# Patient Record
Sex: MALE | Race: WHITE | NOT HISPANIC OR LATINO | Employment: FULL TIME | URBAN - METROPOLITAN AREA
[De-identification: names, ages, dates, MRNs, and addresses within clinical notes are randomized per-mention and may not be internally consistent; named-entity substitution may affect disease eponyms.]

---

## 2017-01-27 ENCOUNTER — VIRTUAL VISIT (OUTPATIENT)
Dept: FAMILY MEDICINE | Facility: CLINIC | Age: 47
End: 2017-01-27
Payer: COMMERCIAL

## 2017-01-27 DIAGNOSIS — F41.1 GAD (GENERALIZED ANXIETY DISORDER): Primary | ICD-10-CM

## 2017-01-27 PROCEDURE — 99441 ZZC PHYSICIAN TELEPHONE EVALUATION 5-10 MIN: CPT | Performed by: FAMILY MEDICINE

## 2017-01-27 RX ORDER — ESCITALOPRAM OXALATE 10 MG/1
10 TABLET ORAL DAILY
Qty: 90 TABLET | Refills: 1 | Status: SHIPPED | OUTPATIENT
Start: 2017-01-27 | End: 2024-08-02

## 2017-01-27 NOTE — PROGRESS NOTES
Over the last two weeks, how often have you been bothered by any the following symptoms?  Please use the following scale;  0 = Not at all  1 = Several days but less than half  2 = More than half of the days  3 = Nearly every day    1) Little interest or pleasure in doing things [ 0]  2) Feeling down, depressed, or hopeless.[   1 ]  3) Trouble falling or staying asleep, or sleeping too much [   0 ]  4) Feeling tired or having little energy.[   0]  5) Poor appetite or overeating [   2 ]  6) Feeling bad about yourself - or that you are a failure or have let yourself or your family down [   1 ]  7) Trouble concentrating on things, such as reading the newspaper or watching television [   0]  8) Moving or speaking so slowly that other people could have noticed. Or the opposite-being fidgety or restless that you have been moving around a lot more than usual [  0  ]  9) Thoughts that you would be better off dead, or of hurting yourself in some way [  0 ]    Finally, how difficult have these problems made it for you to do your work, take care of things at home, or get along with other people?  Somewhat difficult

## 2017-01-27 NOTE — Clinical Note
[address] [address]  Phone: [phone]    January 27, 2017        Anatoliy Boykin  [address] [place] [id number]          To whom it may concern:    RE: Anatoliy Boykin    Patient is seen and treated at our clinic. As a result of his medical diagnosis he would benefit from a level of privacy in his work environment. Doing so will make him function more efficiently in a work environment. Factors such as having personal workspace without visual or auditory interruption by others when he needs to do independent work will be helpful.        Sincerely,        Kareem Coleman MD

## 2017-01-27 NOTE — PROGRESS NOTES
"Anatoliy Boykin is a 46 year old male who is being evaluated via a telephone visit.      The patient has been notified of following:     \"This telephone visit will be conducted via a call between you and your physician/provider. We have found that certain health care needs can be provided without the need for a physical exam.  This service lets us provide the care you need with a short phone conversation.  If a prescription is necessary we can send it directly to your pharmacy.  If lab work is needed we can place an order for that and you can then stop by our lab to have the test done at a later time.    We will bill your insurance company for this service.  Please check with your medical insurance if this type of visit is covered. You may be responsible for the cost of this type of visit if insurance coverage is denied.  The typical cost is $30 (10min), $59 (11-20min) and $85 (21-30min).  Most often these visits are shorter than 10 minutes.    If during the course of the call the physician/provider feels a telephone visit is not appropriate, you will not be charged for this service.\"       Consent has been obtained for this service by 2 care team members: yes. See the scanned image in the medical record.    Anatoliy Boykin complains of  No chief complaint on file.      I have reviewed and updated the patient's Past Medical History, Social History, Family History and Medication List.    ALLERGIES  Penicillins    VIVIANE Rendon   (MA signature)    Additional provider notes: He does find this new symptom of decreased appetite. He is not skipping meals, but difficult for him to eat breakfast. No headaches or shakiness between meals. Still not interested in therapy at this time.    He does find that he is able to function better in social situations. More manageable and symptoms are less severe.    ASSESSMENT    ICD-10-CM    1. LOWELL (generalized anxiety disorder) F41.1       Plan:  Letter provided and given to MA for " work environment.    I have reviewed the note as documented above.  This accurately captures the substance of my conversation with the patient,  Anatoliy Boykin     Plan to follow-up in 6 months.    Hep A/B #3 on or after April 17, 2017.    Total time of call between patient and provider was 10 minutes

## 2017-01-28 ASSESSMENT — PATIENT HEALTH QUESTIONNAIRE - PHQ9: SUM OF ALL RESPONSES TO PHQ QUESTIONS 1-9: 4

## 2017-04-18 ENCOUNTER — ALLIED HEALTH/NURSE VISIT (OUTPATIENT)
Dept: NURSING | Facility: CLINIC | Age: 47
End: 2017-04-18
Payer: COMMERCIAL

## 2017-04-18 DIAGNOSIS — Z23 NEED FOR HEPATITIS A AND B VACCINATION: Primary | ICD-10-CM

## 2017-04-18 PROCEDURE — 90636 HEP A/HEP B VACC ADULT IM: CPT

## 2017-04-18 PROCEDURE — 99207 ZZC NO CHARGE NURSE ONLY: CPT

## 2017-04-18 PROCEDURE — 90471 IMMUNIZATION ADMIN: CPT

## 2017-04-18 NOTE — MR AVS SNAPSHOT
After Visit Summary   4/18/2017    Anatoliy Boykin    MRN: [id number]           Patient Information     Date Of Birth          1970        Visit Information        Provider Department      4/18/2017 3:00 PM [facility]IST[facility]        Today's Diagnoses     Need for hepatitis A and B vaccination    -  1       Follow-ups after your visit        Who to contact     If you have questions or need follow up information about today's clinic visit or your schedule please contact [facility] directly at [phone].  Normal or non-critical lab and imaging results will be communicated to you by BRAINREPUBLIChart, letter or phone within 4 business days after the clinic has received the results. If you do not hear from us within 7 days, please contact the clinic through Bplatst or phone. If you have a critical or abnormal lab result, we will notify you by phone as soon as possible.  Submit refill requests through UpTo or call your pharmacy and they will forward the refill request to us. Please allow 3 business days for your refill to be completed.          Additional Information About Your Visit        MyChart Information     UpTo gives you secure access to your electronic health record. If you see a primary care provider, you can also send messages to your care team and make appointments. If you have questions, please call your primary care clinic.  If you do not have a primary care provider, please call [phone] and they will assist you.        Care EveryWhere ID     This is your Care EveryWhere ID. This could be used by other organizations to access your [place] medical records  [id number]         Blood Pressure from Last 3 Encounters:   11/17/16 118/78   10/06/16 120/76   12/06/12 110/70    Weight from Last 3 Encounters:   11/17/16 200 lb 4 oz (90.8 kg)   10/06/16 196 lb (88.9 kg)   12/06/12 212 lb (96.2 kg)              We Performed the Following      ADMIN 1st VACCINE     HEPA/HEPB VACCINE ADULT IM        Primary Care Provider Office Phone # Fax #    Kareem De Jesus Tim Coleman [phone]       [place] [address]  [facility] [address]        Thank you!     Thank you for choosing [facility]  for your care. Our goal is always to provide you with excellent care. Hearing back from our patients is one way we can continue to improve our services. Please take a few minutes to complete the written survey that you may receive in the mail after your visit with us. Thank you!             Your Updated Medication List - Protect others around you: Learn how to safely use, store and throw away your medicines at www.disposemymeds.org.          This list is accurate as of: 4/18/17  3:45 PM.  Always use your most recent med list.                   Brand Name Dispense Instructions for use    escitalopram 10 MG tablet    LEXAPRO    90 tablet    Take 1 tablet (10 mg) by mouth daily

## 2017-04-18 NOTE — PROGRESS NOTES
Screening Questionnaire for Adult Immunization    Are you sick today?   No   Do you have allergies to medications, food, a vaccine component or latex?   Yes   Have you ever had a serious reaction after receiving a vaccination?   No   Do you have a long-term health problem with heart disease, lung disease, asthma, kidney disease, metabolic disease (e.g. diabetes), anemia, or other blood disorder?   No   Do you have cancer, leukemia, HIV/AIDS, or any other immune system problem?   No   In the past 3 months, have you taken medications that affect  your immune system, such as prednisone, other steroids, or anticancer drugs; drugs for the treatment of rheumatoid arthritis, Crohn s disease, or psoriasis; or have you had radiation treatments?   No   Have you had a seizure, or a brain or other nervous system problem?   No   During the past year, have you received a transfusion of blood or blood     products, or been given immune (gamma) globulin or antiviral drug?   No   For women: Are you pregnant or is there a chance you could become        pregnant during the next month?   No   Have you received any vaccinations in the past 4 weeks?   No     Immunization questionnaire was positive for at least one answer.  Notified Alexandrea.      MNVFC doesn't apply on this patient    Per orders of Dr. Lechuga, injection of Twinrix given by Nelsy Barron. Patient instructed to remain in clinic for 20 minutes afterwards, and to report any adverse reaction to me immediately.  VIS given     Screening performed by Nelsy Barron on 4/18/2017 at 3:34 PM.

## 2017-08-28 ENCOUNTER — OFFICE VISIT (OUTPATIENT)
Dept: FAMILY MEDICINE | Facility: CLINIC | Age: 47
End: 2017-08-28
Payer: COMMERCIAL

## 2017-08-28 VITALS
OXYGEN SATURATION: 97 % | RESPIRATION RATE: 16 BRPM | SYSTOLIC BLOOD PRESSURE: 117 MMHG | DIASTOLIC BLOOD PRESSURE: 77 MMHG | TEMPERATURE: 97.6 F | WEIGHT: 197 LBS | HEART RATE: 68 BPM | BODY MASS INDEX: 26.72 KG/M2

## 2017-08-28 DIAGNOSIS — D22.39 MELANOCYTIC NEVUS OF FACE, OTHER LOCATION: ICD-10-CM

## 2017-08-28 DIAGNOSIS — F41.1 GENERALIZED ANXIETY DISORDER: Primary | ICD-10-CM

## 2017-08-28 PROCEDURE — 99214 OFFICE O/P EST MOD 30 MIN: CPT | Performed by: FAMILY MEDICINE

## 2017-08-28 RX ORDER — ESCITALOPRAM OXALATE 20 MG/1
20 TABLET ORAL DAILY
Qty: 30 TABLET | Refills: 1 | Status: SHIPPED | OUTPATIENT
Start: 2017-08-28 | End: 2017-10-30

## 2017-08-28 ASSESSMENT — PATIENT HEALTH QUESTIONNAIRE - PHQ9: 5. POOR APPETITE OR OVEREATING: SEVERAL DAYS

## 2017-08-28 ASSESSMENT — ANXIETY QUESTIONNAIRES
7. FEELING AFRAID AS IF SOMETHING AWFUL MIGHT HAPPEN: NOT AT ALL
1. FEELING NERVOUS, ANXIOUS, OR ON EDGE: SEVERAL DAYS
2. NOT BEING ABLE TO STOP OR CONTROL WORRYING: NOT AT ALL
5. BEING SO RESTLESS THAT IT IS HARD TO SIT STILL: NOT AT ALL
6. BECOMING EASILY ANNOYED OR IRRITABLE: NOT AT ALL
GAD7 TOTAL SCORE: 3
IF YOU CHECKED OFF ANY PROBLEMS ON THIS QUESTIONNAIRE, HOW DIFFICULT HAVE THESE PROBLEMS MADE IT FOR YOU TO DO YOUR WORK, TAKE CARE OF THINGS AT HOME, OR GET ALONG WITH OTHER PEOPLE: NOT DIFFICULT AT ALL
3. WORRYING TOO MUCH ABOUT DIFFERENT THINGS: SEVERAL DAYS

## 2017-08-28 NOTE — MR AVS SNAPSHOT
After Visit Summary   8/28/2017    Anatoliy Boykin    MRN: [id number]           Patient Information     Date Of Birth          1970        Visit Information        Provider Department      8/28/2017 2:00 PM Kareem Lakhani MD [facility]        Today's Diagnoses     Generalized anxiety disorder    -  1    Melanocytic nevus of face, other location          Care Instructions    Plan is to increase lexapro to 20mg daily  Follow-up with me in about 1 month  Consider scheduling with the therapist    Consider consult with dermatology regarding question of enlarging mole on your nose.          Follow-ups after your visit        Additional Services     DERMATOLOGY REFERRAL       Your provider has referred you to: [facility]: Dermatology Clinic [facility] [phone]   [email or web address]    Please be aware that coverage of these services is subject to the terms and limitations of your health insurance plan.  Call member services at your health plan with any benefit or coverage questions.      Please bring the following with you to your appointment:    (1) Any X-Rays, CTs or MRIs which have been performed.  Contact the facility where they were done to arrange for  prior to your scheduled appointment.    (2) List of current medications  (3) This referral request   (4) Any documents/labs given to you for this referral            MENTAL HEALTH REFERRAL       Your provider has referred you to: [facility] Intake Scheduling [phone]  [email or web address]    All scheduling is subject to the client's specific insurance plan & benefits, provider/location availability, and provider clinical specialities.  Please arrive 15 minutes early for your first appointment and bring your completed paperwork.    Please be aware that coverage of these services is subject to the terms and limitations of your health  insurance plan.  Call member services at your health plan with any benefit or coverage questions.                  Who to contact     If you have questions or need follow up information about today's clinic visit or your schedule please contact [facility] directly at [phone].  Normal or non-critical lab and imaging results will be communicated to you by MyChart, letter or phone within 4 business days after the clinic has received the results. If you do not hear from us within 7 days, please contact the clinic through Area 1 Securityhart or phone. If you have a critical or abnormal lab result, we will notify you by phone as soon as possible.  Submit refill requests through LiveNinja or call your pharmacy and they will forward the refill request to us. Please allow 3 business days for your refill to be completed.          Additional Information About Your Visit        Area 1 Securityhart Information     LiveNinja gives you secure access to your electronic health record. If you see a primary care provider, you can also send messages to your care team and make appointments. If you have questions, please call your primary care clinic.  If you do not have a primary care provider, please call [phone] and they will assist you.        Care EveryWhere ID     This is your Care EveryWhere ID. This could be used by other organizations to access your [place] medical records  [id number]        Your Vitals Were     Pulse Temperature Respirations Pulse Oximetry BMI (Body Mass Index)       68 97.6  F (36.4  C) (Oral) 16 97% 26.72 kg/m2        Blood Pressure from Last 3 Encounters:   08/28/17 117/77   11/17/16 118/78   10/06/16 120/76    Weight from Last 3 Encounters:   08/28/17 197 lb (89.4 kg)   11/17/16 200 lb 4 oz (90.8 kg)   10/06/16 196 lb (88.9 kg)              We Performed the Following     DERMATOLOGY REFERRAL     MENTAL HEALTH REFERRAL          Today's Medication Changes          These changes are accurate as of:  8/28/17  3:14 PM.  If you have any questions, ask your nurse or doctor.               These medicines have changed or have updated prescriptions.        Dose/Directions    * escitalopram 10 MG tablet   Commonly known as:  LEXAPRO   This may have changed:  Another medication with the same name was added. Make sure you understand how and when to take each.   Used for:  LOWELL (generalized anxiety disorder)   Changed by:  Kareem Lakhani MD        Dose:  10 mg   Take 1 tablet (10 mg) by mouth daily   Quantity:  90 tablet   Refills:  1       * escitalopram 20 MG tablet   Commonly known as:  LEXAPRO   This may have changed:  You were already taking a medication with the same name, and this prescription was added. Make sure you understand how and when to take each.   Used for:  Generalized anxiety disorder   Changed by:  Kareem Lakhani MD        Dose:  20 mg   Take 1 tablet (20 mg) by mouth daily   Quantity:  30 tablet   Refills:  1       * Notice:  This list has 2 medication(s) that are the same as other medications prescribed for you. Read the directions carefully, and ask your doctor or other care provider to review them with you.         Where to get your medicines      These medications were sent to [facility] - [place], [place] - [address]  [address], [place] [address]     Phone:  [phone]     escitalopram 20 MG tablet                Primary Care Provider Office Phone # Fax #    Kareem Coleman [phone]       [address]  [facility] [address]        Equal Access to Services     [facility]: Hadii alayna ku hadasho Soomaali, waaxda luqadaha, qaybta kaalmada adeegyada, meng louise . So [facility] [phone].    ATENCIÓN: Si habla español, tiene a ibanez disposición servicios gratuitos de asistencia lingüística. Llame al [phone].    We comply with applicable federal civil rights laws and [place]  laws. We do not discriminate on the basis of race, color, national origin, age, disability sex, sexual orientation or gender identity.            Thank you!     Thank you for choosing [facility]  for your care. Our goal is always to provide you with excellent care. Hearing back from our patients is one way we can continue to improve our services. Please take a few minutes to complete the written survey that you may receive in the mail after your visit with us. Thank you!             Your Updated Medication List - Protect others around you: Learn how to safely use, store and throw away your medicines at www.disposemymeds.org.          This list is accurate as of: 8/28/17  3:14 PM.  Always use your most recent med list.                   Brand Name Dispense Instructions for use Diagnosis    * escitalopram 10 MG tablet    LEXAPRO    90 tablet    Take 1 tablet (10 mg) by mouth daily    LOWELL (generalized anxiety disorder)       * escitalopram 20 MG tablet    LEXAPRO    30 tablet    Take 1 tablet (20 mg) by mouth daily    Generalized anxiety disorder       * Notice:  This list has 2 medication(s) that are the same as other medications prescribed for you. Read the directions carefully, and ask your doctor or other care provider to review them with you.

## 2017-08-28 NOTE — PATIENT INSTRUCTIONS
Plan is to increase lexapro to 20mg daily  Follow-up with me in about 1 month  Consider scheduling with the therapist    Consider consult with dermatology regarding question of enlarging mole on your nose.

## 2017-08-28 NOTE — PROGRESS NOTES
SUBJECTIVE:   Anatoliy Boykin is a 47 year old male who presents to clinic today for the following health issues:      Anxiety Follow-Up    Status since last visit: Improved slightly     Other associated symptoms: Decrease appetite     Complicating factors:   Significant life event: No   Current substance abuse: None  Depression symptoms: Yes-  But seems that it has been improved since last appointment.  LOWELL-7 SCORE 11/17/2016   Total Score 9       GAD7          Amount of exercise or physical activity: 4-5 days/week for an average of 30-45 minutes    Problems taking medications regularly: No    Medication side effects: loss of appetite     Diet: regular (no restrictions)    Last 8 months has been pretty good.    Situations with anxiety used to be intolerable and have become close to tolerable. They haven't been pleasant, but he hasn't had to leave meetings or restaurants. He feels this is a big improvement. Feels he is on an upward trajectory. It is not a dramatic change but a significant one.    His office relocated a couple of months ago. The physical environment is much more to his taste which helps his office related anxiety.    Has had therapy in past, has done this in the past but it has been years.    Wonders about getting mole on face removed or biopsied as it is getting bigger.    EXAM:  /77 (BP Location: Right arm, Patient Position: Chair, Cuff Size: Adult Regular)  Pulse 68  Temp 97.6  F (36.4  C) (Oral)  Resp 16  Wt 197 lb (89.4 kg)  SpO2 97%  BMI 26.72 kg/m2  Constitutional: Healthy, alert, no distress   Skin: 3mm mole at left ankle of nose, no pigmentation no ulceration.  Cardiovascular: RRR. No murmurs   Respiratory: Clear to auscultation   Psych: mood good, affect appropriate, insight and judgment good, function good.    ASSESSMENT    ICD-10-CM    1. Generalized anxiety disorder F41.1 escitalopram (LEXAPRO) 20 MG tablet     MENTAL HEALTH REFERRAL   2. Melanocytic nevus of face,  other location D22.39 DERMATOLOGY REFERRAL      Plan:  Patient Instructions   Plan is to increase lexapro to 20mg daily  Follow-up with me in about 1 month  Consider scheduling with the therapist    Consider consult with dermatology regarding question of enlarging mole on your nose.     Kareem Coleman MD  Family Medicine Physician

## 2017-08-28 NOTE — NURSING NOTE
Chief Complaint   Patient presents with     Recheck Medication     escatalopram        Initial /77 (BP Location: Right arm, Patient Position: Chair, Cuff Size: Adult Regular)  Pulse 68  Temp 97.6  F (36.4  C) (Oral)  Resp 16  Wt 197 lb (89.4 kg)  SpO2 97%  BMI 26.72 kg/m2 Estimated body mass index is 26.72 kg/(m^2) as calculated from the following:    Height as of 11/17/16: 6' (1.829 m).    Weight as of this encounter: 197 lb (89.4 kg).  Medication Reconciliation: complete     Migue Kessler MA

## 2017-08-29 ASSESSMENT — ANXIETY QUESTIONNAIRES: GAD7 TOTAL SCORE: 3

## 2017-10-30 ENCOUNTER — OFFICE VISIT (OUTPATIENT)
Dept: FAMILY MEDICINE | Facility: CLINIC | Age: 47
End: 2017-10-30
Payer: COMMERCIAL

## 2017-10-30 VITALS
OXYGEN SATURATION: 97 % | WEIGHT: 191 LBS | BODY MASS INDEX: 25.87 KG/M2 | SYSTOLIC BLOOD PRESSURE: 119 MMHG | DIASTOLIC BLOOD PRESSURE: 76 MMHG | HEART RATE: 63 BPM | RESPIRATION RATE: 18 BRPM | TEMPERATURE: 97.4 F | HEIGHT: 72 IN

## 2017-10-30 DIAGNOSIS — Z23 NEED FOR PROPHYLACTIC VACCINATION AND INOCULATION AGAINST INFLUENZA: ICD-10-CM

## 2017-10-30 DIAGNOSIS — Z12.11 SCREENING FOR COLON CANCER: ICD-10-CM

## 2017-10-30 DIAGNOSIS — F41.1 GENERALIZED ANXIETY DISORDER: Primary | ICD-10-CM

## 2017-10-30 PROCEDURE — 90471 IMMUNIZATION ADMIN: CPT | Performed by: FAMILY MEDICINE

## 2017-10-30 PROCEDURE — 99213 OFFICE O/P EST LOW 20 MIN: CPT | Mod: 25 | Performed by: FAMILY MEDICINE

## 2017-10-30 PROCEDURE — 90686 IIV4 VACC NO PRSV 0.5 ML IM: CPT | Performed by: FAMILY MEDICINE

## 2017-10-30 RX ORDER — ESCITALOPRAM OXALATE 20 MG/1
20 TABLET ORAL DAILY
Qty: 90 TABLET | Refills: 3 | Status: SHIPPED | OUTPATIENT
Start: 2017-10-30 | End: 2024-08-02

## 2017-10-30 ASSESSMENT — PATIENT HEALTH QUESTIONNAIRE - PHQ9
5. POOR APPETITE OR OVEREATING: SEVERAL DAYS
SUM OF ALL RESPONSES TO PHQ QUESTIONS 1-9: 7

## 2017-10-30 ASSESSMENT — ANXIETY QUESTIONNAIRES
6. BECOMING EASILY ANNOYED OR IRRITABLE: NOT AT ALL
5. BEING SO RESTLESS THAT IT IS HARD TO SIT STILL: NOT AT ALL
1. FEELING NERVOUS, ANXIOUS, OR ON EDGE: SEVERAL DAYS
GAD7 TOTAL SCORE: 3
7. FEELING AFRAID AS IF SOMETHING AWFUL MIGHT HAPPEN: NOT AT ALL
3. WORRYING TOO MUCH ABOUT DIFFERENT THINGS: SEVERAL DAYS
2. NOT BEING ABLE TO STOP OR CONTROL WORRYING: NOT AT ALL

## 2017-10-30 NOTE — MR AVS SNAPSHOT
After Visit Summary   10/30/2017    Anatoliy Boykin    MRN: [id number]           Patient Information     Date Of Birth          1970        Visit Information        Provider Department      10/30/2017 3:00 PM Kareem Lakhani MD [facility]        Today's Diagnoses     Screening for colon cancer    -  1    Generalized anxiety disorder          Care Instructions    I recommend changing timing of lexapro to breakfast/AM which should help your sleep pattern.    Because the mole on your nose appears to be changing and has grown compared to the last note - I recommend an evaluation with dermatology.    Follow-up in 3-6 months to review response to therapy along with medication and consider if we should adjust medication at that point aiming for even better response. Sooner if sleep is still an issue after changing the timing of medication.          Follow-ups after your visit        Additional Services     GASTROENTEROLOGY ADULT REF PROCEDURE ONLY       Last Lab Result: No results found for: CR  Body mass index is 25.9 kg/(m^2).     Needed:  No  Language:  English    Patient will be contacted to schedule procedure.     Please be aware that coverage of these services is subject to the terms and limitations of your health insurance plan.  Call member services at your health plan with any benefit or coverage questions.  Any procedures must be performed at a [facility] facility OR coordinated by your clinic's referral office.    Please bring the following with you to your appointment:    (1) Any X-Rays, CTs or MRIs which have been performed.  Contact the facility where they were done to arrange for  prior to your scheduled appointment.    (2) List of current medications   (3) This referral request   (4) Any documents/labs given to you for this referral                  Your next 10 appointments already scheduled     Nov 08, 2017 12:00 PM CST   New Visit  with Vanda Westfall [facility]    [facility]  [address]  [facility] [address] [phone]              Who to contact     If you have questions or need follow up information about today's clinic visit or your schedule please contact [facility] directly at [phone].  Normal or non-critical lab and imaging results will be communicated to you by [employer], letter or phone within 4 business days after the clinic has received the results. If you do not hear from us within 7 days, please contact the clinic through [employer] or phone. If you have a critical or abnormal lab result, we will notify you by phone as soon as possible.  Submit refill requests through [employer] or call your pharmacy and they will forward the refill request to us. Please allow 3 business days for your refill to be completed.          Additional Information About Your Visit        Backblaze[employer] Information     [employer] gives you secure access to your electronic health record. If you see a primary care provider, you can also send messages to your care team and make appointments. If you have questions, please call your primary care clinic.  If you do not have a primary care provider, please call [phone] and they will assist you.        Care EveryWhere ID     This is your Care EveryWhere ID. This could be used by other organizations to access your [place] medical records  [id number]        Your Vitals Were     Pulse Temperature Respirations Height Pulse Oximetry BMI (Body Mass Index)    63 97.4  F (36.3  C) (Oral) 18 6' (1.829 m) 97% 25.9 kg/m2       Blood Pressure from Last 3 Encounters:   10/30/17 119/76   08/28/17 117/77   11/17/16 118/78    Weight from Last 3 Encounters:   10/30/17 191 lb (86.6 kg)   08/28/17 197 lb (89.4 kg)   11/17/16 200 lb 4 oz (90.8 kg)              We Performed the Following     GASTROENTEROLOGY ADULT REF PROCEDURE ONLY           Where to get your medicines      These medications were sent to [facility] - [place], [place] - [address]  [address], [place] [address]     Phone:  [phone]     escitalopram 20 MG tablet          Primary Care Provider Office Phone # Fax #    Kareem De Jesus Tim Coleman [phone]       [address]  [facility] [address]        Equal Access to Services     JARRET ROMAN : Hadii aad ku hadasho Soomaali, waaxda luqadaha, qaybta kaalmada adeegyada, waxay idiin hayaan adeeg kharash la'rosas . So [facility] [phone].    ATENCIÓN: Si habla español, tiene a ibanez disposición servicios gratuitos de asistencia lingüística. [facility] [phone].    We comply with applicable federal civil rights laws and [place] laws. We do not discriminate on the basis of race, color, national origin, age, disability, sex, sexual orientation, or gender identity.            Thank you!     Thank you for choosing [facility]  for your care. Our goal is always to provide you with excellent care. Hearing back from our patients is one way we can continue to improve our services. Please take a few minutes to complete the written survey that you may receive in the mail after your visit with us. Thank you!             Your Updated Medication List - Protect others around you: Learn how to safely use, store and throw away your medicines at www.disposemymeds.org.          This list is accurate as of: 10/30/17  3:31 PM.  Always use your most recent med list.                   Brand Name Dispense Instructions for use Diagnosis    * escitalopram 10 MG tablet    LEXAPRO    90 tablet    Take 1 tablet (10 mg) by mouth daily    LOWELL (generalized anxiety disorder)       * escitalopram 20 MG tablet    LEXAPRO    90 tablet    Take 1 tablet (20 mg) by mouth daily    Generalized anxiety disorder       * Notice:  This list has 2 medication(s) that are the same as other medications prescribed for you.  Read the directions carefully, and ask your doctor or other care provider to review them with you.

## 2017-10-30 NOTE — PATIENT INSTRUCTIONS
I recommend changing timing of lexapro to breakfast/AM which should help your sleep pattern.    Because the mole on your nose appears to be changing and has grown compared to the last note - I recommend an evaluation with dermatology.    Follow-up in 3-6 months to review response to therapy along with medication and consider if we should adjust medication at that point aiming for even better response. Sooner if sleep is still an issue after changing the timing of medication.

## 2017-10-30 NOTE — PROGRESS NOTES

## 2017-10-30 NOTE — PROGRESS NOTES
SUBJECTIVE:   Anatoliy Boykin is a 47 year old male who presents to clinic today for the following health issues:    Anxiety Follow-Up    Status since last visit: manageable     Other associated symptoms:throuble sleeping     Complicating factors:   Significant life event: No   Current substance abuse: None  Depression symptoms: Yes- low energy, moodiness, lack of desire to engagement    LOWELL-7 SCORE 11/17/2016 8/28/2017 10/30/2017   Total Score 9 3 3       GAD7    Amount of exercise or physical activity: 4 days/week for an average of 30-45 minutes    Problems taking medications regularly: No    Medication side effects: trouble sleeping     Diet: regular (no restrictions)      Has been taking lexapro at 20mg nightly. Has noted poor sleep since doing so.    He scheduled a therapist visit for next week.     Still notes trigger is closed in public gathering. He still notices a reaction, but his physiological response feels tamped down. It is still there, but more manageable.    EXAM:  /76 (BP Location: Right arm, Patient Position: Sitting, Cuff Size: Adult Regular)  Pulse 63  Temp 97.4  F (36.3  C) (Oral)  Resp 18  Ht 6' (1.829 m)  Wt 191 lb (86.6 kg)  SpO2 97%  BMI 25.9 kg/m2  Constitutional: Healthy, alert, no distress, a bit anxious  Cardiovascular: RRR. No murmurs   Respiratory: Clear to auscultation   Psych: mood fair, affect appropriate, a bit anxious, function fair.  Skin: 5mm nodular lesion on left ankle of nose.    ASSESSMENT    ICD-10-CM    1. Generalized anxiety disorder F41.1 escitalopram (LEXAPRO) 20 MG tablet   2. Screening for colon cancer Z12.11 GASTROENTEROLOGY ADULT REF PROCEDURE ONLY   3. Need for prophylactic vaccination and inoculation against influenza Z23 FLU VAC, SPLIT VIRUS IM > 3 YO (QUADRIVALENT) [50640]     Vaccine Administration, Initial [77424]      Plan:  Patient Instructions   I recommend changing timing of lexapro to breakfast/AM which should help your sleep  pattern.    Because the mole on your nose appears to be changing and has grown compared to the last note - I recommend an evaluation with dermatology.    Follow-up in 3-6 months to review response to therapy along with medication and consider if we should adjust medication at that point aiming for even better response. Sooner if sleep is still an issue after changing the timing of medication.     Kareem Coleman MD  Family Medicine Physician

## 2017-10-31 ENCOUNTER — MYC MEDICAL ADVICE (OUTPATIENT)
Dept: FAMILY MEDICINE | Facility: CLINIC | Age: 47
End: 2017-10-31

## 2017-10-31 ASSESSMENT — ANXIETY QUESTIONNAIRES: GAD7 TOTAL SCORE: 3

## 2018-02-21 ENCOUNTER — TELEPHONE (OUTPATIENT)
Dept: DERMATOLOGY | Facility: CLINIC | Age: 48
End: 2018-02-21

## 2018-02-21 NOTE — TELEPHONE ENCOUNTER
Called [facility] to remind him of his appointment on 3/1/18. Informed them of parking and to arrive 15 minutes early.   Sadaf Ford MA

## 2018-03-01 ENCOUNTER — OFFICE VISIT (OUTPATIENT)
Dept: DERMATOLOGY | Facility: CLINIC | Age: 48
End: 2018-03-01
Payer: COMMERCIAL

## 2018-03-01 DIAGNOSIS — D23.9 DERMAL NEVUS: Primary | ICD-10-CM

## 2018-03-01 DIAGNOSIS — D18.01 CHERRY HEMANGIOMA: ICD-10-CM

## 2018-03-01 ASSESSMENT — PAIN SCALES - GENERAL: PAINLEVEL: NO PAIN (0)

## 2018-03-01 NOTE — NURSING NOTE
Dermatology Rooming Note    Anatoliy Boykin's goals for this visit include:   Chief Complaint   Patient presents with     Derm Problem     skin check and mole removal on left side of the nose     Ludivina Christine LPN

## 2018-03-01 NOTE — LETTER
3/1/2018     RE: Anatoliy Boykin  [address] [facility] [address]     Dear Colleague,    Thank you for referring your patient, Anatoliy Boykin, to the [facility] DERMATOLOGY at [facility]. Please see a copy of my visit note below.    [facility] Dermatology Note    Encounter Date: Mar 1, 2018    CC:  Chief Complaint   Patient presents with     Derm Problem     skin check and mole removal on left side of the nose         History of Present Illness:  Mr. Anatoliy Boykin is a 47 year old male as a follow-up for mole removal and skin check as a new patient. The patient  Is a referral from Dr. Tim Coleman on 8/28/2017. The patient reports that there is a mole on the let side of the nose that has a history of bleeding, pruritus and catching when shaving. He is interested in having this nevus removed.     Past Medical History:   Patient Active Problem List   Diagnosis     Generalized anxiety disorder     CARDIOVASCULAR SCREENING; LDL GOAL LESS THAN 160     Performance anxiety     Past Medical History:   Diagnosis Date     social anxiety disorder 1998     History reviewed. No pertinent surgical history.    Family History:  No family history of skin cancers     Medications:  Current Outpatient Prescriptions   Medication Sig Dispense Refill     escitalopram (LEXAPRO) 20 MG tablet Take 1 tablet (20 mg) by mouth daily 90 tablet 3     escitalopram (LEXAPRO) 10 MG tablet Take 1 tablet (10 mg) by mouth daily (Patient not taking: Reported on 10/30/2017) 90 tablet 1       Allergies   Allergen Reactions     Penicillins      As child, pt does not know reaction       Review of Systems:  -Constitutional: The patient is feeling well.   -Skin: As above in HPI. No additional skin concerns.    Physical exam:  GEN: This is a well developed, well-nourished male in no acute distress, in a pleasant mood.    SKIN: Total skin excluding the undergarment areas was  performed. The exam included the head/face, neck, both arms, chest, back, abdomen, both legs, digits and/or nails. Declines genital exam.   -Scattered regular brown pigmented macules and papules are identified on the trunk and extremities  -4 mm homogenous symmetric rubbery papule on the left alar groove.   -There are dome shaped bright red papules on the trunk and extremtities.   -No other lesions of concern on areas examined.       Impression/Plan:  1. Cherry angiomas and multiple benign nevi     No further intervention required. Patient to report changes.     2. Nevi on the left alar groove with history of pruritus, hx of catching on razor and history of bleeding.     Referral to dermsurgery for nevus removal on the face     CC Dr. Tim Coleman on close of this encounter.  Follow-up as scheduled with derm surgery for nevi removal.       Staff Involved:  Scribe/Staff    Scribe Disclosure:   I, Siri Edmondson, am serving as a scribe to document services personally performed by Dr. Karen Alfonso, based on data collection and the provider's statements to me.     Provider Disclosure:   The documentation recorded by the scribe accurately reflects the services I personally performed and the decisions made by me.    Garret Conklin MD, FAAD    Departments of Internal Medicine and Dermatology  [facility]  [phone]

## 2018-03-01 NOTE — PROGRESS NOTES
[facility] Dermatology Note    Encounter Date: Mar 1, 2018    CC:  Chief Complaint   Patient presents with     Derm Problem     skin check and mole removal on left side of the nose         History of Present Illness:  Mr. Anatoliy Boykin is a 47 year old male as a follow-up for mole removal and skin check as a new patient. The patient  Is a referral from Dr. Tim Coleman on 8/28/2017. The patient reports that there is a mole on the let side of the nose that has a history of bleeding, pruritus and catching when shaving. He is interested in having this nevus removed.     Past Medical History:   Patient Active Problem List   Diagnosis     Generalized anxiety disorder     CARDIOVASCULAR SCREENING; LDL GOAL LESS THAN 160     Performance anxiety     Past Medical History:   Diagnosis Date     social anxiety disorder 1998     History reviewed. No pertinent surgical history.    Family History:  No family history of skin cancers     Medications:  Current Outpatient Prescriptions   Medication Sig Dispense Refill     escitalopram (LEXAPRO) 20 MG tablet Take 1 tablet (20 mg) by mouth daily 90 tablet 3     escitalopram (LEXAPRO) 10 MG tablet Take 1 tablet (10 mg) by mouth daily (Patient not taking: Reported on 10/30/2017) 90 tablet 1       Allergies   Allergen Reactions     Penicillins      As child, pt does not know reaction       Review of Systems:  -Constitutional: The patient is feeling well.   -Skin: As above in HPI. No additional skin concerns.    Physical exam:  GEN: This is a well developed, well-nourished male in no acute distress, in a pleasant mood.    SKIN: Total skin excluding the undergarment areas was performed. The exam included the head/face, neck, both arms, chest, back, abdomen, both legs, digits and/or nails. Declines genital exam.   -Scattered regular brown pigmented macules and papules are identified on the trunk and extremities  -4 mm homogenous symmetric rubbery papule on the left  alar groove.   -There are dome shaped bright red papules on the trunk and extremtities.   -No other lesions of concern on areas examined.       Impression/Plan:  1. Cherry angiomas and multiple benign nevi     No further intervention required. Patient to report changes.     2. Nevi on the left alar groove with history of pruritus, hx of catching on razor and history of bleeding.     Referral to dermsurgery for nevus removal on the face     CC Dr. Tim Coleman on close of this encounter.  Follow-up as scheduled with derm surgery for nevi removal.       Staff Involved:  Scribe/Staff    Scribe Disclosure:   I, Siri Edmondson, am serving as a scribe to document services personally performed by Dr. Karen Alfonso, based on data collection and the provider's statements to me.     Provider Disclosure:   The documentation recorded by the scribe accurately reflects the services I personally performed and the decisions made by me.    Garret Conklin MD, FAAD    Departments of Internal Medicine and Dermatology  [facility]  [phone]

## 2018-03-01 NOTE — MR AVS SNAPSHOT
After Visit Summary   3/1/2018    Anatoliy Boykin    MRN: [id number]           Patient Information     Date Of Birth          1970        Visit Information        Provider Department      3/1/2018 4:45 PM Garret Conklin MD [facility] Dermatology        Today's Diagnoses     Dermal nevus    -  1       Follow-ups after your visit        Additional Services     DERMATOLOGY SURGERY REFERRAL       Result Note/Instructions:                  Your next 10 appointments already scheduled     Mar 14, 2018  3:15 PM CDT   (Arrive by 3:00 PM)   Return Visit with Garrick Ya MD   [facility] Dermatologic Surgery ([facility])    [address] [place] [address] [phone]              Who to contact     Please call your clinic at [phone] to:    Ask questions about your health    Make or cancel appointments    Discuss your medicines    Learn about your test results    Speak to your doctor            Additional Information About Your Visit        MyChart Information     [employer] gives you secure access to your electronic health record. If you see a primary care provider, you can also send messages to your care team and make appointments. If you have questions, please call your primary care clinic.  If you do not have a primary care provider, please call [phone] and they will assist you.      [employer] is an electronic gateway that provides easy, online access to your medical records. With [employer], you can request a clinic appointment, read your test results, renew a prescription or communicate with your care team.     To access your existing account, please contact your [facility] or call [phone] for assistance.        Care EveryWhere ID     This is your Care EveryWhere ID. This could be used by other organizations to access your [place] medical records  [id number]         Blood Pressure from Last 3  Encounters:   10/30/17 119/76   08/28/17 117/77   11/17/16 118/78    Weight from Last 3 Encounters:   10/30/17 86.6 kg (191 lb)   08/28/17 89.4 kg (197 lb)   11/17/16 90.8 kg (200 lb 4 oz)              We Performed the Following     DERMATOLOGY SURGERY REFERRAL        Primary Care Provider Office Phone # Fax #    Kareem De Jesus Tim Coleman [phone]       [address]  [facility] [address]        Equal Access to Services     [facility]: Hadii aad ku hadasho Soomaali, waaxda luqadaha, qaybta kaalmada adeegyada, meng louise . So [facility] [phone].    ATENCIÓN: Si habla español, tiene a ibanez disposición servicios gratuitos de asistencia lingüística. [facility] [phone].    We comply with applicable federal civil rights laws and [place] laws. We do not discriminate on the basis of race, color, national origin, age, disability, sex, sexual orientation, or gender identity.            Thank you!     Thank you for choosing [facility] DERMATOLOGY  for your care. Our goal is always to provide you with excellent care. Hearing back from our patients is one way we can continue to improve our services. Please take a few minutes to complete the written survey that you may receive in the mail after your visit with us. Thank you!             Your Updated Medication List - Protect others around you: Learn how to safely use, store and throw away your medicines at www.disposemymeds.org.          This list is accurate as of 3/1/18  6:10 PM.  Always use your most recent med list.                   Brand Name Dispense Instructions for use Diagnosis    * escitalopram 10 MG tablet    LEXAPRO    90 tablet    Take 1 tablet (10 mg) by mouth daily    LOWELL (generalized anxiety disorder)       * escitalopram 20 MG tablet    LEXAPRO    90 tablet    Take 1 tablet (20 mg) by mouth daily    Generalized anxiety disorder       * Notice:  This list has 2 medication(s) that are the same as other  medications prescribed for you. Read the directions carefully, and ask your doctor or other care provider to review them with you.

## 2018-03-06 ENCOUNTER — TELEPHONE (OUTPATIENT)
Dept: DERMATOLOGY | Facility: CLINIC | Age: 48
End: 2018-03-06

## 2018-03-06 NOTE — TELEPHONE ENCOUNTER
I called the patient and I left a message asking that the patient call back. He needs to schedule a consult with Dr. Ya for a possible excision on the left alar groove.   Tish Rudd, EZEQUIEL

## 2018-03-07 NOTE — TELEPHONE ENCOUNTER
Anatoliy is returning Tish's call. Pt advised that he already has a consult scheduled for 3/14/2018. He will be able to talk to Dr. Ya about excision.     Zunilda Bravo

## 2018-03-14 ENCOUNTER — OFFICE VISIT (OUTPATIENT)
Dept: DERMATOLOGY | Facility: CLINIC | Age: 48
End: 2018-03-14
Payer: COMMERCIAL

## 2018-03-14 DIAGNOSIS — D23.9 INTRADERMAL NEVUS: Primary | ICD-10-CM

## 2018-03-14 ASSESSMENT — PAIN SCALES - GENERAL: PAINLEVEL: NO PAIN (0)

## 2018-03-14 NOTE — PATIENT INSTRUCTIONS
Laser Procedure:     Referral to Dr. Cerrato, [facility], [address] [place] [address], Phone: [phone]        Dermatological Surgery Unit  Garrick FeltonDO julien    **Schedule excision**     Pre-Surgical Instruction Sheet    1. Take all normal medications -- unless otherwise indicated by your physician.    2. If you have an artifical heart valve, or joint replacement within two years, take your antibiotic as prescribed. Otherwise, we will administer antibiotic in our office.    3. You will need a  to be with you if your surgical site is close to your eyes. You can get swelling/bruising immediately after surgery and will go home with a big bulky bandage that could obstruct your view of driving safely.    4. Wear comfortable clothing -- preferably a button down shirt or a loose fitted shirt. (to avoid pulling over pressure bandage off when you get home) If your surgery site is on your lower extremity, try wearing loose fitted pants. If your surgery site is on your upper extremity, try wearing a short-sleeve shirt.    5. Women - do NOT wear make-up. We will be washing it off anyone needing surgery on the face.    6. Do NOT stop blood thinning medication unless instructed to do so by your surgeon. This will include: Warfarin, Coumadin, Jantoven, Aspirin, Plavix and Pradaxa.    7. Tylenol is a good alternative to take for headaches and pain, and can be taken throughout your surgery and postoperative recovery.    8. If your surgery is on your trunk, arms, hands, legs, feet, fingernail or a toenail, please wash the area with Hibiclens, an over-the-counter antiseptic soap, the night before and morning of your surgery.     If you have any questions, please feel free to contact us.  Phone numbers:  During business hours (M-F 8:00-4:30 p.m.)  [facility]-  [phone] Option 1 appt. desk  [phone]  Option 3 nurse triage  line  ---------------------------------------------------------  Evenings/Weekends/Holidays  Hospital - [phone]   TTY for hearing whnpxnxj-[phone]  *Ask  to page dermatologist on-call  Emergency Velv-[phone]  TTY for hearing impaired- [phone]

## 2018-03-14 NOTE — LETTER
3/14/2018       RE: Anatoliy Boykin  [address] [facility] [address]     Dear Colleague,    Thank you for referring your patient, Anatoliy Boykin, to the [facility] DERMATOLOGIC SURGERY at [facility]. Please see a copy of my visit note below.    [facility] Dermatology Note      Dermatology Problem List:  1. New patient     Encounter Date: Mar 14, 2018    CC:  Chief Complaint   Patient presents with     Consult     Mr. Boykin is here today to talk about getting a mole removed from the left side of the nasal ala.          History of Present Illness:  Mr. Anatoliy Boykin is a 47 year old male who presents for a consultation regarding a bothersome nevus on the left side of his nose that he would like excised. The patient was referred from Dr. Conklin who last saw the patient on 3/1/18.     The patient says dermatologists in the past have declared the spot to be a normal nevus. However, he says there is a history of minor bleeding and pruritis over the past few years. He also has caught it while shaving on various occasions. He is interested in exploring the possibilities of having the nevus removed all-together.      The patient is otherwise feeling well. There are no other skin concerns at this time.       Past Medical History:   Patient Active Problem List   Diagnosis     Generalized anxiety disorder     CARDIOVASCULAR SCREENING; LDL GOAL LESS THAN 160     Performance anxiety     Past Medical History:   Diagnosis Date     social anxiety disorder 1998     History reviewed. No pertinent surgical history.    Social History:  The patient works in a library.     Family History:  There is no family history of skin cancer.    Medications:  Current Outpatient Prescriptions   Medication Sig Dispense Refill     escitalopram (LEXAPRO) 20 MG tablet Take 1 tablet (20 mg) by mouth daily 90 tablet 3     escitalopram (LEXAPRO) 10 MG tablet Take 1 tablet  (10 mg) by mouth daily (Patient not taking: Reported on 10/30/2017) 90 tablet 1       Allergies   Allergen Reactions     Penicillins      As child, pt does not know reaction       Review of Systems:  -Skin Establ Pt: The patient denies any new rash, pruritus, or lesions that are symptomatic, changing or bleeding, except as per HPI.  -Constitutional: The patient is feeling generally well.    Physical exam:  Vitals: There were no vitals taken for this visit.  GEN: This is a well developed, well-nourished male in no acute distress, in a pleasant mood.    SKIN: Focused examination of the lesion of concern was performed.  - Naso-facial sulcus, 0.4 x 0.4 cm pink dome shaped papule   - No other lesions of concern on areas examined.       Impression/Plan:  1. Nevus, L nasal ala     Patient has a bothersome nevus on the left nasaofacial sulcus that has a history of bleeding and catching while shaving.     Risks, benefits, and process of surgical excision were discussed including possibility of damage to surrounding anatomical structures and infection. Patient is comfortable proceeding with the surgery; consent form was obtained. He will be scheduled at his convenience.    Excision with linear repair within nasofacial sulcus to minimize appearance of a scar.     Additionally discussed possibility of laser treatment (Smooth Beam). Patient was given referral for Dr. Cerrato if he decides against excision.     Patient decided to think about his options and schedule accordingly.     Photo obtained at today's visit for inclusion in medical record.     Follow-up as scheduled for excision.     Staff Involved:    Scribe Disclosure  I, Walter Hidalgo, am serving as a scribe to document services personally performed by Dr. Garrick Ya DO, based on data collection and the provider's statements to me.     Provider Disclosure:   The documentation recorded by the scribe accurately reflects the services I personally performed and  the decisions made by me.    Garrick Ya DO    Department of Dermatology  [facility]: Phone: [phone], Fax:[phone]  [facility]: Phone: [phone], Fax: [phone]

## 2018-03-14 NOTE — PROGRESS NOTES
[facility] Dermatology Note      Dermatology Problem List:  1. New patient     Encounter Date: Mar 14, 2018    CC:  Chief Complaint   Patient presents with     Consult     Mr. Boykin is here today to talk about getting a mole removed from the left side of the nasal ala.          History of Present Illness:  Mr. Anatoliy Boykin is a 47 year old male who presents for a consultation regarding a bothersome nevus on the left side of his nose that he would like excised. The patient was referred from Dr. Conklin who last saw the patient on 3/1/18.     The patient says dermatologists in the past have declared the spot to be a normal nevus. However, he says there is a history of minor bleeding and pruritis over the past few years. He also has caught it while shaving on various occasions. He is interested in exploring the possibilities of having the nevus removed all-together.      The patient is otherwise feeling well. There are no other skin concerns at this time.       Past Medical History:   Patient Active Problem List   Diagnosis     Generalized anxiety disorder     CARDIOVASCULAR SCREENING; LDL GOAL LESS THAN 160     Performance anxiety     Past Medical History:   Diagnosis Date     social anxiety disorder 1998     History reviewed. No pertinent surgical history.    Social History:  The patient works in a library.     Family History:  There is no family history of skin cancer.    Medications:  Current Outpatient Prescriptions   Medication Sig Dispense Refill     escitalopram (LEXAPRO) 20 MG tablet Take 1 tablet (20 mg) by mouth daily 90 tablet 3     escitalopram (LEXAPRO) 10 MG tablet Take 1 tablet (10 mg) by mouth daily (Patient not taking: Reported on 10/30/2017) 90 tablet 1       Allergies   Allergen Reactions     Penicillins      As child, pt does not know reaction       Review of Systems:  -Skin Establ Pt: The patient denies any new rash, pruritus, or lesions that are symptomatic,  changing or bleeding, except as per HPI.  -Constitutional: The patient is feeling generally well.    Physical exam:  Vitals: There were no vitals taken for this visit.  GEN: This is a well developed, well-nourished male in no acute distress, in a pleasant mood.    SKIN: Focused examination of the lesion of concern was performed.  - Naso-facial sulcus, 0.4 x 0.4 cm pink dome shaped papule   - No other lesions of concern on areas examined.       Impression/Plan:  1. Nevus, L nasal ala     Patient has a bothersome nevus on the left nasaofacial sulcus that has a history of bleeding and catching while shaving.     Risks, benefits, and process of surgical excision were discussed including possibility of damage to surrounding anatomical structures and infection. Patient is comfortable proceeding with the surgery; consent form was obtained. He will be scheduled at his convenience.    Excision with linear repair within nasofacial sulcus to minimize appearance of a scar.     Additionally discussed possibility of laser treatment (Smooth Beam). Patient was given referral for Dr. Cerrato if he decides against excision.     Patient decided to think about his options and schedule accordingly.     Photo obtained at today's visit for inclusion in medical record.     Follow-up as scheduled for excision.     Staff Involved:    Scribe Disclosure  I, Walter Hidalgo, am serving as a scribe to document services personally performed by Dr. Garrick Ya DO, based on data collection and the provider's statements to me.     Provider Disclosure:   The documentation recorded by the scribe accurately reflects the services I personally performed and the decisions made by me.    Garrick Ya DO    Department of Dermatology  [facility]: Phone: [phone], Fax:[phone]  [facility]: Phone: [phone], Fax:  [phone]

## 2018-03-14 NOTE — MR AVS SNAPSHOT
After Visit Summary   3/14/2018    Anatoliy Boykin    MRN: [id number]           Patient Information     Date Of Birth          1970        Visit Information        Provider Department      3/14/2018 3:15 PM Garrick Ya MD [facility] Dermatologic Surgery        Care Instructions    Laser Procedure:     Referral to [facility], [address] [place] [address], Phone: [phone]        Dermatological Surgery Unit  Garrick Ya DO    **Schedule excision**     Pre-Surgical Instruction Sheet    1. Take all normal medications -- unless otherwise indicated by your physician.    2. If you have an artifical heart valve, or joint replacement within two years, take your antibiotic as prescribed. Otherwise, we will administer antibiotic in our office.    3. You will need a  to be with you if your surgical site is close to your eyes. You can get swelling/bruising immediately after surgery and will go home with a big bulky bandage that could obstruct your view of driving safely.    4. Wear comfortable clothing -- preferably a button down shirt or a loose fitted shirt. (to avoid pulling over pressure bandage off when you get home) If your surgery site is on your lower extremity, try wearing loose fitted pants. If your surgery site is on your upper extremity, try wearing a short-sleeve shirt.    5. Women - do NOT wear make-up. We will be washing it off anyone needing surgery on the face.    6. Do NOT stop blood thinning medication unless instructed to do so by your surgeon. This will include: Warfarin, Coumadin, Jantoven, Aspirin, Plavix and Pradaxa.    7. Tylenol is a good alternative to take for headaches and pain, and can be taken throughout your surgery and postoperative recovery.    8. If your surgery is on your trunk, arms, hands, legs, feet, fingernail or a toenail, please wash the area with Hibiclens, an over-the-counter antiseptic soap, the night  before and morning of your surgery.     If you have any questions, please feel free to contact us.  Phone numbers:  During business hours (M-F 8:00-4:30 p.m.)  [facility]-  [phone] Option 1 appt. desk  [phone]  Option 3 nurse triage line  ---------------------------------------------------------  Evenings/Weekends/Holidays  Hospital - [phone]   TTY for hearing qdnrghuf-[phone]  *Ask  to page dermatologist on-call  Emergency Cvsp-[phone]  TTY for hearing impaired- [phone]              Follow-ups after your visit        Who to contact     Please call your clinic at [phone] to:    Ask questions about your health    Make or cancel appointments    Discuss your medicines    Learn about your test results    Speak to your doctor            Additional Information About Your Visit        IIIMO[employer] Information     [employer] gives you secure access to your electronic health record. If you see a primary care provider, you can also send messages to your care team and make appointments. If you have questions, please call your primary care clinic.  If you do not have a primary care provider, please call [phone] and they will assist you.      [employer] is an electronic gateway that provides easy, online access to your medical records. With [employer], you can request a clinic appointment, read your test results, renew a prescription or communicate with your care team.     To access your existing account, please contact your [facility] or call [phone] for assistance.        Care EveryWhere ID     This is your Care EveryWhere ID. This could be used by other organizations to access your [place] medical records  [id number]         Blood Pressure from Last 3 Encounters:   10/30/17 119/76   08/28/17 117/77   11/17/16 118/78    Weight from Last 3 Encounters:   10/30/17 86.6 kg (191 lb)   08/28/17 89.4 kg (197 lb)   11/17/16 90.8  kg (200 lb 4 oz)              Today, you had the following     No orders found for display       Primary Care Provider Office Phone # Fax #    Kareemsheyla Mccarthyan Tim Coleman [phone] [address]  [facility] [address]        Equal Access to Services     JARRET ROMAN : Hadii aad ku hadasho Soomaali, waaxda luqadaha, qaybta kaalmada adeegyada, waxay idiin hayaan adeeg terencenestor lameln . So [facility] [phone].    ATENCIÓN: Si habla español, tiene a ibanez disposición servicios gratuitos de asistencia lingüística. Mosesgardenia al [phone].    We comply with applicable federal civil rights laws and [place] laws. We do not discriminate on the basis of race, color, national origin, age, disability, sex, sexual orientation, or gender identity.            Thank you!     Thank you for choosing [facility] DERMATOLOGIC SURGERY  for your care. Our goal is always to provide you with excellent care. Hearing back from our patients is one way we can continue to improve our services. Please take a few minutes to complete the written survey that you may receive in the mail after your visit with us. Thank you!             Your Updated Medication List - Protect others around you: Learn how to safely use, store and throw away your medicines at www.disposemymeds.org.          This list is accurate as of 3/14/18  3:54 PM.  Always use your most recent med list.                   Brand Name Dispense Instructions for use Diagnosis    * escitalopram 10 MG tablet    LEXAPRO    90 tablet    Take 1 tablet (10 mg) by mouth daily    LOWELL (generalized anxiety disorder)       * escitalopram 20 MG tablet    LEXAPRO    90 tablet    Take 1 tablet (20 mg) by mouth daily    Generalized anxiety disorder       * Notice:  This list has 2 medication(s) that are the same as other medications prescribed for you. Read the directions carefully, and ask your doctor or other care provider to review them with you.

## 2018-03-14 NOTE — NURSING NOTE
Chief Complaint   Patient presents with     Consult     Mr. Boykin is here today to talk about getting a mole removed from the left side of the nasal ala.      Tish Rudd, CMA

## 2019-11-06 ENCOUNTER — HEALTH MAINTENANCE LETTER (OUTPATIENT)
Age: 49
End: 2019-11-06

## 2020-11-12 ENCOUNTER — IMMUNIZATION (OUTPATIENT)
Dept: NURSING | Facility: CLINIC | Age: 50
End: 2020-11-12
Payer: COMMERCIAL

## 2020-11-12 DIAGNOSIS — Z23 NEED FOR PROPHYLACTIC VACCINATION AND INOCULATION AGAINST INFLUENZA: Primary | ICD-10-CM

## 2020-11-12 PROCEDURE — 90686 IIV4 VACC NO PRSV 0.5 ML IM: CPT

## 2020-11-12 PROCEDURE — 90471 IMMUNIZATION ADMIN: CPT

## 2020-11-29 ENCOUNTER — HEALTH MAINTENANCE LETTER (OUTPATIENT)
Age: 50
End: 2020-11-29

## 2021-09-19 ENCOUNTER — HEALTH MAINTENANCE LETTER (OUTPATIENT)
Age: 51
End: 2021-09-19

## 2022-01-09 ENCOUNTER — HEALTH MAINTENANCE LETTER (OUTPATIENT)
Age: 52
End: 2022-01-09

## 2022-11-21 ENCOUNTER — HEALTH MAINTENANCE LETTER (OUTPATIENT)
Age: 52
End: 2022-11-21

## 2023-04-16 ENCOUNTER — HEALTH MAINTENANCE LETTER (OUTPATIENT)
Age: 53
End: 2023-04-16

## 2024-06-23 ENCOUNTER — HEALTH MAINTENANCE LETTER (OUTPATIENT)
Age: 54
End: 2024-06-23

## 2024-08-02 ENCOUNTER — OFFICE VISIT (OUTPATIENT)
Dept: FAMILY MEDICINE | Facility: CLINIC | Age: 54
End: 2024-08-02
Payer: COMMERCIAL

## 2024-08-02 VITALS
SYSTOLIC BLOOD PRESSURE: 138 MMHG | RESPIRATION RATE: 20 BRPM | DIASTOLIC BLOOD PRESSURE: 88 MMHG | OXYGEN SATURATION: 97 % | HEART RATE: 88 BPM | TEMPERATURE: 96.4 F | HEIGHT: 72 IN | WEIGHT: 236.2 LBS | BODY MASS INDEX: 31.99 KG/M2

## 2024-08-02 DIAGNOSIS — T78.40XD ALLERGY, SUBSEQUENT ENCOUNTER: ICD-10-CM

## 2024-08-02 DIAGNOSIS — R06.83 SNORING: Primary | ICD-10-CM

## 2024-08-02 PROCEDURE — 99203 OFFICE O/P NEW LOW 30 MIN: CPT | Performed by: FAMILY MEDICINE

## 2024-08-02 ASSESSMENT — PAIN SCALES - GENERAL: PAINLEVEL: NO PAIN (0)

## 2024-08-02 NOTE — PATIENT INSTRUCTIONS
Allergies -  you can start with over the counter Allegra, Claritin or Zyrtec. Take medication for daily for one month. If symptoms have not improved then you can switch to a different antihistamine.   If your symptoms are worse you can take an additional benadryl at night.

## 2024-08-02 NOTE — PROGRESS NOTES
"  Assessment & Plan     Snoring  - Adult Sleep Eval & Management  Referral; Future    Allergy, subsequent encounter  - advised below   - Adult Allergy/Asthma  Referral; Future    Advised to schedule physical       BMI  Estimated body mass index is 31.65 kg/m  as calculated from the following:    Height as of this encounter: 1.84 m (6' 0.44\").    Weight as of this encounter: 107.1 kg (236 lb 3.2 oz).         Patient Instructions   Allergies -  you can start with over the counter Allegra, Claritin or Zyrtec. Take medication for daily for one month. If symptoms have not improved then you can switch to a different antihistamine.   If your symptoms are worse you can take an additional benadryl at night.       Davis Cope is a 54 year old, presenting for the following health issues:  Sinus Problem, Snoring, and Allergy Consult      8/2/2024     9:55 AM   Additional Questions   Roomed by Jade   Accompanied by alone         8/2/2024     9:55 AM   Patient Reported Additional Medications   Patient reports taking the following new medications none     Sinus Problem     History of Present Illness       Reason for visit:  Excessive snoring.  I'd also like to get a new allergy test.  Symptom onset:  More than a month  Symptoms include:  Wife reports very loud snoring.  Symptom intensity:  Moderate  Symptom progression:  Staying the same  Had these symptoms before:  Yes  Has tried/received treatment for these symptoms:  No    He eats 2-3 servings of fruits and vegetables daily.He consumes 1 sweetened beverage(s) daily.He exercises with enough effort to increase his heart rate 20 to 29 minutes per day.  He exercises with enough effort to increase his heart rate 3 or less days per week.      Snoring slightly getting worse recently. He has had snoring for more than 6 years. He has gained a lot for worse in the last year which is a factor. He has no witnessed apnea. He feels rested when he wakes up.  They " "recently bought a house and  lots of yard work.   He has possible allergies to grass. If he works in the yard he gets itchy eyes, cough, sinus pain and nasal congestion. Symptoms have been present for the last five years, not present in the winter.    Allergy testing ~ 10 years ago.           Objective    BP (!) 140/90 (BP Location: Right arm, Patient Position: Sitting, Cuff Size: Adult Large)   Pulse 88   Temp (!) 96.4  F (35.8  C) (Temporal)   Resp 20   Ht 1.84 m (6' 0.44\")   Wt 107.1 kg (236 lb 3.2 oz)   SpO2 97%   BMI 31.65 kg/m    Body mass index is 31.65 kg/m .  Physical Exam               Signed Electronically by: Nataliia Reed MD    "

## 2024-10-24 ENCOUNTER — OFFICE VISIT (OUTPATIENT)
Dept: ALLERGY | Facility: CLINIC | Age: 54
End: 2024-10-24
Attending: FAMILY MEDICINE
Payer: COMMERCIAL

## 2024-10-24 VITALS — WEIGHT: 238.2 LBS | OXYGEN SATURATION: 98 % | BODY MASS INDEX: 31.91 KG/M2 | HEART RATE: 105 BPM

## 2024-10-24 DIAGNOSIS — T78.40XD ALLERGY, SUBSEQUENT ENCOUNTER: ICD-10-CM

## 2024-10-24 DIAGNOSIS — Z88.0 PENICILLIN ALLERGY: Primary | ICD-10-CM

## 2024-10-24 PROCEDURE — 99204 OFFICE O/P NEW MOD 45 MIN: CPT | Mod: 25

## 2024-10-24 PROCEDURE — 95004 PERQ TESTS W/ALRGNC XTRCS: CPT

## 2024-10-24 NOTE — PATIENT INSTRUCTIONS
Azelastine (Astepro ) nasal spray, 1-2 sprays in each nostril twice daily as needed.  This medication may be bitter taste, please consider brushing your teeth after using this nasal spray.     If you feel like azelastine nasal spray is not adequately controlling your nasal symptoms, start intranasal fluticasone (Flonase) 1-2 sprays in each nostril once daily.     Cetirizine (Zyrtec) 10 mg, levocetirizine (Xyzal) 5 mg, or fexofenadine (Allegra) 180 mg    Pataday (olapatadine) 1 drop/eye daily as needed.  Keep this medication in the refrigerator for comfort of your eyes.  If you wear contacts, please wait at least 10 minutes before placing the contacts into your eye.     NASAL SALINE IRRIGATION INSTRUCTIONS     You will be starting nasal saline irrigations and will need to obtain the following:       - NeilMed Sinus Rinse 8 oz Kit (or prefer device)    - Distilled or filtered water   - Normal saline salt packets     Place filtered or distilled water into the NeilMed bottle up to the fill line (DO NOT USE TAP OR WELL WATER).  Place the pre-made salt packet in the 8 oz of saline.  Shake the bottle to suspend into solution.  Lean head forward over a sink or a basin.  Rinse each side of the nose with one-half of the bottle (each squeeze is about one-half of the bottle). Rinse the nose daily.      If you use topical nasal sprays, apply following irrigation.     Video example: https://www.youtube.com/watch?v=[email or web address]      SINUS SALINE RINSE RECIPE    This can be completed 1-2 times daily, or as needed    Ingredients  1.  Pickling or saman salt-containing no iodide, anti-caking agents or preservatives. Do not use other salt such as table salt as these can be irritating to the nasal lining  2.  Baking soda  3.  8 ounces (1 cup) of lukewarm distilled or boiled water    In a clean container, mix 3 teaspoons of iodide-free salt with 1 teaspoon of baking soda and store in a small airtight container. Add 1 teaspoon of the  mixture to 8 ounces (1 cup) of lukewarm distilled or boiled water. Use less dry ingredients to make a weaker solution if burning or stinging is experienced.    Notes - if you are boiling tap water, cool water prior to use to prevent injury.   - do not use tap water unless it has been sterilized by boiling water prior to use.       If nasal saline irrigation is not tolerated, nasal saline sprays could be beneficial to remove allergens from the mucous membrane.    AEROALLERGEN AVOIDANCE INSTRUCTIONS    DUST MITES  Dust mites can never be entirely eliminated in the house no matter how clean your house is. Dust mites are attracted to warm, moist areas and feed on dead skin flakes. Here are tips to minimize dust mites in your home.   Encase pillows and mattress/box springs in zippered allergy covers.   Wash bedding in hot water (at least 130 F) every 7-14 days.   Avoid curtains, carpet, and upholstered furniture if possible.   Use HEPA air filters and a HEPA filter vacuum . Change filters monthly. Vacuum weekly.   Keep bedroom simple, avoiding clutter, so it can quickly be dusted.   Cover heating vents with vent filters.   Keep stuffed toys in a closed container and wash or freeze regularly.   Keep clothing in the closet with the door closed.   Humidity between 40 and 50%    Common seasonal allergens:    Common perennial (year?round) allergens:  Dust mites ? a very common indoor allergen that causes significant allergy in most of the [place]; highest allergen levels are in the bed    Sublingual immunotherapy  [email or web address]    IMMUNOTHERAPY:    Background: Immunotherapy (allergy shots) is a long-term approach to decrease allergic sensitivity. It is the only therapy that has the possibility of making an individual less allergic to the items for which they will be treated. It does not provide immediate symptom relief. It takes time for the body to adjust to the new (immunologic) challenges to which we  "are exposing it. Patients frequently need to continue to use medications along with immunotherapy to control their allergy symptoms, at least through the building process.    Goals: Over the 3-5 year course of the shots, we aim to be able to minimize medications and to eventually develop a long-term tolerance to the injected proteins that will allow us to stop the injections and have the patient maintain control of their symptoms for years or decades without getting them any longer.    The Injections are Individualized: Immunotherapy involves administering a customized preparation based on your allergy test results. You are only treated with items to which you are sensitive.    Time Course: Improved symptom control is generally first noticed after 6-12 months on the allergy injections; this occurs in ~70-90% of individuals. The majority of patients are able to stop the injections after 3-5 years and are able to maintain long term benefit.    Frequency of Injections: During the first 6-8 months, we recommend that you come weekly while we increase the dose or concentration in a stepwise manner. You may get shots during this \"building phase\" every 3-14 days. Once you have reached the \"maintainence dose\" we will extend out the time in between the injections. Eventually, the time interval may gradually extended out to every 4 weeks for the inhaled allergens.    Number of Injections/Serums: You will receive anywhere from 1 to 4 injections per visit, depending on your sensitivities and the number of allergens we can include in each serum to maintain an appropriate therapeutic dose.    Preventing Reactions: Taking an antihistamine such as Levocetirizine (Xyzal), Cetirizine (Zyrtec) or Fexofenadine (Allergra) 30 minutes prior to each allergy shot injection can decrease the local shot reactions and likely also minimize the risk for systemic reactions as well. This \"Pre-Treatment\" for the allergy shots is recommended for all " "people starting Immunotherapy injections.    Timing of Shot Reactions & the Waiting Period: If a severe reaction were to occur it is most likely to occur fairly quickly after an allergy shot. Over 90% of these reactions will occur within the first 30 minutes after the injection. This is the rationale for having you wait in clinic for 30 minutes after an allergy shot. You may also be required to have a prescription for an epinephrine auto-injector which you will need to bring with you to the clinic for each injection visit.    Notify Us of Any Shot Reactions: If you have any new symptoms or \"feel different\" after your allergy shot, it is crucial that you notify the nurse of your symptoms immediately. This will allow us to determine how to proceed with your treatment in the safest manner possible.    Local Shot Reactions: These injections frequently cause redness, warmth, swelling or itching at the injection site as we are injecting into your skin the things to which you are allergic. If this reaction is transient (lasting less than 24 hours) no dosage adjustment is necessary. If it lasts more than 24 hours, or is very large, your building schedule may be modified to decrease the risk of a systemic reaction.    Treatment of Local Reactions: Local reactions are a common event with the allergy shots, and there are a few things that you can do to help prevent and treat these reactions. After you get the injection immediately. 1) Apply ice/cold packs. Let the shot nurse know that you would like a cold pack and they will try to arrange this for you. 2) Apply a steroid Cream or Ointment to the site of the shot to help prevent inflammation or treat it if it has already developed.     Systemic Shot Reactions: Reactions to immunotherapy may not be limited to the injection site and may include nose or eyes symptoms consistent with increased allergy symptoms, a tickle in the throat, throat discomfort, difficulty speaking or " swallowing, coughing, wheezing, asthma attack, nausea, vomiting, cramping, severe abdominal pain, or uterine cramps in women. Additional risks include laryngeal spasm and edema (airway narrowing), shock and decreased heart function. These severe and multi-system reactions are called anaphylaxis and are medical emergency.     Treatment of Systemic Reactions: Once our staff is aware of any symptoms that may have developed, you will be evaluated and treatment will be instituted as necessary. This may include antihistamines, subcutaneous or intramuscular injection of epinephrine, as well as inhaled or nebulized therapy. These treatments are not optional. Overwhelming evidence has shown that minimal treatment or delay in initiating treatment increases the risk of death from anaphylaxis. Due to the risk that our clinic incurs by administering allergy shots, if treatments for a reaction are absolutely refused or if you do not stay for the FULL 30 MINUTE waiting period, the option of receiving the allergy shots is forfeited, and you will not be able to receive any further injections (further notification, aside from this statement, may not be given).    Beta-Blockers & Immunotherapy: If another physician has prescribed or wants to start a medication for your heart/blood pressure called a beta-blocker (i.e. atenolol, metoprolol, carvedilol, etc.) this may need to be changed or stopped while you are on Immunotherapy as it can interfere with treating severe reactions. It is your responsibility to discuss this with the physician who ordered the medication prior to notifying us that you would like to start the injections. If we discover that you are receiving Immunotherapy injections and taking a Beta-Blocker, we will hold off on administering any further shots until you have discussed this with the prescribing physician and your allergist.    Yearly Billing & Cost: Your entire series of shots for the next 6-12 months are  generated when the serums are prepared. This is generally a significant cost. Once the serums are generated they cannot be returned. Therefore, we recommend that every patient check with their health insurance company to find out what your out-of-pocket expenses would be. If your insurance plan has a deductible, you may be responsible for the entire cost out of pocket until your deductible is met. Your insurance company will have a patient  that is available to discuss your specific situation and we encourage you to utilize them if you have any questions or concerns. When your serums are due to be refilled you will again be billed for 6-12 months of treatment at once.    Cluster Protocols: Some individuals may want to try to get through the build up period faster. This may be able to be achieved using a Cluster Protocol, where multiple injections are given on one day. The waiting period is still required after each injection, so a longer period is spent during these injection visits (plan on ~2-3 hours each day). There is also a greater risk for local reactions with this process. You can discuss with your allergist whether this protocol is appropriate for you, and if you would like to try it, include this in the message you leave for the shot nurse when notifying us that you want to start the injections.    Starting Allergy Shots: As there are many decisions and moving parts that go into starting Immunotherapy injections, as a rule we do not start the shots after the first visit. We want you to be comfortable committing to this, as there is a large time commitment involved, and erratic adherence to the shot schedule will only end with disappointing results. If you have decided to go ahead with the Immunotherapy injections, send a message through [employer] or call the clinic to schedule an immunotherapy consult appointment. You will need to sign a consent form in the clinic before we can proceed  with treatment.     We will need you to let us know 2 things:  1. That you called your insurance company to review your insurance coverage and agree to any out of pocket costs that may arise from the treatment  2. If you want to do the Cluster Protocol or the Standard protocol      These are the billing and diagnosis codes that your insurance company may need to help you determine if allergy shots are covered and what potential out of pocked costs you may have. You may also want to contact the [facility]/Cost of Care Estimate Line at [phone] for more information.      Traditional Immunotherapy = 65110   - Billed each visit to allergy shot clinic    Cluster/Rapid Immunotherapy = 06614  - Billed hourly at each visit, number of units billed depends on the length of your visit. (cluster build is typically a minimum of 10 total units over multiple visits)    Allergy Shot Serum: 62747 - the amount of serum in total varies depending on how many allergy shots you will need. At the start of treatment, each injection is billed for 30 units. Treatment consists of a minimum of 1 injection up to a maximum of 4 injections depending on how many allergens are included in the treatment.  (1 injection/serum = 30 units) (2 injections/serums = 60 units) (3 injections/serums = 90 units) (4 injections/serums = 120 units)    Potential Diagnosis Codes:    Allergic Rhinitis Due to Dust Mite or Mold - J30.89    Allergic Conjunctivitis - H10.13

## 2024-10-24 NOTE — LETTER
10/24/2024      Anatoliy Boykin  [address] [place] [id number]      Dear Colleague,    Thank you for referring your patient, Anatoliy Boykin, to the [facility]. Please see a copy of my visit note below.    Anatoliy Boykin was seen in the Allergy Clinic at [facility].    Anatoliy Boykin is a 54 year old male  being seen today for ongoing evaluation of allergies.  Referral from,     Nataliia Reed MD [facility]     History of Present Illness:     Rhinoconjunctivitis    The onset of symptoms: Greater than 10 years, patient reports he has seen an allergist in the past, is unsure of testing results.  Patient reports his allergy symptoms have been continuing to worsen, with significant allergy symptoms despite over-the-counter allergy medications.  Perennial/seasonally-exacerbated (Perennial) chronic nasal symptoms (itch, clear rhinorrhea, stuffiness, and sneezing), postnasal drip and ocular symptoms (itching, redness, and watering).  A variety of oral antihistamines have been used, patient has recently tried loratadine, without significant improvement.  There is no history of PE tubes, sinus surgeries, or tonsillectomy/adenoidectomy.   Childhood penicillin allergy, unknown reaction per patient.    Patient reports that he is seeing the sleep provider regarding his nasal snoring in December.    [facility]    Question 10/24/2024  8:44 AM CDT - Filed by Patient   Reason for visit: Nasal or sinus symptoms    Eye symptoms   Nasal/ Sinus/ Eye Symptoms    When did your symptoms begin?    What symptoms do you have? Itchy eyes    Watery eyes    Red eyes    Sneezing    Stuffy nose    Sinus pressure    Drainage down the throat    Headaches   Any prior sinus surgeries? No   History of nasal polyps? No   History of sinus infections? No   Have you tried pills/oral medications? Yes   Which ones? Over the counter  Loratadine tablets, Benadryl   Have you tried nasal sprays? No   Have you used eye drops? No   When do symptoms occur? Year-round    Indoors    Outdoors   What makes symptoms worse? Animals    Dust    Mowing the lawn    Raking leaves    Scents/perfumes   Environmental and Social History    Place of Residence: House   Do you have Central Air Conditioning? Yes   Type of Heating System: Forced air   Wood burning stove or fireplace: No   Occupation: Library worker   Pets: No   Do you smoke cigarettes: No   Do you use an e-cigarette or vape? No   Does anyone living in your home smoke or vape? No   Family History    Do your parents, siblings, or children have asthma? No   Do your parents, siblings, or children have environmental allergies? No   Do your parents, siblings, or children have food allergies? No   Do your parents, siblings, or children have eczema? No     Past Medical History:   Diagnosis Date     social anxiety disorder        No past surgical history on file.    No current outpatient medications on file.  Allergies   Allergen Reactions     Pcn [Penicillins]      As child, pt does not know reaction       Family History   Problem Relation Age of Onset     Hypertension Mother      EYE* Mother         Cataracts      Hypertension Father      EYE* Father         Cataracts      Arrhythmia Father      Cancer Maternal Grandmother      Colon Cancer Maternal Grandmother          at 66     Hypertension Maternal Uncle      Diabetes Maternal Uncle      Myocardial Infarction Maternal Grandfather          at 83     Arrhythmia Maternal Grandfather      Hypertension Maternal Grandfather      Myocardial Infarction Paternal Grandmother          72     Kidney Disease Paternal Grandfather      Cancer Paternal Uncle      Cancer Paternal Aunt          EXAM:   There were no vitals taken for this visit.    Physical Exam  Constitutional:       General: He is not in acute distress.     Appearance: Normal appearance. He  is not ill-appearing.   HENT:      Nose: Rhinorrhea present. No nasal deformity, septal deviation or congestion.      Right Turbinates: Not enlarged, swollen or pale.      Left Turbinates: Not enlarged, swollen or pale.      Comments: Moderate nasal turbinate enlargement     Mouth/Throat:      Mouth: Mucous membranes are moist.      Pharynx: Oropharynx is clear. Uvula midline. No posterior oropharyngeal erythema.      Tonsils: 0 on the right. 0 on the left.   Eyes:      General: No allergic shiner.        Right eye: No discharge.         Left eye: No discharge.      Conjunctiva/sclera: Conjunctivae normal.   Cardiovascular:      Rate and Rhythm: Normal rate and regular rhythm.      Heart sounds: Normal heart sounds, S1 normal and S2 normal.   Pulmonary:      Effort: Pulmonary effort is normal. No prolonged expiration.      Breath sounds: Normal breath sounds. No decreased air movement. No wheezing.   Neurological:      Mental Status: He is alert.   Psychiatric:         Mood and Affect: Mood normal.         Behavior: Behavior normal.         Judgment: Judgment normal.         WORKUP: Skin testing, appropriate positive and negative controls     Percutaneous    Environmental Testing     Ordering Provider :  Aubrie Schreiber PA-C    Testing Technician : MORIS    Date: 10/24/2024      Time: 3:35 PM       (W/F in millimeters)    Allergen      Concentration : Manufacture     Georges, white  1:20 H   0/0   Birch mix 1:20 H 0/0   [place], common 1:20 H 0/0   4. Elm, American 1:20 H 0/0   5. [place], Shagbark 1:20 H 0/0   6. Maple, Hard/Sugar 1:20 H 0/0   7. [place] Mix 1:20 H 0/0   8. Oak, Red 1:20 H 0/0   9. [place], American 1:20 H 0/0   10. [place] Tree 1:20 H 0/0   11. Dp Mite 30,000 A U /ML H 5/10   12. Df Mite 30,000 A U /ML H 7/15   13. Grass Mix # 4 10,000 B A U /ML H 0/0   14. Terrence grass 1:20 H 0/0   15.Cockroach mix 1:10 H 0/0   16. Alternaria Tenuis 1:10 H 0/0   17. Aspergillus Fumigatus 1:10 H 0/0   18.  Homodendrum cladosporioides 1:10 H 0/0   19. Penicillium notatum 1:10 H 0/0   20. Epicoccum 1:10 H 0/0   21. Ragweed, Short 1:20 H 0/0   22. Dock, Sorrel 1:20 H 0/0   23. Lamb's Quarters 1:20 H 0/0   24. Pigweed, Rough Redroot 1:20 H 0/0   25. Plantain, English 1:20 H 0/0   26. Sagebrush, Mugwort 1:20 H 0/0   27. Cat 10,000  B A U /ML H 0/0   28. AP Dog 1:100 H 0/0   29. Neg. Control: 50% glycerine/saline H 0/0   30. Pos. Control: histamine 6mg/ML 7/30     At today's visit, the patient and I engaged in an informed consent discussion about allergy testing.  We discussed skin testing, blood testing, and the alternative of not undergoing any testing. The patient has a preference for skin testing. We then discussed the risks and benefits of skin testing.  The patient understands skin testing risks can include, but are not limited to, urticaria, angioedema, shortness of breath, and severe anaphylaxis.  The benefits include, but are not limited, to evaluation for allergens causing symptoms.  After answering the patient's questions they have agreed to proceed with skin testing.       ASSESSMENT/PLAN:  Anatoliy Boykin is a 54 year old male with rhinoconjunctivitis, dust mite allergy, and childhood penicillin allergy listed in chart.    Rhinoconjunctivitis  Dust mite allergy  Azelastine (Astepro ) nasal spray, 1-2 sprays in each nostril twice daily as needed.  This medication may be better taste, please consider brushing your teeth after using this nasal spray.     If you feel like azelastine nasal spray is not adequately controlling your nasal symptoms, start intranasal fluticasone (Flonase) 1-2 sprays in each nostril once daily.     Cetirizine (Zyrtec) 10 mg, levocetirizine (Xyzal) 5 mg, or fexofenadine (Allegra) 180 mg    Pataday (olapatadine) 1 drop/eye daily as needed.      Patient was given instructions for nasal saline irrigation, 1-2 times daily as needed.    If nasal saline irrigation is not tolerated, nasal  saline sprays could be beneficial to remove allergens from the mucous membrane.    Patient was given instructions for environmental modifications techniques.    We did briefly review that patient could be a candidate for sublingual allergy immunotherapy, or allergy shots.  Patient was given information in clinic, and instructed to make a consult appointment with Dr. Cunningham if he would like to pursue this treatment option.    Penicillin allergy  His mother has always told him he has a penicillin allergy, he is unsure of what the reaction to penicillin was during his childhood.  -Referral to Dr. Cunningham for further evaluation of penicillin allergy    Follow-up as needed.      Thank you for allowing me to participate in the care of Anatoliy Boykin.      I spent 48 minutes on the date of the encounter doing chart review, history and exam, documentation and further coordination as noted above exclusive of separately reported interpretations.     This excludes time spent for skin testing interpretation.     Please note that this note consists of symbols derived from keyboarding, dictation and/or voice recognition software. As a result, there may be errors in the script that have gone undetected. Please consider this when interpreting information found in this chart.     Aubrie Schreiber PA-C  [facility]      Again, thank you for allowing me to participate in the care of your patient.        Sincerely,        Aubrie Schreiber PA-C

## 2024-10-24 NOTE — PROGRESS NOTES
Anatoliy Boykin was seen in the Allergy Clinic at [facility].    Anatoliy Boykin is a 54 year old male  being seen today for ongoing evaluation of allergies.  Referral from,     Nataliia Reed MD [facility]     History of Present Illness:     Rhinoconjunctivitis    The onset of symptoms: Greater than 10 years, patient reports he has seen an allergist in the past, is unsure of testing results.  Patient reports his allergy symptoms have been continuing to worsen, with significant allergy symptoms despite over-the-counter allergy medications.  Perennial/seasonally-exacerbated (Perennial) chronic nasal symptoms (itch, clear rhinorrhea, stuffiness, and sneezing), postnasal drip and ocular symptoms (itching, redness, and watering).  A variety of oral antihistamines have been used, patient has recently tried loratadine, without significant improvement.  There is no history of PE tubes, sinus surgeries, or tonsillectomy/adenoidectomy.   Childhood penicillin allergy, unknown reaction per patient.    Patient reports that he is seeing the sleep provider regarding his nasal snoring in December.    [facility]    Question 10/24/2024  8:44 AM CDT - Filed by Patient   Reason for visit: Nasal or sinus symptoms    Eye symptoms   Nasal/ Sinus/ Eye Symptoms    When did your symptoms begin?    What symptoms do you have? Itchy eyes    Watery eyes    Red eyes    Sneezing    Stuffy nose    Sinus pressure    Drainage down the throat    Headaches   Any prior sinus surgeries? No   History of nasal polyps? No   History of sinus infections? No   Have you tried pills/oral medications? Yes   Which ones? Over the counter Loratadine tablets, Benadryl   Have you tried nasal sprays? No   Have you used eye drops? No   When do symptoms occur? Year-round    Indoors    Outdoors   What makes symptoms worse? Animals    Dust    Mowing the lawn    Raking leaves     Scents/perfumes   Environmental and Social History    Place of Residence: House   Do you have Central Air Conditioning? Yes   Type of Heating System: Forced air   Wood burning stove or fireplace: No   Occupation: Library worker   Pets: No   Do you smoke cigarettes: No   Do you use an e-cigarette or vape? No   Does anyone living in your home smoke or vape? No   Family History    Do your parents, siblings, or children have asthma? No   Do your parents, siblings, or children have environmental allergies? No   Do your parents, siblings, or children have food allergies? No   Do your parents, siblings, or children have eczema? No     Past Medical History:   Diagnosis Date    social anxiety disorder        No past surgical history on file.    No current outpatient medications on file.  Allergies   Allergen Reactions    Pcn [Penicillins]      As child, pt does not know reaction       Family History   Problem Relation Age of Onset    Hypertension Mother     EYE* Mother         Cataracts     Hypertension Father     EYE* Father         Cataracts     Arrhythmia Father     Cancer Maternal Grandmother     Colon Cancer Maternal Grandmother          at 66    Hypertension Maternal Uncle     Diabetes Maternal Uncle     Myocardial Infarction Maternal Grandfather          at 83    Arrhythmia Maternal Grandfather     Hypertension Maternal Grandfather     Myocardial Infarction Paternal Grandmother          72    Kidney Disease Paternal Grandfather     Cancer Paternal Uncle     Cancer Paternal Aunt          EXAM:   There were no vitals taken for this visit.    Physical Exam  Constitutional:       General: He is not in acute distress.     Appearance: Normal appearance. He is not ill-appearing.   HENT:      Nose: Rhinorrhea present. No nasal deformity, septal deviation or congestion.      Right Turbinates: Not enlarged, swollen or pale.      Left Turbinates: Not enlarged, swollen or pale.      Comments: Moderate nasal  turbinate enlargement     Mouth/Throat:      Mouth: Mucous membranes are moist.      Pharynx: Oropharynx is clear. Uvula midline. No posterior oropharyngeal erythema.      Tonsils: 0 on the right. 0 on the left.   Eyes:      General: No allergic shiner.        Right eye: No discharge.         Left eye: No discharge.      Conjunctiva/sclera: Conjunctivae normal.   Cardiovascular:      Rate and Rhythm: Normal rate and regular rhythm.      Heart sounds: Normal heart sounds, S1 normal and S2 normal.   Pulmonary:      Effort: Pulmonary effort is normal. No prolonged expiration.      Breath sounds: Normal breath sounds. No decreased air movement. No wheezing.   Neurological:      Mental Status: He is alert.   Psychiatric:         Mood and Affect: Mood normal.         Behavior: Behavior normal.         Judgment: Judgment normal.         WORKUP: Skin testing, appropriate positive and negative controls     Percutaneous    Environmental Testing     Ordering Provider :  Aubrie Schreiber PA-C    Testing Technician : MORIS    Date: 10/24/2024      Time: 3:35 PM       (W/F in millimeters)    Allergen      Concentration : Manufacture     Georges, white  1:20 H   0/0   Birch mix 1:20 H 0/0   [place], common 1:20 H 0/0   4. Elm, American 1:20 H 0/0   5. [place], Shagbark 1:20 H 0/0   6. Maple, Hard/Sugar 1:20 H 0/0   7. [place] Mix 1:20 H 0/0   8. Oak, Red 1:20 H 0/0   9. [place], American 1:20 H 0/0   10. [place] Tree 1:20 H 0/0   11. Dp Mite 30,000 A U /ML H 5/10   12. Df Mite 30,000 A U /ML H 7/15   13. Grass Mix # 4 10,000 B A U /ML H 0/0   14. Terrence grass 1:20 H 0/0   15.Cockroach mix 1:10 H 0/0   16. Alternaria Tenuis 1:10 H 0/0   17. Aspergillus Fumigatus 1:10 H 0/0   18. Homodendrum cladosporioides 1:10 H 0/0   19. Penicillium notatum 1:10 H 0/0   20. Epicoccum 1:10 H 0/0   21. Ragweed, Short 1:20 H 0/0   22. Dock, Sorrel 1:20 H 0/0   23. Lamb's Quarters 1:20 H 0/0   24. Pigweed, Rough Redroot 1:20 H 0/0   25. Plantain,  English 1:20 H 0/0   26. Sagebrush, Mugwort 1:20 H 0/0   27. Cat 10,000  B A U /ML H 0/0   28. AP Dog 1:100 H 0/0   29. Neg. Control: 50% glycerine/saline H 0/0   30. Pos. Control: histamine 6mg/ML 7/30     At today's visit, the patient and I engaged in an informed consent discussion about allergy testing.  We discussed skin testing, blood testing, and the alternative of not undergoing any testing. The patient has a preference for skin testing. We then discussed the risks and benefits of skin testing.  The patient understands skin testing risks can include, but are not limited to, urticaria, angioedema, shortness of breath, and severe anaphylaxis.  The benefits include, but are not limited, to evaluation for allergens causing symptoms.  After answering the patient's questions they have agreed to proceed with skin testing.       ASSESSMENT/PLAN:  Anatoliy Boykin is a 54 year old male with rhinoconjunctivitis, dust mite allergy, and childhood penicillin allergy listed in chart.    Rhinoconjunctivitis  Dust mite allergy  Azelastine (Astepro ) nasal spray, 1-2 sprays in each nostril twice daily as needed.  This medication may be better taste, please consider brushing your teeth after using this nasal spray.     If you feel like azelastine nasal spray is not adequately controlling your nasal symptoms, start intranasal fluticasone (Flonase) 1-2 sprays in each nostril once daily.     Cetirizine (Zyrtec) 10 mg, levocetirizine (Xyzal) 5 mg, or fexofenadine (Allegra) 180 mg    Pataday (olapatadine) 1 drop/eye daily as needed.      Patient was given instructions for nasal saline irrigation, 1-2 times daily as needed.    If nasal saline irrigation is not tolerated, nasal saline sprays could be beneficial to remove allergens from the mucous membrane.    Patient was given instructions for environmental modifications techniques.    We did briefly review that patient could be a candidate for sublingual allergy immunotherapy, or  allergy shots.  Patient was given information in clinic, and instructed to make a consult appointment with Dr. Cunningham if he would like to pursue this treatment option.    Penicillin allergy  His mother has always told him he has a penicillin allergy, he is unsure of what the reaction to penicillin was during his childhood.  -Referral to Dr. Cunningham for further evaluation of penicillin allergy    Follow-up as needed.      Thank you for allowing me to participate in the care of Anatoliy Boykin.      I spent 48 minutes on the date of the encounter doing chart review, history and exam, documentation and further coordination as noted above exclusive of separately reported interpretations.     This excludes time spent for skin testing interpretation.     Please note that this note consists of symbols derived from keyboarding, dictation and/or voice recognition software. As a result, there may be errors in the script that have gone undetected. Please consider this when interpreting information found in this chart.     Aubrie Schreiber PA-C  [facility]

## 2024-10-25 ENCOUNTER — TELEPHONE (OUTPATIENT)
Dept: ALLERGY | Facility: CLINIC | Age: 54
End: 2024-10-25

## 2024-11-14 ASSESSMENT — SLEEP AND FATIGUE QUESTIONNAIRES
HOW LIKELY ARE YOU TO NOD OFF OR FALL ASLEEP WHILE SITTING AND TALKING TO SOMEONE: WOULD NEVER DOZE
HOW LIKELY ARE YOU TO NOD OFF OR FALL ASLEEP WHILE WATCHING TV: MODERATE CHANCE OF DOZING
HOW LIKELY ARE YOU TO NOD OFF OR FALL ASLEEP WHILE LYING DOWN TO REST IN THE AFTERNOON WHEN CIRCUMSTANCES PERMIT: MODERATE CHANCE OF DOZING
HOW LIKELY ARE YOU TO NOD OFF OR FALL ASLEEP WHILE SITTING AND READING: MODERATE CHANCE OF DOZING
HOW LIKELY ARE YOU TO NOD OFF OR FALL ASLEEP WHILE SITTING INACTIVE IN A PUBLIC PLACE: WOULD NEVER DOZE
HOW LIKELY ARE YOU TO NOD OFF OR FALL ASLEEP WHEN YOU ARE A PASSENGER IN A CAR FOR AN HOUR WITHOUT A BREAK: SLIGHT CHANCE OF DOZING
HOW LIKELY ARE YOU TO NOD OFF OR FALL ASLEEP IN A CAR, WHILE STOPPED FOR A FEW MINUTES IN TRAFFIC: WOULD NEVER DOZE
HOW LIKELY ARE YOU TO NOD OFF OR FALL ASLEEP WHILE SITTING QUIETLY AFTER LUNCH WITHOUT ALCOHOL: SLIGHT CHANCE OF DOZING

## 2024-11-15 ENCOUNTER — OFFICE VISIT (OUTPATIENT)
Dept: SLEEP MEDICINE | Facility: CLINIC | Age: 54
End: 2024-11-15
Attending: FAMILY MEDICINE
Payer: COMMERCIAL

## 2024-11-15 VITALS
HEART RATE: 98 BPM | HEIGHT: 72 IN | BODY MASS INDEX: 32.13 KG/M2 | DIASTOLIC BLOOD PRESSURE: 97 MMHG | SYSTOLIC BLOOD PRESSURE: 149 MMHG | OXYGEN SATURATION: 95 % | WEIGHT: 237.2 LBS

## 2024-11-15 DIAGNOSIS — G47.00 INSOMNIA, UNSPECIFIED TYPE: ICD-10-CM

## 2024-11-15 DIAGNOSIS — R40.0 DAYTIME SLEEPINESS: ICD-10-CM

## 2024-11-15 DIAGNOSIS — R06.81 WITNESSED APNEIC SPELLS: ICD-10-CM

## 2024-11-15 DIAGNOSIS — E66.811 OBESITY (BMI 30.0-34.9): ICD-10-CM

## 2024-11-15 DIAGNOSIS — R06.83 SNORING: Primary | ICD-10-CM

## 2024-11-15 PROCEDURE — 99203 OFFICE O/P NEW LOW 30 MIN: CPT

## 2024-11-15 NOTE — PATIENT INSTRUCTIONS
"          MY TREATMENT INFORMATION FOR SLEEP APNEA-  Anatoliy Boykin    DOCTOR : JAIMIE Hearn CNP    Am I having a sleep study at a sleep center?  --->Due to normal delays, you will be contacted within 2-4 weeks to schedule    Am I having a home sleep study?  --->Watch the video for the device you are using:    -/drop off device- https://www.[employer].com/watch?v=yGGFBdELGhk    Frequently asked questions:  1. What is Obstructive Sleep Apnea (BUSHRA)? BUSHRA is the most common type of sleep apnea. Apnea means, \"without breath.\"  Apnea is most often caused by narrowing or collapse of the upper airway as muscles relax during sleep.   Almost everyone has occasional apneas. Most people with sleep apnea have had brief interruptions at night frequently for many years.  The severity of sleep apnea is related to how frequent and severe the events are.   2. What are the consequences of BUSHRA? Symptoms include: feeling sleepy during the day, snoring loudly, gasping or stopping of breathing, trouble sleeping, and occasionally morning headaches or heartburn at night.  Sleepiness can be serious and even increase the risk of falling asleep while driving. Other health consequences may include development of high blood pressure and other cardiovascular disease in persons who are susceptible. Untreated BUSHRA can contribute to heart disease, stroke and diabetes.   3. What are the treatment options? In most situations, sleep apnea is a lifelong disease that must be managed with daily therapy. Medications are not effective for sleep apnea and surgery is generally not considered until other therapies have been tried. Your treatment is your choice . Continuous Positive Airway (CPAP) works right away and is the therapy that is effective in nearly everyone. An oral device to hold your jaw forward is usually the next most reliable option. Other options include postioning devices (to keep you off your back), weight loss, and surgery " including a tongue pacing device. There is more detail about some of these options below.  4. Are my sleep studies covered by insurance? Although we will request verification of coverage, we advise you also check in advance of the study to ensure there is coverage.    Important tips for those choosing CPAP and similar devices  REMEMBER-IF YOU RECEIVE A CALL FROM [phone]--> IT IS TO SETUP A DEVICE  For new devices, sign up for device NATHAN to monitor your device for your followup visits  We encourage you to utilize the [employer] nathan or website (https://SMCpros/) to monitor your therapy progress and share the data with your healthcare team when you discuss your sleep apnea.                                                    Know your equipment:  CPAP is continuous positive airway pressure that prevents obstructive sleep apnea by keeping the throat from collapsing while you are sleeping. In most cases, the device is  smart  and can slowly self-adjusts if your throat collapses and keeps a record every day of how well you are treated-this information is available to you and your care team.  BPAP is bilevel positive airway pressure that keeps your throat open and also assists each breath with a pressure boost to maintain adequate breathing.  Special kinds of BPAP are used in patients who have inadequate breathing from lung or heart disease. In most cases, the device is  smart  and can slowly self-adjusts to assist breathing. Like CPAP, the device keeps a record of how well you are treated.  Your mask is your connection to the device. You get to choose what feels most comfortable and the staff will help to make sure if fits. Here: are some examples of the different masks that are available: Magnetic mask aids may assist with use but there are safety issues that should be addressed when considering with magnets* ( see end of discussion).       Key points to remember on your journey with sleep apnea:  Sleep  study.  PAP devices often need to be adjusted during a sleep study to show that they are effective and adjusted right.  Good tips to remember: Try wearing just the mask during a quiet time during the day so your body adapts to wearing it. A humidifier is recommended for comfort in most cases to prevent drying of your nose and throat. Allergy medication from your provider may help you if you are having nasal congestion.  Getting settled-in. It takes more than one night for most of us to get used to wearing a mask. Try wearing just the mask during a quiet time during the day so your body adapts to wearing it. A humidifier is recommended for comfort in most cases. Our team will work with you carefully on the first day and will be in contact within 4 days and again at 2 and 4 weeks for advice and remote device adjustments. Your therapy is evaluated by the device each day.   Use it every night. The more you are able to sleep naturally for 7-8 hours, the more likely you will have good sleep and to prevent health risks or symptoms from sleep apnea. Even if you use it 4 hours it helps. Occasionally all of us are unable to use a medical therapy, in sleep apnea, it is not dangerous to miss one night.   Communicate. Call our skilled team on the number provided on the first day if your visit for problems that make it difficult to wear the device. Over 2 out of 3 patients can learn to wear the device long-term with help from our team. Remember to call our team or your sleep providers if you are unable to wear the device as we may have other solutions for those who cannot adapt to mask CPAP therapy. It is recommended that you sleep your sleep provider within the first 3 months and yearly after that if you are not having problems.   Use it for your health. We encourage use of CPAP masks during daytime quiet periods to allow your face and brain to adapt to the sensation of CPAP so that it will be a more natural sensation to awaken  to at night or during naps. This can be very useful during the first few weeks or months of adapting to CPAP though it does not help medically to wear CPAP during wakefulness and  should not be used as a strategy just to meet guidelines.  Take care of your equipment. Make sure you clean your mask and tubing using directions every day and that your filter and mask are replaced as recommended or if they are not working.     *Masks with magnets:  Updated Contraindications  Masks with magnetic components are contraindicated for use by patients where they, or anyone in close physical contact while using the mask, have the following:   Active medical implants that interact with magnets (i.e., pacemakers, implantable cardioverter defibrillators (ICD), neurostimulators, cerebrospinal fluid (CSF) shunts, insulin/infusion pumps)   Metallic implants/objects containing ferromagnetic material (i.e., aneurysm clips/flow disruption devices, embolic coils, stents, valves, electrodes, implants to restore hearing or balance with implanted magnets, ocular implants, metallic splinters in the eye)  Updated Warning  Keep the mask magnets at a safe distance of at least 6 inches (150 mm) away from implants or medical devices that may be adversely affected by magnetic interference. This warning applies to you or anyone in close physical contact with your mask. The magnets are in the frame and lower headgear clips, with a magnetic field strength of up to 400mT. When worn, they connect to secure the mask but may inadvertently detach while asleep.  Implants/medical devices, including those listed within contraindications, may be adversely affected if they change function under external magnetic fields or contain ferromagnetic materials that attract/repel to magnetic fields (some metallic implants, e.g., contact lenses with metal, dental implants, metallic cranial plates, screws, prashant hole covers, and bone substitute devices). Consult your  physician and  of your implant / other medical device for information on the potential adverse effects of magnetic fields.    BESIDES CPAP, WHAT OTHER THERAPIES ARE THERE?    Positioning Device  Positioning devices are generally used when sleep apnea is mild and only occurs on your back.This example shows a pillow that straps around the waist. It may be appropriate for those whose sleep study shows milder sleep apnea that occurs primarily when lying flat on one's back. Preliminary studies have shown benefit but effectiveness at home may need to be verified by a home sleep test. These devices are generally not covered by medical insurance.  Examples of devices that maintain sleeping on the back to prevent snoring and mild sleep apnea.    Belt type body positioner  http://[employer].Ak?Lex/    Electronic reminder  http://nightshifttherapy.com/            Oral Appliance  What is oral appliance therapy?  An oral appliance device fits on your teeth at night like a retainer used after having braces. The device is made by a specialized dentist and requires several visits over 1-2 months before a manufactured device is made to fit your teeth and is adjusted to prevent your sleep apnea. Once an oral device is working properly, snoring should be improved. A home sleep test may be recommended at that time if to determine whether the sleep apnea is adequately treated.       Some things to remember:  -Oral devices are often, but not always, covered by your medical insurance. Be sure to check with your insurance provider.   -If you are referred for oral therapy, you will be given a list of specialized dentists to consider or you may choose to visit the Web site of the American Academy of Dental Sleep Medicine  -Oral devices are less likely to work if you have severe sleep apnea or are extremely overweight.     More detailed information  An oral appliance is a small acrylic device that fits over the upper and lower teeth   (similar to a retainer or a mouth guard). This device slightly moves jaw forward, which moves the base of the tongue forward, opens the airway, improves breathing for effective treat snoring and obstructive sleep apnea in perhaps 7 out of 10 people .  The best working devices are custom-made by a dental device  after a mold is made of the teeth 1, 2, 3.  When is an oral appliance indicated?  Oral appliance therapy is recommended as a first-line treatment for patients with primary snoring, mild sleep apnea, and for patients with moderate sleep apnea who prefer appliance therapy to use of CPAP4, 5. Severity of sleep apnea is determined by sleep testing and is based on the number of respiratory events per hour of sleep.   How successful is oral appliance therapy?  The success rate of oral appliance therapy in patients with mild sleep apnea is 75-80% while in patients with moderate sleep apnea it is 50-70%. The chance of success in patients with severe sleep apnea is 40-50%. The research also shows that oral appliances have a beneficial effect on the cardiovascular health of BUSHRA patients at the same magnitude as CPAP therapy7.  Oral appliances should be a second-line treatment in cases of severe sleep apnea, but if not completely successful then a combination therapy utilizing CPAP plus oral appliance therapy may be effective. Oral appliances tend to be effective in a broad range of patients although studies show that the patients who have the highest success are females, younger patients, those with milder disease, and less severe obesity. 3, 6.   Finding a dentist that practices dental sleep medicine  Specific training is available through the American Academy of Dental Sleep Medicine for dentists interested in working in the field of sleep. To find a dentist who is educated in the field of sleep and the use of oral appliances, near you, visit the Web site of the American Academy of Dental Sleep  Medicine.    References  1. Olena et al. Objectively measured vs self-reported compliance during oral appliance therapy for sleep-disordered breathing. Chest 2013; 144(5): 5610-2556.  2. Latisha et al. Objective measurement of compliance during oral appliance therapy for sleep-disordered breathing. Thorax 2013; 68(1): 91-96.  3. Rivera et al. Mandibular advancement devices in 620 men and women with BUSHRA and snoring: tolerability and predictors of treatment success. Chest 2004; 125: 7265-3688.  4. Gerard et al. Oral appliances for snoring and BUSHRA: a review. Sleep 2006; 29: 244-262.  5. Regina et al. Oral appliance treatment for BUSHRA: an update. J Clin Sleep Med 2014; 10(2): 215-227.  6. Whitney et al. Predictors of OSAH treatment outcome. J Dent Res 2007; 86: 4192-9484.      Weight Loss: Your Body mass index is 31.78 kg/m .    Being overweight does not necessarily mean you will have health consequences.  Those who have BMI over 35 or over 27 with existing medical conditions carries greater risk.   Weight loss decreases severity of sleep apnea in most people with obesity. For those with mild obesity who have developed snoring with weight gain, even 15-30 pound weight loss can improve and occasionally milder eliminate sleep apnea.  Structured and life-long dietary and health habits are necessary to lose weight and keep healthier weight levels.     The Comprehensive Weight loss program offers all aspects of weight loss strategies including two Non-Surgical Weight Loss Programs: Medical Weight Management and our 24 Week Healthy Lifestyle Program:    Medical Weight Management: You will meet with a Medical Weight Management Provider, as well as a Registered Dietician. The program may include medication therapy, dietary education, recommended exercise and physical therapy programs, monthly support group meetings, and possible psychological counseling. Follow up visits with the provider or dietician are  scheduled based on your progress and needs.    24 Week Healthy Lifestyle Program: This unique program is designed to give you the support of weekly appointments and activities thru a 24-week period. It may include all of the components of the basic program (above), with the addition of 11 individual Health  Visits, 24-week access to the [employer] website for over 700 online classes, and monthly support group meetings. This program has an out-of-pocket expense of $499 to cover the items that can not be billed to insurance (health coaches and [employer] access), and is non-refundable/non-transferable (you may be able to use a Health Savings Account; ask your HSA provider). There may be an optional meal replacement plan prescribed as well.   Surgical management achieves meaningful long-term weight loss and improvement in health risks in most patients with more severe obesity.      Sleep Apnea Surgery:    Surgery for obstructive sleep apnea is considered generally only when other therapies fail to work. Surgery may be discussed with you if you are having a difficult time tolerating CPAP and or when there is an abnormal structure that requires surgical correction.  Nose and throat surgeries often enlarge the airway to prevent collapse.  Most of these surgeries create pain for 1-2 weeks and up to half of the most common surgeries are not effective throughout life.  You should carefully discuss the benefits and drawbacks to surgery with your sleep provider and surgeon to determine if it is the best solution for you.   More information  Surgery for BUSHRA is directed at areas that are responsible for narrowing or complete obstruction of the airway during sleep.  There are a wide range of procedures available to enlarge and/or stabilize the airway to prevent blockage of breathing in the three major areas where it can occur: the palate, tongue, and nasal regions.  Successful surgical treatment depends on the accurate  identification of the factors responsible for obstructive sleep apnea in each person.  A personalized approach is required because there is no single treatment that works well for everyone.  Because of anatomic variation, consultation with an examination by a sleep surgeon is a critical first step in determining what surgical options are best for each patient.  In some cases, examination during sedation may be recommended in order to guide the selection of procedures.  Patients will be counseled about risks and benefits as well as the typical recovery course after surgery. Surgery is typically not a cure for a person s BUSHRA.  However, surgery will often significantly improve one s BUSHRA severity (termed  success rate ).  Even in the absence of a cure, surgery will decrease the cardiovascular risk associated with OSA7; improve overall quality of life8 (sleepiness, functionality, sleep quality, etc).    Palate Procedures:  Patients with BUSHRA often have narrowing of their airway in the region of their tonsils and uvula.  The goals of palate procedures are to widen the airway in this region as well as to help the tissues resist collapse.  Modern palate procedure techniques focus on tissue conservation and soft tissue rearrangement, rather than tissue removal.  Often the uvula is preserved in this procedure. Residual sleep apnea is common in patient after pharyngoplasty with an average reduction in sleep apnea events of 33%2.      Tongue Procedures:  While patients are awake, the muscles that surround the throat are active and keep this region open for breathing. These muscles relax during sleep, allowing the tongue and other structures to collapse and block breathing.  There are several different tongue procedures available.  Selection of a tongue base procedure depends on characteristics seen on physical exam.  Generally, procedures are aimed at removing bulky tissues in this area or preventing the back of the tongue from  falling back during sleep.  Success rates for tongue surgery range from 50-62%3.    Hypoglossal Nerve Stimulation:  Hypoglossal nerve stimulation has recently received approval from the [place] Food and Drug Administration for the treatment of obstructive sleep apnea.  This is based on research showing that the system was safe and effective in treating sleep apnea6.  Results showed that the median AHI score decreased 68%, from 29.3 to 9.0. This therapy uses an implant system that senses breathing patterns and delivers mild stimulation to airway muscles, which keeps the airway open during sleep.  The system consists of three fully implanted components: a small generator (similar in size to a pacemaker), a breathing sensor, and a stimulation lead.  Using a small handheld remote, a patient turns the therapy on before bed and off upon awakening.    Candidates for this device must be greater than 18 years of age, have moderate to severe obstructive sleep apnea with less than 25% central events  (AHI between 15-65), BMI less than 35, have tried CPAP/oral appliance for at least 8 weeks without success, and have appropriate upper airway anatomy (determined by a sleep endoscopy performed by Dr. Fede Baca or Dr. Saturnino Wei).    Nasal Procedures:  Nasal obstruction can interfere with nasal breathing during the day and night.  Studies have shown that relief of nasal obstruction can improve the ability of some patients to tolerate positive airway pressure therapy for obstructive sleep apnea1.  Treatment options include medications such as nasal saline, topical corticosteroid and antihistamine sprays, and oral medications such as antihistamines or decongestants. Non-surgical treatments can include external nasal dilators for selected patients. If these are not successful by themselves, surgery can improve the nasal airway either alone or in combination with these other options.        Combination  Procedures:  Combination of surgical procedures and other treatments may be recommended, particularly if patients have more than one area of narrowing or persistent positional disease.  The success rate of combination surgery ranges from 66-80%2,3.    References  Yariel DOMINGUEZ. The Role of the Nose in Snoring and Obstructive Sleep Apnoea: An Update.  Eur Arch Otorhinolaryngol. 2011; 268: 1365-73.   Amalia SM; Gabbie JA; Maritza JR; Pallanch JF; Eliana MB; Quin SG; Juan POLLARD. Surgical modifications of the upper airway for obstructive sleep apnea in adults: a systematic review and meta-analysis. SLEEP 2010;33(10):5800-1435. Nancy LAW. Hypopharyngeal surgery in obstructive sleep apnea: an evidence-based medicine review.  Arch Otolaryngol Head Neck Surg. 2006 Feb;132(2):206-13.  Oni YH1, Lea Y, Ian KRISTOPHER. The efficacy of anatomically based multilevel surgery for obstructive sleep apnea. Otolaryngol Head Neck Surg. 2003 Oct;129(4):327-35.  Kezirian E, Goldberg A. Hypopharyngeal Surgery in Obstructive Sleep Apnea: An Evidence-Based Medicine Review. Arch Otolaryngol Head Neck Surg. 2006 Feb;132(2):206-13.  Kellie PJ et al. Upper-Airway Stimulation for Obstructive Sleep Apnea.  N Engl J Med. 2014 Jan 9;370(2):139-49.  Josh Y et al. Increased Incidence of Cardiovascular Disease in Middle-aged Men with Obstructive Sleep Apnea. Am J Respir Crit Care Med; 2002 166: 159-165  Pritchett EM et al. Studying Life Effects and Effectiveness of Palatopharyngoplasty (SLEEP) study: Subjective Outcomes of Isolated Uvulopalatopharyngoplasty. Otolaryngol Head Neck Surg. 2011; 144: 623-631.    WHAT IF I ONLY HAVE SNORING?  Mandibular advancement devices, lateral sleep positioning, long-term weight loss and treatment of nasal allergies have been shown to improve snoring.  Exercising tongue muscles with a game (https://apps.[employer]/us/nathan/soundly-reduce-snoring/vj9190950742) or stimulating the tongue during the day with a device  (https://doi.org/10.3390/bpe52255693) have improved snoring in some individuals.  https://www.[employer].com/  https://www.sleepfoundation.org/best-anti-snoring-mouthpieces-and-mouthguards    Remember to Drive Safe... Drive Alive     Sleep health profoundly affects your health, mood, and your safety.  Thirty three percent of the population (one in three of us) is not getting enough sleep and many have a sleep disorder. Not getting enough sleep or having an untreated / undertreated sleep condition may make us sleepy without even knowing it. In fact, our driving could be dramatically impaired due to our sleep health. As your provider, here are some things I would like you to know about driving:     Here are some warning signs for impairment and dangerous drowsy driving:              -Having been awake more than 16 hours               -Looking tired               -Eyelid drooping              -Head nodding (it could be too late at this point)              -Driving for more than 30 minutes     Some things you could do to make the driving safer if you are experiencing some drowsiness:              -Stop driving and rest              -Call for transportation              -Make sure your sleep disorder is adequately treated     Some things that have been shown NOT to work when experiencing drowsiness while driving:              -Turning on the radio              -Opening windows              -Eating any  distracting  /  entertaining  foods (e.g., sunflower seeds, candy, or any other)              -Talking on the phone      Your decision may not only impact your life, but also the life of others. Please, remember to drive safe for yourself and all of us.

## 2024-11-15 NOTE — PROGRESS NOTES
Outpatient Sleep Medicine Consultation:      Name: Anatoliy Boykin MRN# [id number]   Age: 54 year old YOB: 1970     Date of Consultation: November 15, 2024  Consultation is requested by: Nataliia Reed MD  [address]  [place] [address] Nataliia Reed  Primary care provider: Jesse Méndez       Reason for Sleep Consult:     Anatoliy Boykin is sent by Nataliia Reed for a sleep consultation regarding snoring.    Patient s Reason for visit  Anatoliy Boykin main reason for visit: (Patient-Rptd) My snoring has become problematic for my wife.  I also find I'm not generally able to sleep past about 3:00 AM, after a bedtime of about 10:00 PM.  Patient states problem(s) started: (Patient-Rptd) About two years ago - seems to be coincident with weight gain.  Anatoliy Boykin's goals for this visit: (Patient-Rptd) Determine what exactly is going on, and possibly get a CPAP machine if that's the best course of action.  My main hope is just to get some kind of relief for my wife.           Assessment and Plan:     Summary Sleep Diagnoses:  (R06.83) Snoring (primary encounter diagnosis) (R06.81) Witnessed apneic spells (R40.0) Daytime sleepiness (E66.811) Obesity (BMI 30.0-34.9) (G47.00) Insomnia, unspecified type  Comment: Anatoliy is a 54-year-old male who presents to the sleep clinic with symptoms suggestive of obstructive sleep apnea including snoring, witnessed apneic spells, sleep maintenance insomnia, and daytime sleepiness. He is not rested in the morning, and he is sleepy throughout the day. Occasional snort/gasp arousals. He has been snoring over the last 2-3 years. This coincides with weight gain of approximately 30 lbs. Anatoliy is able to fall asleep within 20 minutes, but he wakes 5+ times in the night primarily for uncertain reasons. He has difficulty falling back asleep. He takes a purposeful nap 5 times per week for about 1 hour. Rare inadvertent dozing.  Occasional morning headaches that seem to resolve pretty quickly. His STOP-BANG is 6/8- snoring, tired, observed apnea, age > 50 (54), neck circumference > 40 cm (45 cm), and male gender. Of note, Anatoliy received some devastating news right before the start of today's visit. Today's visit was kept concise.     Plan: HST-Home Sleep Apnea Test - Noxturnal Returnable  Anatoliy is at high risk for obstructive sleep apnea. We reviewed treatment options for obstructive sleep apnea including PAP therapy, mandibular advancement device, positional therapy, and weight management. Patient would be interested in trying CPAP depending on the results of his sleep study. Anatoliy will call central scheduling to schedule his sleep study. Encouraged him to send me a [employer] message if he has any questions.     Comorbid Diagnoses:  Obesity, LOWELL    Summary Recommendations:  Orders Placed This Encounter   Procedures    HST-Home Sleep Apnea Test - Noxturnal Returnable     Summary Counseling:    Sleep Testing Reviewed  Obstructive Sleep Apnea Reviewed  Complications of Untreated Sleep Apnea Reviewed    Patient will follow up approximately 3 months after sleep study. Results of the study will be reviewed via [employer] and treatment will be initiated prior to the follow up visit.   JAIMIE Hearn CNP    Total time spent reviewing medical records, history and physical examination, review of previous testing and interpretation as well as documentation on this date: 25 minutes     CC: Nataliia Reed MD          History of Present Illness:     Anatoliy presents to the sleep clinic with symptoms suggestive of obstructive sleep apnea including snoring, witnessed apneic spells, sleep maintenance insomnia, and daytime sleepiness. He is not rested in the morning, and he is sleepy throughout the day. Occasional snort/gasp arousals. He has been snoring over the last 2-3 years.      He has gained about 30 lbs over the last two years.     Past  Sleep Evaluations: None    SLEEP-WAKE SCHEDULE:     Work/School Days: Patient goes to school/work: (Patient-Rptd) Yes   Usually gets into bed at (Patient-Rptd) 10:00 PM  Takes patient about (Patient-Rptd) Less than 20 minutes to fall asleep  Has trouble falling asleep (Patient-Rptd) Maybe one nights per week  Wakes up in the middle of the night (Patient-Rptd) Probably five times or more  Wakes up due to (Patient-Rptd) Snorting self awake;Use the bathroom;Anxiety;Nightmares bathroom x1, waking for uncertain reasons   He has trouble falling back asleep (Patient-Rptd) Most nights - five or six per week times a week.   It usually takes (Patient-Rptd) One hour to get back to sleep  Patient is usually up at (Patient-Rptd) 6:45 AM  Uses alarm: (Patient-Rptd) Yes    Weekends/Non-work Days/All Other Days:  Usually gets into bed at (Patient-Rptd) 10:00 PM   Takes patient about (Patient-Rptd) About twenty minutes to fall asleep  Patient is usually up at (Patient-Rptd) 6:45 AM  Uses alarm: (Patient-Rptd) Yes    Sleep Need  Patient gets (Patient-Rptd) About five hours sleep on average   Patient thinks he needs about (Patient-Rptd) Six to seven hours sleep    Anatloiy JASON Boykin prefers to sleep in this position(s): (Patient-Rptd) Side   Patient states they do the following activities in bed: (Patient-Rptd) Use phone, computer, or tablet    Naps  Patient takes a purposeful nap (Patient-Rptd) Five days per week and naps are usually (Patient-Rptd) Typically one hour in duration. He naps any chance he can get.   He feels better after a nap: (Patient-Rptd) No  He dozes off unintentionally (Patient-Rptd) Two days days per week. He typically does not inadvertently doze.   Patient has had a driving accident or near-miss due to sleepiness/drowsiness: (Patient-Rptd) No    SLEEP DISRUPTIONS:    Breathing/Snoring  Patient snores: (Patient-Rptd) Yes  Other people complain about his snoring: (Patient-Rptd) Yes  Patient has been told he stops  breathing in his sleep: (Patient-Rptd) Yes  He has issues with the following: (Patient-Rptd) Morning mouth dryness Occasional morning headaches that seem to resolve pretty quickly.     Movement:  Patient gets pain, discomfort, with an urge to move: (Patient-Rptd) Yes Denies restless legs and pain at night, but he does toss and turn a lot.   It happens when he is resting: (Patient-Rptd) Yes  It happens more at night: (Patient-Rptd) Yes  Patient has been told he kicks his legs at night: (Patient-Rptd) No     Behaviors in Sleep:  Anatoliy Boykin has experienced the following behaviors while sleeping: (Patient-Rptd) Sleep-talking;Night terrors (screaming,yelling or acting afraid but not recalling event);See or hear things that are not really there upon awakening or just falling asleep He has vivid nightmares that are not related to past trauma.   He has experienced sudden muscle weakness during the day: (Patient-Rptd) No  Pt denies bruxism, sleep walking, and dream enactment behavior. Pt denies sleep paralysis and cataplexy.    Is there anything else you would like your sleep provider to know:      CAFFEINE AND OTHER SUBSTANCES:    Patient consumes caffeinated beverages per day: (Patient-Rptd) Typically a bottle of Mountain Dew three times per week  Last caffeine use is usually: (Patient-Rptd) 7:00 PM  List of any prescribed or over the counter stimulants that patient takes: (Patient-Rptd) None  List of any prescribed or over the counter sleep medication patient takes: (Patient-Rptd) None  List of previous sleep medications that patient has tried: (Patient-Rptd) Tylenol PM  Patient drinks alcohol to help them sleep: (Patient-Rptd) Yes  Patient drinks alcohol near bedtime: (Patient-Rptd) Yes    Family History:  Patient has a family member been diagnosed with a sleep disorder: (Patient-Rptd) No      Social History: He lives at home with his wife.     SCALES:    EPWORTH SLEEPINESS SCALE         11/14/2024     6:06 PM     [place] Sleepiness Scale ( SARAH Barrera  9530-9906<br>ESS - [place]/English - Final version - 21 Nov 07 - [facility] [place].)   Sitting and reading Moderate chance of dozing   Watching TV Moderate chance of dozing   Sitting, inactive in a public place (e.g. a theatre or a meeting) Would never doze   As a passenger in a car for an hour without a break Slight chance of dozing   Lying down to rest in the afternoon when circumstances permit Moderate chance of dozing   Sitting and talking to someone Would never doze   Sitting quietly after a lunch without alcohol Slight chance of dozing   In a car, while stopped for a few minutes in traffic Would never doze   [place] Score (MC) 8   [place] Score (Sleep) 8        Patient-reported         INSOMNIA SEVERITY INDEX (GRETEL)          11/14/2024     5:38 PM   Insomnia Severity Index (GRETEL)   Difficulty falling asleep 1    Difficulty staying asleep 2    Problems waking up too early 3    How SATISFIED/DISSATISFIED are you with your CURRENT sleep pattern? 3    How NOTICEABLE to others do you think your sleep problem is in terms of impairing the quality of your life? 4    How WORRIED/DISTRESSED are you about your current sleep problem? 4    To what extent do you consider your sleep problem to INTERFERE with your daily functioning (e.g. daytime fatigue, mood, ability to function at work/daily chores, concentration, memory, mood, etc.) CURRENTLY? 3    GRETEL Total Score 20        Patient-reported       Guidelines for Scoring/Interpretation:  Total score categories:  0-7 = No clinically significant insomnia   8-14 = Subthreshold insomnia   15-21 = Clinical insomnia (moderate severity)  22-28 = Clinical insomnia (severe)  Used via courtesy of www.myhealth.va.gov with permission from Dedrick Verdin PhD., [facility]      STOP BANG         11/15/2024     7:00 AM   STOP BANG Questionnaire (  2008, the American Society of Anesthesiologists, Inc. [employer]  "  Neck Cir (cm) Clinic: 45 cm   B/P Clinic: 149/97   BMI Clinic: 31.78         GAD7        10/30/2017     3:42 PM   LOWELL-7    1. Feeling nervous, anxious, or on edge 1   2. Not being able to stop or control worrying 0   3. Worrying too much about different things 1   4. Trouble relaxing 1   5. Being so restless that it is hard to sit still 0   6. Becoming easily annoyed or irritable 0   7. Feeling afraid, as if something awful might happen 0   LOWELL-7 Total Score 3         CAGE-AID         No data to display                CAGE-AID reprinted with permission from the [place] Medical Journal, TOMER See. and THEA Garvin, \"Conjoint screening questionnaires for alcohol and drug abuse\" [place] Medical Journal 94: 135-140, 1995.      PATIENT HEALTH QUESTIONNAIRE-9 (PHQ - 9)        10/30/2017     3:42 PM   PHQ-9 ([employer])   1.  Little interest or pleasure in doing things 1   2.  Feeling down, depressed, or hopeless 1   3.  Trouble falling or staying asleep, or sleeping too much 2   4.  Feeling tired or having little energy 1   5.  Poor appetite or overeating 0   6.  Feeling bad about yourself - or that you are a failure or have let yourself or your family down 1   7.  Trouble concentrating on things, such as reading the newspaper or watching television 1   8.  Moving or speaking so slowly that other people could have noticed. Or the opposite - being so fidgety or restless that you have been moving around a lot more than usual 0   9.  Thoughts that you would be better off dead, or of hurting yourself in some way 0   PHQ-9 Total Score 7   If you checked off any problems, how difficult have these problems made it for you to do your work, take care of things at home, or get along with other people? Somewhat difficult   6.  Feeling bad about yourself 1   7.  Trouble concentrating 1   8.  Moving slowly or restless 0   9.  Suicidal or self-harm thoughts 0   Difficulty at work, home, or with people Somewhat difficult "       Developed by Sangeeta Maxwell, Rekha Pires, Troy Carrero and colleagues, with an educational tomas from [employer] No permission required to reproduce, translate, display or distribute.        Allergies:    Allergies   Allergen Reactions    Pcn [Penicillins]      As child, pt does not know reaction       Medications:    No current outpatient medications on file.       Problem List:  Patient Active Problem List    Diagnosis Date Noted    Performance anxiety 11/06/2012     Priority: Medium    CARDIOVASCULAR SCREENING; LDL GOAL LESS THAN 160 12/03/2010     Priority: Medium    Generalized anxiety disorder 11/18/2010     Priority: Medium     Diagnosis updated by automated process. Provider to review and confirm.          Past Medical/Surgical History:  Past Medical History:   Diagnosis Date    social anxiety disorder 1998     History reviewed. No pertinent surgical history.    Social History:  Social History     Socioeconomic History    Marital status: Single     Spouse name: Not on file    Number of children: 0    Years of education: Not on file    Highest education level: Not on file   Occupational History    Occupation:      Employer: [employer]   Tobacco Use    Smoking status: Never    Smokeless tobacco: Never   Vaping Use    Vaping status: Never Used   Substance and Sexual Activity    Alcohol use: Yes     Comment: 2+ drinks weekly    Drug use: No    Sexual activity: Yes     Partners: Female   Other Topics Concern    Parent/sibling w/ CABG, MI or angioplasty before 65F 55M? No   Social History Narrative    Not on file     Social Drivers of Health     Financial Resource Strain: Low Risk  (8/2/2024)    Financial Resource Strain     Within the past 12 months, have you or your family members you live with been unable to get utilities (heat, electricity) when it was really needed?: No   Food Insecurity: Low Risk  (8/2/2024)    Food Insecurity     Within the past 12  months, did you worry that your food would run out before you got money to buy more?: No     Within the past 12 months, did the food you bought just not last and you didn t have money to get more?: No   Transportation Needs: Low Risk  (2024)    Transportation Needs     Within the past 12 months, has lack of transportation kept you from medical appointments, getting your medicines, non-medical meetings or appointments, work, or from getting things that you need?: No   Physical Activity: Not on file   Stress: Not on file   Social Connections: Not on file   Interpersonal Safety: Low Risk  (2024)    Interpersonal Safety     Do you feel physically and emotionally safe where you currently live?: Yes     Within the past 12 months, have you been hit, slapped, kicked or otherwise physically hurt by someone?: No     Within the past 12 months, have you been humiliated or emotionally abused in other ways by your partner or ex-partner?: No   Housing Stability: Low Risk  (2024)    Housing Stability     Do you have housing? : Yes     Are you worried about losing your housing?: No       Family History:  Family History   Problem Relation Age of Onset    Hypertension Mother     EYE* Mother         Cataracts     Hypertension Father     EYE* Father         Cataracts     Arrhythmia Father     Cancer Maternal Grandmother     Colon Cancer Maternal Grandmother          at 66    Hypertension Maternal Uncle     Diabetes Maternal Uncle     Myocardial Infarction Maternal Grandfather          at 83    Arrhythmia Maternal Grandfather     Hypertension Maternal Grandfather     Myocardial Infarction Paternal Grandmother          72    Kidney Disease Paternal Grandfather     Cancer Paternal Uncle     Cancer Paternal Aunt        Review of Systems:  A complete review of systems reviewed by me is negative with the exeption of what has been mentioned in the history of present illness.  In the last TWO WEEKS have you experienced  "any of the following symptoms?  Fevers: (Patient-Rptd) No  Night Sweats: (Patient-Rptd) No  Weight Gain: (Patient-Rptd) Yes  Pain at Night: (Patient-Rptd) Yes  Double Vision: (Patient-Rptd) No  Changes in Vision: (Patient-Rptd) No  Difficulty Breathing through Nose: (Patient-Rptd) No  Sore Throat in Morning: (Patient-Rptd) Yes  Dry Mouth in the Morning: (Patient-Rptd) Yes  Shortness of Breath Lying Flat: (Patient-Rptd) No  Shortness of Breath With Activity: (Patient-Rptd) No  Awakening with Shortness of Breath: (Patient-Rptd) No  Increased Cough: (Patient-Rptd) Yes  Heart Racing at Night: (Patient-Rptd) No  Swelling in Feet or Legs: (Patient-Rptd) No  Diarrhea at Night: (Patient-Rptd) No  Heartburn at Night: (Patient-Rptd) No  Urinating More than Once at Night: (Patient-Rptd) No  Losing Control of Urine at Night: (Patient-Rptd) No  Joint Pains at Night: (Patient-Rptd) No  Headaches in Morning: (Patient-Rptd) No  Weakness in Arms or Legs: (Patient-Rptd) No  Depressed Mood: (Patient-Rptd) Yes  Anxiety: (Patient-Rptd) Yes     Physical Examination:  Vitals: BP (!) 149/97   Pulse 98   Ht 1.84 m (6' 0.44\")   Wt 107.6 kg (237 lb 3.2 oz)   SpO2 95%   BMI 31.78 kg/m    BMI= Body mass index is 31.78 kg/m .    Neck Cir (cm): 45 cm    GENERAL APPEARANCE: healthy, alert, and tearful  EYES: Eyes grossly normal to inspection, PERRL, and conjunctivae and sclerae normal  NECK: no asymmetry, masses, or scars  RESP: no increased work of breathing noted, no audible cough or wheeze   NEURO: mentation intact and speech normal  PSYCH: mentation appears normal and tearful         Data: All pertinent previous laboratory data reviewed     No results for input(s): \"NA\", \"POTASSIUM\", \"CHLORIDE\", \"CO2\", \"ANIONGAP\", \"GLC\", \"BUN\", \"CR\", \"EVELIA\" in the last 14140 hours.    No results for input(s): \"WBC\", \"RBC\", \"HGB\", \"HCT\", \"MCV\", \"MCH\", \"MCHC\", \"RDW\", \"PLT\" in the last 75682 hours.    No results for input(s): \"PROTTOTAL\", \"ALBUMIN\", " "\"BILITOTAL\", \"ALKPHOS\", \"AST\", \"ALT\", \"BILIDIRECT\" in the last 92882 hours.    No results found for: \"TSH\"    No results found for: \"UAMP\", \"UBARB\", \"BENZODIAZEUR\", \"UCANN\", \"UCOC\", \"OPIT\", \"UPCP\"    No results found for: \"IRONSAT\", \"AV37781\", \"JENNIFER\"    No results found for: \"PH\", \"PHARTERIAL\", \"PO2\", \"KU5QFMORNQF\", \"SAT\", \"PCO2\", \"HCO3\", \"BASEEXCESS\", \"DARIUSZ\", \"BEB\"    @LABRCNTIPR(phv:4,pco2v:4,po2v:4,hco3v:4,casie:4,o2per:4)@    Echocardiology: No results found for this or any previous visit (from the past 4320 hours).    Chest x-ray:   No results found for this or any previous visit from the past 365 days.      Chest CT:   No results found for this or any previous visit from the past 365 days.      PFT: Most Recent [facility]ze Pulmonary Function Testing    Giuseppe Giles, JAIMIE CNP 11/15/2024   "

## 2024-11-15 NOTE — NURSING NOTE
"Chief Complaint   Patient presents with    Sleep Problem     Snoring, trouble sleeping past 3 am, stops breathing       Initial BP (!) 149/97   Pulse 98   Ht 1.84 m (6' 0.44\")   Wt 107.6 kg (237 lb 3.2 oz)   SpO2 95%   BMI 31.78 kg/m   Estimated body mass index is 31.78 kg/m  as calculated from the following:    Height as of this encounter: 1.84 m (6' 0.44\").    Weight as of this encounter: 107.6 kg (237 lb 3.2 oz).    Medication Reconciliation: complete  ESS 8  Neck circumference: 45 centimeters.        Cornell Pires MA   "

## 2025-03-16 ENCOUNTER — OFFICE VISIT (OUTPATIENT)
Dept: URGENT CARE | Facility: URGENT CARE | Age: 55
End: 2025-03-16
Payer: COMMERCIAL

## 2025-03-16 VITALS
OXYGEN SATURATION: 96 % | RESPIRATION RATE: 20 BRPM | HEART RATE: 107 BPM | DIASTOLIC BLOOD PRESSURE: 90 MMHG | SYSTOLIC BLOOD PRESSURE: 122 MMHG | TEMPERATURE: 97.2 F | BODY MASS INDEX: 30.23 KG/M2 | WEIGHT: 225.6 LBS

## 2025-03-16 DIAGNOSIS — F10.29 ALCOHOL DEPENDENCE WITH UNSPECIFIED ALCOHOL-INDUCED DISORDER (H): ICD-10-CM

## 2025-03-16 DIAGNOSIS — K21.00 GASTROESOPHAGEAL REFLUX DISEASE WITH ESOPHAGITIS WITHOUT HEMORRHAGE: Primary | ICD-10-CM

## 2025-03-16 DIAGNOSIS — F43.0 ACUTE REACTION TO STRESS: ICD-10-CM

## 2025-03-16 LAB
ALBUMIN SERPL-MCNC: 4.3 G/DL (ref 3.4–5)
ALP SERPL-CCNC: 120 U/L (ref 40–150)
ALT SERPL W P-5'-P-CCNC: 273 U/L (ref 0–70)
ANION GAP SERPL CALCULATED.3IONS-SCNC: 15 MMOL/L (ref 3–14)
AST SERPL W P-5'-P-CCNC: 474 U/L (ref 0–45)
BASOPHILS # BLD AUTO: 0.1 10E3/UL (ref 0–0.2)
BASOPHILS NFR BLD AUTO: 1 %
BILIRUB SERPL-MCNC: 3 MG/DL (ref 0.2–1.3)
BUN SERPL-MCNC: 8 MG/DL (ref 7–30)
CALCIUM SERPL-MCNC: 10.2 MG/DL (ref 8.5–10.1)
CHLORIDE BLD-SCNC: 95 MMOL/L (ref 94–109)
CO2 SERPL-SCNC: 27 MMOL/L (ref 20–32)
CREAT SERPL-MCNC: 0.8 MG/DL (ref 0.66–1.25)
EGFRCR SERPLBLD CKD-EPI 2021: >90 ML/MIN/1.73M2
EOSINOPHIL # BLD AUTO: 0 10E3/UL (ref 0–0.7)
EOSINOPHIL NFR BLD AUTO: 0 %
ERYTHROCYTE [DISTWIDTH] IN BLOOD BY AUTOMATED COUNT: 12.5 % (ref 10–15)
GLUCOSE BLD-MCNC: 93 MG/DL (ref 70–99)
HCT VFR BLD AUTO: 42.7 % (ref 40–53)
HGB BLD-MCNC: 15.7 G/DL (ref 13.3–17.7)
IMM GRANULOCYTES # BLD: 0.1 10E3/UL
IMM GRANULOCYTES NFR BLD: 1 %
LIPASE SERPL-CCNC: 94 U/L (ref 13–60)
LYMPHOCYTES # BLD AUTO: 0.8 10E3/UL (ref 0.8–5.3)
LYMPHOCYTES NFR BLD AUTO: 9 %
MCH RBC QN AUTO: 35.8 PG (ref 26.5–33)
MCHC RBC AUTO-ENTMCNC: 36.8 G/DL (ref 31.5–36.5)
MCV RBC AUTO: 98 FL (ref 78–100)
MONOCYTES # BLD AUTO: 0.6 10E3/UL (ref 0–1.3)
MONOCYTES NFR BLD AUTO: 8 %
NEUTROPHILS # BLD AUTO: 6.8 10E3/UL (ref 1.6–8.3)
NEUTROPHILS NFR BLD AUTO: 82 %
PLATELET # BLD AUTO: 116 10E3/UL (ref 150–450)
POTASSIUM BLD-SCNC: 4.9 MMOL/L (ref 3.4–5.3)
PROT SERPL-MCNC: 7.2 G/DL (ref 6.8–8.8)
RBC # BLD AUTO: 4.38 10E6/UL (ref 4.4–5.9)
SODIUM SERPL-SCNC: 137 MMOL/L (ref 135–145)
WBC # BLD AUTO: 8.3 10E3/UL (ref 4–11)

## 2025-03-16 PROCEDURE — 80053 COMPREHEN METABOLIC PANEL: CPT | Performed by: PHYSICIAN ASSISTANT

## 2025-03-16 PROCEDURE — 99214 OFFICE O/P EST MOD 30 MIN: CPT | Performed by: PHYSICIAN ASSISTANT

## 2025-03-16 PROCEDURE — 1126F AMNT PAIN NOTED NONE PRSNT: CPT | Performed by: PHYSICIAN ASSISTANT

## 2025-03-16 PROCEDURE — 83690 ASSAY OF LIPASE: CPT | Performed by: PHYSICIAN ASSISTANT

## 2025-03-16 PROCEDURE — 3080F DIAST BP >= 90 MM HG: CPT | Performed by: PHYSICIAN ASSISTANT

## 2025-03-16 PROCEDURE — 85025 COMPLETE CBC W/AUTO DIFF WBC: CPT | Performed by: PHYSICIAN ASSISTANT

## 2025-03-16 PROCEDURE — 36415 COLL VENOUS BLD VENIPUNCTURE: CPT | Performed by: PHYSICIAN ASSISTANT

## 2025-03-16 PROCEDURE — 3074F SYST BP LT 130 MM HG: CPT | Performed by: PHYSICIAN ASSISTANT

## 2025-03-16 RX ORDER — OMEPRAZOLE 20 MG/1
20 CAPSULE, DELAYED RELEASE ORAL DAILY
Qty: 30 CAPSULE | Refills: 0 | Status: SHIPPED | OUTPATIENT
Start: 2025-03-16 | End: 2025-04-15

## 2025-03-16 ASSESSMENT — PAIN SCALES - GENERAL: PAINLEVEL_OUTOF10: NO PAIN (0)

## 2025-03-16 NOTE — PATIENT INSTRUCTIONS
GERD:  Patient was educated on the natural course of condition likely triggered by alcohol consumption. Acid reflux occurs when the acid un your stomach backs up into the esophagus. Most common symptoms include heartburn, stomach or chest discomfort, and nausea. Conservative measures include eating food slowly, avoiding food 2 hours prior to bed, avoiding reflux triggering foods (spicy foods, coffee, or alcohol), and raising the head of the bed.  See your primary care provider if symptoms worsen or do not improve in 7 days. Seek emergency care if you develop severe abdominal pain, or fever. Patient verbalized understanding and is agreeable to plan. The patient was discharged ambulatory and in stable condition.     Social anxiety:  Patient was educated on anxiety disorder.  Referred to mental health services for social anxiety and alcohol dependence. See your primary care provider if symptoms worsen or do not improve in 7 days. Seek emergency care if you develop suicidal ideation.          [id number]    1. Text  Life  to 25163*.  2. Wait for a trained crisis counselor to respond to your text.  3. Respond and start the conversation about what you are concerned about.  [facility] is a suicide prevention resource for residents in [place].  We can help you with relationship issues, general mental health, and suicide.  We are free, confidential, and here for you 24/7/365.    If I experience the following:  Thoughts of harm towards self or others    Please try the following and call list to get support:    (1) Utilize the support of family members, friends, and community support persons such as your , county , therapist, or sponsor.  (2) Seek help before you act in an unhealthy or harmful manner.   (3) Call Crisis [facility] at [phone]; or 210 if you need to do so.

## 2025-03-16 NOTE — PROGRESS NOTES
URGENT CARE VISIT:    SUBJECTIVE:   Anatoliy Boykin is a 54 year old male presenting with a chief complaint of cough and irritated throat.  Onset was 1 week(s) ago.   He denies the following symptoms: stuffy nose and shortness of breath. He has had loss of appetite.  Course of illness is worsening.    Treatment measures tried include tums and pepto with no relief of symptoms.  Predisposing factors include drinks alcohol daily.    He also has had social anxiety majority of his life which has worsened in the last 6 months due to new position at work that requires more interaction. This has led to increase in alcohol consumption. He denies suicidal ideations.     PMH:   Past Medical History:   Diagnosis Date    social anxiety disorder 1998     Allergies: Pcn [penicillins]   Medications:   Current Outpatient Medications   Medication Sig Dispense Refill    omeprazole (PRILOSEC) 20 MG DR capsule Take 1 capsule (20 mg) by mouth daily. 30 capsule 0     Social History:   Social History     Tobacco Use    Smoking status: Never    Smokeless tobacco: Never   Substance Use Topics    Alcohol use: Yes     Comment: 2+ drinks weekly       ROS:  Review of systems negative except as stated above.    OBJECTIVE:  /90 (BP Location: Left arm, Patient Position: Sitting, Cuff Size: Adult Large)   Pulse 107   Temp 97.2  F (36.2  C) (Tympanic)   Resp 20   Wt 102.3 kg (225 lb 9.6 oz)   SpO2 96%   BMI 30.23 kg/m    GENERAL APPEARANCE: healthy, alert and no distress  EYES: EOMI,  PERRL, conjunctiva clear  HENT: ear canals and TM's normal.  Nose and mouth without ulcers, erythema or lesions  NECK: supple, nontender, no lymphadenopathy  RESP: lungs clear to auscultation - no rales, rhonchi or wheezes  CV: regular rates and rhythm, normal S1 S2, no murmur noted  SKIN: no suspicious lesions or rashes    Labs:    Results for orders placed or performed in visit on 03/16/25   CBC with platelets and differential     Status: Abnormal    Result Value Ref Range    WBC Count 8.3 4.0 - 11.0 10e3/uL    RBC Count 4.38 (L) 4.40 - 5.90 10e6/uL    Hemoglobin 15.7 13.3 - 17.7 g/dL    Hematocrit 42.7 40.0 - 53.0 %    MCV 98 78 - 100 fL    MCH 35.8 (H) 26.5 - 33.0 pg    MCHC 36.8 (H) 31.5 - 36.5 g/dL    RDW 12.5 10.0 - 15.0 %    Platelet Count 116 (L) 150 - 450 10e3/uL    % Neutrophils 82 %    % Lymphocytes 9 %    % Monocytes 8 %    % Eosinophils 0 %    % Basophils 1 %    % Immature Granulocytes 1 %    Absolute Neutrophils 6.8 1.6 - 8.3 10e3/uL    Absolute Lymphocytes 0.8 0.8 - 5.3 10e3/uL    Absolute Monocytes 0.6 0.0 - 1.3 10e3/uL    Absolute Eosinophils 0.0 0.0 - 0.7 10e3/uL    Absolute Basophils 0.1 0.0 - 0.2 10e3/uL    Absolute Immature Granulocytes 0.1 <=0.4 10e3/uL   CBC with platelets and differential     Status: Abnormal    Narrative    The following orders were created for panel order CBC with platelets and differential.  Procedure                               Abnormality         Status                     ---------                               -----------         ------                     CBC with platelets and ...[[id number]]  Abnormal            Final result                 Please view results for these tests on the individual orders.       ASSESSMENT:    ICD-10-CM    1. Gastroesophageal reflux disease with esophagitis without hemorrhage  K21.00 CBC with platelets and differential     Comprehensive metabolic panel     Lipase     omeprazole (PRILOSEC) 20 MG DR capsule     CBC with platelets and differential     Comprehensive metabolic panel     Lipase      2. Acute reaction to stress  F43.0 Adult Mental Health  Referral     Primary Care Referral      3. Alcohol dependence with unspecified alcohol-induced disorder (H)  F10.29 Adult Mental Health  Referral     Adult Mental Health  Referral          PLAN:  30 minutes spent by me on the date of the encounter doing chart review, review of outside records, review of test  results, interpretation of tests, patient visit, and documentation   Patient Instructions   GERD:  Patient was educated on the natural course of condition likely triggered by alcohol consumption. Acid reflux occurs when the acid un your stomach backs up into the esophagus. Most common symptoms include heartburn, stomach or chest discomfort, and nausea. Conservative measures include eating food slowly, avoiding food 2 hours prior to bed, avoiding reflux triggering foods (spicy foods, coffee, or alcohol), and raising the head of the bed.  See your primary care provider if symptoms worsen or do not improve in 7 days. Seek emergency care if you develop severe abdominal pain, or fever. Patient verbalized understanding and is agreeable to plan. The patient was discharged ambulatory and in stable condition.     Social anxiety:  Patient was educated on anxiety disorder.  Referred to mental health services for social anxiety and alcohol dependence. See your primary care provider if symptoms worsen or do not improve in 7 days. Seek emergency care if you develop suicidal ideation.      Patient verbalized understanding and is agreeable to plan. The patient was discharged ambulatory and in stable condition.    Radha Reyes PA-C ....................  3/16/2025   5:01 PM

## 2025-03-17 ENCOUNTER — TELEPHONE (OUTPATIENT)
Dept: INTERNAL MEDICINE | Facility: CLINIC | Age: 55
End: 2025-03-17
Payer: COMMERCIAL

## 2025-03-17 NOTE — TELEPHONE ENCOUNTER
"Patient called requesting lab results from 3/16/25:    Lab message given: \"Please inform patient that his pancreas enzyme and liver enzymes are elevated  This is likely due to alcohol. Advised to continue taking omeprazole for this and have follow up with his PCP     James Hodges, [facility] PA-C\"    "

## 2025-04-11 PROBLEM — R74.8 ELEVATED LIVER ENZYMES: Status: ACTIVE | Noted: 2025-04-11

## 2025-04-11 PROBLEM — F10.91 ALCOHOL USE DISORDER IN REMISSION: Status: ACTIVE | Noted: 2025-04-11

## 2025-04-11 PROBLEM — D69.6 THROMBOCYTOPENIA: Status: ACTIVE | Noted: 2025-04-11

## 2025-04-11 PROBLEM — R03.0 ELEVATED BLOOD PRESSURE READING WITHOUT DIAGNOSIS OF HYPERTENSION: Status: ACTIVE | Noted: 2025-04-11

## 2025-04-18 ENCOUNTER — ANCILLARY PROCEDURE (OUTPATIENT)
Dept: ULTRASOUND IMAGING | Facility: CLINIC | Age: 55
End: 2025-04-18
Attending: STUDENT IN AN ORGANIZED HEALTH CARE EDUCATION/TRAINING PROGRAM
Payer: COMMERCIAL

## 2025-04-18 DIAGNOSIS — D69.6 THROMBOCYTOPENIA: ICD-10-CM

## 2025-04-18 DIAGNOSIS — R74.8 ELEVATED LIVER ENZYMES: ICD-10-CM

## 2025-04-18 DIAGNOSIS — F10.91 ALCOHOL USE DISORDER IN REMISSION: ICD-10-CM

## 2025-04-18 PROCEDURE — 76705 ECHO EXAM OF ABDOMEN: CPT

## 2025-04-21 NOTE — RESULT ENCOUNTER NOTE
Dear Anatoliy,     I have reviewed your liver ultrasound.     The ultrasound does show what is likely diffuse fatty liver without any masses and moderate enlargement of the liver. The GI team will likely want to get more advanced imaging to rule out/assess for liver fibrosis, but they will discuss with this with you in detail at your May appointment. For now, keep up the good work with sobriety! I will see you soon for your follow-up appointment.       Let me know if you have any questions!     Best Regards,   Efrain Posey PA-C  [facility]

## 2025-05-07 ASSESSMENT — ANXIETY QUESTIONNAIRES
GAD7 TOTAL SCORE: 6
GAD7 TOTAL SCORE: 6
6. BECOMING EASILY ANNOYED OR IRRITABLE: SEVERAL DAYS
7. FEELING AFRAID AS IF SOMETHING AWFUL MIGHT HAPPEN: NOT AT ALL
GAD7 TOTAL SCORE: 6
IF YOU CHECKED OFF ANY PROBLEMS ON THIS QUESTIONNAIRE, HOW DIFFICULT HAVE THESE PROBLEMS MADE IT FOR YOU TO DO YOUR WORK, TAKE CARE OF THINGS AT HOME, OR GET ALONG WITH OTHER PEOPLE: SOMEWHAT DIFFICULT
3. WORRYING TOO MUCH ABOUT DIFFERENT THINGS: SEVERAL DAYS
8. IF YOU CHECKED OFF ANY PROBLEMS, HOW DIFFICULT HAVE THESE MADE IT FOR YOU TO DO YOUR WORK, TAKE CARE OF THINGS AT HOME, OR GET ALONG WITH OTHER PEOPLE?: SOMEWHAT DIFFICULT
2. NOT BEING ABLE TO STOP OR CONTROL WORRYING: SEVERAL DAYS
5. BEING SO RESTLESS THAT IT IS HARD TO SIT STILL: SEVERAL DAYS
4. TROUBLE RELAXING: SEVERAL DAYS
7. FEELING AFRAID AS IF SOMETHING AWFUL MIGHT HAPPEN: NOT AT ALL
1. FEELING NERVOUS, ANXIOUS, OR ON EDGE: SEVERAL DAYS

## 2025-05-08 ENCOUNTER — OFFICE VISIT (OUTPATIENT)
Dept: FAMILY MEDICINE | Facility: CLINIC | Age: 55
End: 2025-05-08
Payer: COMMERCIAL

## 2025-05-08 VITALS
OXYGEN SATURATION: 97 % | BODY MASS INDEX: 29.89 KG/M2 | HEIGHT: 72 IN | DIASTOLIC BLOOD PRESSURE: 74 MMHG | RESPIRATION RATE: 20 BRPM | TEMPERATURE: 96.8 F | HEART RATE: 88 BPM | WEIGHT: 220.7 LBS | SYSTOLIC BLOOD PRESSURE: 112 MMHG

## 2025-05-08 DIAGNOSIS — D69.6 THROMBOCYTOPENIA: ICD-10-CM

## 2025-05-08 DIAGNOSIS — E78.2 MIXED HYPERLIPIDEMIA: ICD-10-CM

## 2025-05-08 DIAGNOSIS — F10.91 ALCOHOL USE DISORDER IN REMISSION: Primary | ICD-10-CM

## 2025-05-08 DIAGNOSIS — F41.1 GAD (GENERALIZED ANXIETY DISORDER): ICD-10-CM

## 2025-05-08 DIAGNOSIS — R74.8 ELEVATED LIVER ENZYMES: ICD-10-CM

## 2025-05-08 DIAGNOSIS — F40.10 SOCIAL ANXIETY DISORDER: ICD-10-CM

## 2025-05-08 DIAGNOSIS — F33.1 MODERATE RECURRENT MAJOR DEPRESSION (H): ICD-10-CM

## 2025-05-08 DIAGNOSIS — R03.0 ELEVATED BLOOD PRESSURE READING WITHOUT DIAGNOSIS OF HYPERTENSION: ICD-10-CM

## 2025-05-08 DIAGNOSIS — R16.0 HEPATOMEGALY: ICD-10-CM

## 2025-05-08 RX ORDER — ESCITALOPRAM OXALATE 10 MG/1
10 TABLET ORAL DAILY
Qty: 90 TABLET | Refills: 3 | Status: SHIPPED | OUTPATIENT
Start: 2025-05-08 | End: 2026-05-03

## 2025-05-08 RX ORDER — ROSUVASTATIN CALCIUM 5 MG/1
5 TABLET, COATED ORAL DAILY
Qty: 90 TABLET | Refills: 1 | Status: SHIPPED | OUTPATIENT
Start: 2025-05-08 | End: 2025-11-04

## 2025-05-08 ASSESSMENT — PATIENT HEALTH QUESTIONNAIRE - PHQ9
10. IF YOU CHECKED OFF ANY PROBLEMS, HOW DIFFICULT HAVE THESE PROBLEMS MADE IT FOR YOU TO DO YOUR WORK, TAKE CARE OF THINGS AT HOME, OR GET ALONG WITH OTHER PEOPLE: SOMEWHAT DIFFICULT
SUM OF ALL RESPONSES TO PHQ QUESTIONS 1-9: 6
SUM OF ALL RESPONSES TO PHQ QUESTIONS 1-9: 6

## 2025-05-08 ASSESSMENT — ENCOUNTER SYMPTOMS: NERVOUS/ANXIOUS: 1

## 2025-05-08 ASSESSMENT — PAIN SCALES - GENERAL: PAINLEVEL_OUTOF10: NO PAIN (0)

## 2025-05-08 NOTE — PROGRESS NOTES
"  Assessment & Plan     Alcohol use disorder in remission  Elevated liver enzymes  Hepatomegaly  Thrombocytopenia  Mixed hyperlipidemia  History of heavy drinker (4-5 vodka shots daily for 6-7 years), now sober for last roughly 1 month.  No other substance abuse including no drug or nicotine use. Most recent labs -> Improving but elevated AST, ALT, and direct hyperbilirubinemia, elevated ferritin, and elevated LDL of 205. Normal A1c, ALP, kidney function, hepatitis labs, INR, and aPTT. Liver ultrasound showed \"Diffusely echogenic hepatic parenchyma without discrete lesion. Moderate hepatomegaly\". He continues to do well with alcohol cessation although does crave from time to time. No abdominal pain, jaundice, vomiting, stool change, or fever. He is scheduled to see the hepatology team later this month.   Plan: Labs trending in right direction but important to keep hepatology appointment for further evaluation - like with fibroscan/other liver imaging. Continue with alcohol cessation. I will talk to the hepatologist to see if starting gabapentin or naltrexone would be ok given liver to help with alcohol craving. Otherwise, start crestor 5mg daily at bedtime (smaller dose given liver), recheck lipid and liver panel in 3 months. I will follow-up with him after I hear back from the hepatology team.     - rosuvastatin (CRESTOR) 5 MG tablet; Take 1 tablet (5 mg) by mouth daily.  - Lipid Profile; Future  - Hepatic panel (Albumin, ALT, AST, Bili, Alk Phos, TP); Future    Elevated blood pressure reading without diagnosis of hypertension  Previously elevated blood pressure but today it is WNL at 112/74. Possibly lower due to 1+ month of alcohol cessation.  Plan: Will continue to monitor at future appointments.     Social anxiety disorder  LOWELL (generalized anxiety disorder)  Moderate recurrent major depression (H)  After starting lexapro, he has noticed significant improvement in anxiety and depression symptoms. He \"can feel a " "change that is hard to characterize but more steady and edge is taken off\". PHQ-9: 15 (4/11/25) -> 6 (today). LOWELL-7: 11 (4/11/25) -> 6 (today)  Plan: Continue lexapro 10mg daily - refill x1 year given. Follow-up in 3 months.     - escitalopram (LEXAPRO) 10 MG tablet; Take 1 tablet (10 mg) by mouth daily.        The longitudinal plan of care for the diagnosis(es)/condition(s) as documented were addressed during this visit. Due to the added complexity in care, I will continue to support Anatoliy in the subsequent management and with ongoing continuity of care.      Subjective   Anatoliy is a 54 year old, presenting for the following health issues:  Recheck Medication (Discuss about Lexapro ), Blood Pressure Check, Anxiety, and Depression        5/8/2025     2:57 PM   Additional Questions   Roomed by Roel Obrien   Accompanied by Self     Anxiety    History of Present Illness       Mental Health Follow-up:  Patient presents to follow-up on Depression & Anxiety.Patient's depression since last visit has been:  Better  The patient is not having other symptoms associated with depression.  Patient's anxiety since last visit has been:  Better  The patient is not having other symptoms associated with anxiety.  Any significant life events: No  Patient is not feeling anxious or having panic attacks.  Patient has concerns about alcohol or drug use.    Hyperlipidemia:  He presents for follow up of hyperlipidemia.   He is not taking medication to lower cholesterol. He is not having myalgia or other side effects to statin medications.    Hypertension: He presents for follow up of hypertension.  He does check blood pressure  regularly outside of the clinic. Outside blood pressures have been over 140/90. He does not follow a low salt diet.     He eats 2-3 servings of fruits and vegetables daily.He consumes 1 sweetened beverage(s) daily.He exercises with enough effort to increase his heart rate 20 to 29 minutes per day.  He exercises with " "enough effort to increase his heart rate 5 days per week. He is missing 1 dose(s) of medications per week.  He is not taking prescribed medications regularly due to remembering to take.                        Objective    /74 (BP Location: Right arm, Patient Position: Sitting, Cuff Size: Adult Large)   Pulse 88   Temp 96.8  F (36  C) (Temporal)   Resp 20   Ht 1.836 m (6' 0.3\")   Wt 100.1 kg (220 lb 11.2 oz)   SpO2 97%   BMI 29.68 kg/m    Body mass index is 29.68 kg/m .  Physical Exam   GENERAL: healthy, alert and no distress  EYES: Eyes grossly normal to inspection, EOM intact and conjunctivae normal  RESP: breathing comfortably on room air  PSYCH: mentation appears normal, affect normal/bright              Signed Electronically by: Efrain Posey PA-C    "

## 2025-05-13 ENCOUNTER — DOCUMENTATION ONLY (OUTPATIENT)
Dept: LAB | Facility: CLINIC | Age: 55
End: 2025-05-13
Payer: COMMERCIAL

## 2025-05-13 DIAGNOSIS — R79.89 ABNORMAL LFTS: Primary | ICD-10-CM

## 2025-05-13 NOTE — PROGRESS NOTES
Anatoliy Boykin has an upcoming lab appointment:    Future Appointments   Date Time Provider Department Center   5/23/2025  4:15 PM [facility]   5/28/2025  2:00 PM Miriam Dominguez PA-C [facility]   5/29/2025 12:30 PM Peri Bauer PA-C [facility]   8/11/2025  3:45 PM [facility]   8/14/2025  4:10 PM Efrain Posey PA-C [facility]     Patient is scheduled for labs    There is no order available. Please review and place either future orders or HMPO (Review of Health Maintenance Protocol Orders), as appropriate.    There are no preventive care reminders to display for this patient.  Aby Bartlett

## 2025-05-22 ENCOUNTER — RESULTS FOLLOW-UP (OUTPATIENT)
Dept: PEDIATRICS | Facility: CLINIC | Age: 55
End: 2025-05-22

## 2025-05-22 ENCOUNTER — OFFICE VISIT (OUTPATIENT)
Dept: URGENT CARE | Facility: URGENT CARE | Age: 55
End: 2025-05-22
Payer: COMMERCIAL

## 2025-05-22 ENCOUNTER — OFFICE VISIT (OUTPATIENT)
Dept: PEDIATRICS | Facility: CLINIC | Age: 55
End: 2025-05-22
Payer: COMMERCIAL

## 2025-05-22 VITALS
BODY MASS INDEX: 28.89 KG/M2 | DIASTOLIC BLOOD PRESSURE: 90 MMHG | HEART RATE: 92 BPM | OXYGEN SATURATION: 99 % | RESPIRATION RATE: 20 BRPM | WEIGHT: 213 LBS | SYSTOLIC BLOOD PRESSURE: 144 MMHG | TEMPERATURE: 101.5 F

## 2025-05-22 VITALS
TEMPERATURE: 100.3 F | SYSTOLIC BLOOD PRESSURE: 122 MMHG | HEIGHT: 72 IN | WEIGHT: 212 LBS | DIASTOLIC BLOOD PRESSURE: 90 MMHG | RESPIRATION RATE: 22 BRPM | BODY MASS INDEX: 28.71 KG/M2 | OXYGEN SATURATION: 98 % | HEART RATE: 111 BPM

## 2025-05-22 DIAGNOSIS — R19.7 NAUSEA VOMITING AND DIARRHEA: ICD-10-CM

## 2025-05-22 DIAGNOSIS — R11.2 NAUSEA AND VOMITING, UNSPECIFIED VOMITING TYPE: Primary | ICD-10-CM

## 2025-05-22 DIAGNOSIS — D69.6 THROMBOCYTOPENIA: ICD-10-CM

## 2025-05-22 DIAGNOSIS — R10.32 LLQ ABDOMINAL PAIN: ICD-10-CM

## 2025-05-22 DIAGNOSIS — R50.9 FEVER, UNSPECIFIED FEVER CAUSE: ICD-10-CM

## 2025-05-22 DIAGNOSIS — K76.0 HEPATIC STEATOSIS: ICD-10-CM

## 2025-05-22 DIAGNOSIS — R10.11 RUQ ABDOMINAL PAIN: ICD-10-CM

## 2025-05-22 DIAGNOSIS — R50.9 FEVER IN ADULT: ICD-10-CM

## 2025-05-22 DIAGNOSIS — E87.6 HYPOKALEMIA: ICD-10-CM

## 2025-05-22 DIAGNOSIS — R11.2 NAUSEA VOMITING AND DIARRHEA: ICD-10-CM

## 2025-05-22 DIAGNOSIS — E87.1 HYPONATREMIA: ICD-10-CM

## 2025-05-22 DIAGNOSIS — R10.84 ABDOMINAL PAIN, GENERALIZED: Primary | ICD-10-CM

## 2025-05-22 DIAGNOSIS — R16.1 SPLENOMEGALY: ICD-10-CM

## 2025-05-22 DIAGNOSIS — K52.9 GASTROENTERITIS: ICD-10-CM

## 2025-05-22 DIAGNOSIS — K52.9 ENTEROCOLITIS: ICD-10-CM

## 2025-05-22 LAB
ALBUMIN SERPL-MCNC: 4 G/DL (ref 3.4–5)
ALBUMIN UR-MCNC: >=300 MG/DL
ALP SERPL-CCNC: 69 U/L (ref 40–150)
ALT SERPL W P-5'-P-CCNC: 21 U/L (ref 0–70)
ANION GAP SERPL CALCULATED.3IONS-SCNC: 17 MMOL/L (ref 3–14)
APPEARANCE UR: CLEAR
AST SERPL W P-5'-P-CCNC: 46 U/L (ref 0–45)
BACTERIA #/AREA URNS HPF: ABNORMAL /HPF
BASOPHILS # BLD AUTO: 0 10E3/UL (ref 0–0.2)
BASOPHILS NFR BLD AUTO: 0 %
BILIRUB SERPL-MCNC: 1.5 MG/DL (ref 0.2–1.3)
BILIRUB UR QL STRIP: ABNORMAL
BUN SERPL-MCNC: 14 MG/DL (ref 7–30)
CALCIUM SERPL-MCNC: 9.6 MG/DL (ref 8.5–10.1)
CHLORIDE BLD-SCNC: 89 MMOL/L (ref 94–109)
CHOLEST SERPL-MCNC: 169 MG/DL
CO2 SERPL-SCNC: 24 MMOL/L (ref 20–32)
COLOR UR AUTO: YELLOW
CREAT SERPL-MCNC: 1 MG/DL (ref 0.66–1.25)
EGFRCR SERPLBLD CKD-EPI 2021: >90 ML/MIN/1.73M2
EOSINOPHIL # BLD AUTO: 0 10E3/UL (ref 0–0.7)
EOSINOPHIL NFR BLD AUTO: 0 %
ERYTHROCYTE [DISTWIDTH] IN BLOOD BY AUTOMATED COUNT: 15.9 % (ref 10–15)
GLUCOSE BLD-MCNC: 113 MG/DL (ref 70–99)
GLUCOSE UR STRIP-MCNC: NEGATIVE MG/DL
HCT VFR BLD AUTO: 37.9 % (ref 40–53)
HDLC SERPL-MCNC: 77 MG/DL
HGB BLD-MCNC: 13.7 G/DL (ref 13.3–17.7)
HGB UR QL STRIP: NEGATIVE
IMM GRANULOCYTES # BLD: 0 10E3/UL
IMM GRANULOCYTES NFR BLD: 1 %
INR PPP: 1.18 (ref 0.85–1.15)
KETONES UR STRIP-MCNC: 80 MG/DL
LDLC SERPL CALC-MCNC: 72 MG/DL
LEUKOCYTE ESTERASE UR QL STRIP: NEGATIVE
LIPASE SERPL-CCNC: 34 U/L (ref 13–60)
LYMPHOCYTES # BLD AUTO: 0.5 10E3/UL (ref 0.8–5.3)
LYMPHOCYTES NFR BLD AUTO: 9 %
MCH RBC QN AUTO: 33.1 PG (ref 26.5–33)
MCHC RBC AUTO-ENTMCNC: 36.1 G/DL (ref 31.5–36.5)
MCV RBC AUTO: 92 FL (ref 78–100)
MONOCYTES # BLD AUTO: 0.1 10E3/UL (ref 0–1.3)
MONOCYTES NFR BLD AUTO: 3 %
MUCOUS THREADS #/AREA URNS LPF: PRESENT /LPF
NEUTROPHILS # BLD AUTO: 4.9 10E3/UL (ref 1.6–8.3)
NEUTROPHILS NFR BLD AUTO: 88 %
NITRATE UR QL: POSITIVE
NONHDLC SERPL-MCNC: 92 MG/DL
NRBC # BLD AUTO: 0 10E3/UL
NRBC BLD AUTO-RTO: 0 /100
PH UR STRIP: 6 [PH] (ref 5–7)
PLAT MORPH BLD: ABNORMAL
PLATELET # BLD AUTO: 68 10E3/UL (ref 150–450)
POLYCHROMASIA BLD QL SMEAR: SLIGHT
POTASSIUM BLD-SCNC: 2.7 MMOL/L (ref 3.4–5.3)
PROT SERPL-MCNC: 6.8 G/DL (ref 6.8–8.8)
PROTHROMBIN TIME: 14.7 SECONDS (ref 11.8–14.8)
RBC # BLD AUTO: 4.14 10E6/UL (ref 4.4–5.9)
RBC #/AREA URNS AUTO: ABNORMAL /HPF
RBC MORPH BLD: ABNORMAL
SODIUM SERPL-SCNC: 130 MMOL/L (ref 135–145)
SP GR UR STRIP: >=1.03 (ref 1–1.03)
SPHEROCYTES BLD QL SMEAR: SLIGHT
SQUAMOUS #/AREA URNS AUTO: ABNORMAL /LPF
STOMATOCYTES BLD QL SMEAR: SLIGHT
TRIGL SERPL-MCNC: 99 MG/DL
UROBILINOGEN UR STRIP-ACNC: 1 E.U./DL
WBC # BLD AUTO: 5.6 10E3/UL (ref 4–11)
WBC #/AREA URNS AUTO: ABNORMAL /HPF

## 2025-05-22 RX ORDER — ONDANSETRON 4 MG/1
4 TABLET, ORALLY DISINTEGRATING ORAL ONCE
Status: COMPLETED | OUTPATIENT
Start: 2025-05-22 | End: 2025-05-22

## 2025-05-22 RX ORDER — ONDANSETRON 4 MG/1
4 TABLET, ORALLY DISINTEGRATING ORAL EVERY 8 HOURS PRN
Qty: 12 TABLET | Refills: 0 | Status: SHIPPED | OUTPATIENT
Start: 2025-05-22

## 2025-05-22 RX ADMIN — ONDANSETRON 4 MG: 4 TABLET, ORALLY DISINTEGRATING ORAL at 12:57

## 2025-05-22 RX ADMIN — Medication 1000 ML: at 15:17

## 2025-05-22 NOTE — PROGRESS NOTES
"Urgent Care Clinic Visit    Chief Complaint   Patient presents with    Urgent Care     Frequent Vomiting for the last three days, Diarrhea for the last three days (Somewhat green in color), Sleeplessness, Loss of appetite (No meals for about two days)  States struggling with Alcohol issues - Mostly sober for the last three years. Had a \"Little bit of a binge\" on Monday...    States Imodium hasn't stopped diarrhea.    States Blood results (Liver and Cholestoral) has looked \"alarming\".              5/22/2025    12:19 PM   Additional Questions   Roomed by Ambrocio Becerra RN, [place].   Accompanied by wife - Katie.         5/22/2025   Forms   Any forms needing to be completed Yes             "

## 2025-05-22 NOTE — PROGRESS NOTES
Assessment & Plan     Diagnosis:    ICD-10-CM    1. Nausea and vomiting, unspecified vomiting type  R11.2 ondansetron (ZOFRAN ODT) ODT tab 4 mg      2. RUQ abdominal pain  R10.11       3. LLQ abdominal pain  R10.32       4. Fever, unspecified fever cause  R50.9       5. Gastroenteritis  K52.9           Medical Decision Making:  Patient with history of alcohol use disorder presented to urgent care with fever, N/V/D over the last three days. Low grade fever noted here. He has tenderness in his RUQ/epigastrium and LLQ on exam. The clinical exam today is non-specific. The exact etiology of the abdominal pain is not clear at this time. The differential diagnosis of abdominal pain includes: Appendicitis, Bowel Obstruction, Ulcer, Ischemia, Cholecystitis, Diverticulitis, Pancreatitis, UTI, kidney stone, Enteritis/Colitis,  amongst many other etiologies. Given patient's moderate distress, abdominal exam concerning for pancreatitis/cholecystitis, possibly diverticulitis, low grade fever and history of elevated LFTs/lipse/bilirubin recently; I instructed the patient to go to the [facility] now for further evaluation. His last alcohol drink was 3 days ago; he does not appear to be in withdrawal here. Patient verbalizes understanding and agreement with the plan. All questions answered.     Referral to Acute and Diagnostic Services    [phone] ([place] [phone] [facility], [address], [place] [address]    Transition to Acute & Diagnostic Services Clinic has been discussed with patient, and he agrees with next level of care.   Patient understands that evaluation/treatment at [facility] typically takes significantly longer than in clinic/urgent care (>2 hours).  The [facility] Acute and Diagnostics Services Clinic has been contacted by provider/staff to confirm patient acceptance.         Special issues:      None                     The following provider has assessed this patient for intervention at [facility], and directed the  "patient for referral: GEORGE Solis PA-C  [facility] URGENT CARE    Subjective     Anatoliy Boykin is a 54 year old male who presents to clinic today for the following health issues:  Chief Complaint   Patient presents with    Urgent Care     Frequent Vomiting for the last three days, Diarrhea for the last three days (Somewhat green in color), Sleeplessness, Loss of appetite (No meals for about two days)  States struggling with Alcohol issues - Mostly sober for the last three years. Had a \"Little bit of a binge\" on Monday...    States Imodium hasn't stopped diarrhea.    States Blood results (Liver and Cholestoral) has looked \"alarming\".       HPI  Patient with three days of nausea, vomiting, diarrhea (green watery stools), loss of appetite, more fatigued.  Having some abdominal pain, has been using Imodium which has not stopped the diarrhea.  He notes that he has history of alcoholism, slowed down a lot over the last couple of months but he did drink 2 cocktails and 2 beers this past Monday. He felt a little shaky the next day, but not since.  He does not feel like he is in alcohol withdrawal, has not had a seizure in the past due to alcohol withdrawal.  No black or bloody stools, chest pain, cough, sore throat, URI symptoms.  He has had some fevers and chills over the last couple of days now as well, is wondering if he has some sort of GI bug.    Review of Systems    See HPI    Objective      Vitals: BP (!) 122/90   Pulse 111   Temp 100.3  F (37.9  C) (Tympanic)   Resp 22   Ht 1.829 m (6')   Wt 96.2 kg (212 lb)   SpO2 (S) 98%   BMI 28.75 kg/m        Patient Vitals for the past 24 hrs:   BP Temp Temp src Pulse Resp SpO2 Height Weight   05/22/25 1239 (!) 122/90 -- -- -- 22 -- -- --   05/22/25 1221 (!) 135/91 100.3  F (37.9  C) Tympanic 111 (!) 32 98 % 1.829 m (6') 96.2 kg (212 lb)       Vital signs reviewed by: Sumanth Russell PA-C    Physical Exam "   Constitutional: Patient is alert and cooperative. Mild acute distress.  Mouth: Mucous membranes are moist. Normal tongue and tonsil. Posterior oropharynx is clear. No tongue fasciculations.   Eyes: Conjunctivae and lids are normal. No scleral icterus.  Cardiovascular: Tachycardic. Regular rhythm  Pulmonary/Chest: Lungs are clear to auscultation throughout. Effort normal. No respiratory distress. No wheezes, rales or rhonchi.  GI: Right upper quadrant and left lower quadrant abdominal tenderness to palpation.  Voluntary guarding.  Bowel sounds present.  Neurological: Alert and oriented x3.   Skin: No rash noted on visualized skin. No jaundice  Psychiatric:The patient has a normal mood and affect.     Interventions:  Zofran 4mg PO    Sumanth Russell PA-C, May 22, 2025

## 2025-05-22 NOTE — PROGRESS NOTES
Acute and Diagnostic Services Clinic Visit    {PROVIDER CHARTING PREFERENCE:398170}    Davis Cope is a 54 year old, presenting for the following health issues:  Nausea  {(!) Visit Details have not yet been documented.  Please enter Visit Details and then use this list to pull in documentation. (Optional):233989}  HPI      Nausea/Vomiting/Dehydration  Onset/Duration: Monday had a few drinks (3x)        Nausea: YES       Vomiting:  YES       How many times in the last 24 hours:  6-8x       What is the appearance:  bile orange        Any blood present: no        Diarrhea: YES       Constipation: no        Melena/dark stools: no -green        What has oral intake been like: no fluids        Abdominal pain: YES       Character/location/radiation:  left lower and right   Risks       Recent travel: no [place]       Exposures to ill contacts: no        Recent antibiotics past 2 months: no        Other: drinking   Progression of symptoms: worsening  Accompanying signs and symptoms:       Fever/chills: YES       Gas/bloating: no        Dysuria or hematuria: no        Other symptoms: YES- lower back pain   Therapies tried and outcome: NA/ peptobismol      {ROS Picklists (Optional):653904}      Objective    BP (!) 144/90 (BP Location: Left arm, Patient Position: Chair, Cuff Size: Adult Regular)   Pulse 118   Temp (!) 101.5  F (38.6  C) (Oral)   Resp 20   Wt 96.6 kg (213 lb)   SpO2 97%   BMI 28.89 kg/m    Body mass index is 28.89 kg/m .  Physical Exam   {Exam List (Optional):190246}    {Diagnostic Test Results (Optional):053726}        Signed Electronically by: Adela Asif PA-C  {Email feedback regarding this note to [email or web address]   :424847}

## 2025-05-22 NOTE — PATIENT INSTRUCTIONS
Go to the ER at [facility] for treatment of your hyponatremia and hypokalemia.   Return stool sample to evaluate for infectious causes of diarrhea.  Follow up with hepatology next week as planned.    Take Zofran as needed for nausea.   Small amounts of clear fluids such as Pedialyte, Gatorade, water. May suck on ice chips as well.  Limit dairy products for 5-7 days.  [place] diet such as bananas, rice, applesauce, toast as tolerated.  May use Imodium for diarrhea if necessary but recommend limiting its use.  Follow up promptly if signs of dehydration occur (dry mouth/eyes, decreased urination, lightheaded or pass out), signs of bleeding (dark black stool or blood in stool), or you can't keep anything down.  Follow up if not improving in 2-3 days or if other concerns.    Gastroenteritis  Gastroenteritis is commonly called the stomach flu. It is most often caused by a virus that affects the stomach and intestinal tract and usually lasts from 2 to 7 days. Common viruses causing gastroenteritis include norovirus, rotavirus, and hepatitis A. Non-viral causes of gastroenteritis include bacteria, parasites, and toxins.  The danger from repeated vomiting or diarrhea is dehydration. This is the loss of too much fluid from the body. When this occurs, body fluids must be replaced. Antibiotics do not help with this illness because it is usually viral.Simple home treatment will be helpful.  Symptoms of viral gastroenteritis may include:  Watery, loose stools  Stomach pain or abdominal cramps  Fever and chills  Nausea and vomiting  Loss of bowel control  Headache  Home care  Gastroenteritis is transmitted by contact with the stool or vomit of an infected person. This can occur from person to person or from contact with a contaminated surface.  Follow these guidelines when caring for yourself at home:  If symptoms are severe, rest at home for the next 24 hours or until you are feeling better.  Wash your hands with soap  and water or use alcohol-based  to prevent the spread of infection. Wash your hands after touching anyone who is sick.  Wash your hands or use alcohol-based  after using the toilet and before meals. Clean the toilet after each use.  Remember these tips when preparing food:  People with diarrhea should not prepare or serve food to others. When preparing foods, wash your hands before and after.  Wash your hands after using cutting boards, countertops, knives, or utensils that have been in contact with raw food.  Keep uncooked meats away from cooked and ready-to-eat foods.  Diet  Follow these guidelines for food:  Water and liquids are important so you don't get dehydrated. Drink a small amount at a time or suck on ice chips if you are vomiting.  If you eat, avoid fatty, greasy, spicy, or fried foods.  Don't eat dairy if you have diarrhea. This can make diarrhea worse.  Avoid tobacco, alcohol, and caffeine which may worsen symptoms.  During the first 24 hours (the first full day), follow the diet below:  Beverages. Sports drinks, soft drinks without caffeine, ginger ale, mineral water (plain or flavored), decaffeinated tea and coffee. If you are very dehydrated, sports drinks aren't a good choice. They have too much sugar and not enough electrolytes. In this case, commercially available products called oral rehydration solutions, are best.  Soups. Eat clear broth, consommé, and bouillon.  During the next 24 hours (the second day), you may add the following to the above:  Hot cereal, plain toast, bread, rolls, and crackers  Plain noodles, rice, mashed potatoes, chicken noodle or rice soup  Unsweetened canned fruit (avoid pineapple), bananas  Limit fat intake to less than 15 grams per day. Do this by avoiding margarine, butter, oils, mayonnaise, sauces, gravies, fried foods, peanut butter, meat, poultry, and fish.  Limit fiber and avoid raw or cooked vegetables, fresh fruits (except bananas), and bran  cereals.  Limit caffeine and chocolate. Don't use spices or seasonings other than salt.  Limit dairy products.  Avoid alcohol.  During the next 24 hours:  Gradually resume a normal diet as you feel better and your symptoms improve.  If at any time it starts getting worse again, go back to clear liquids until you feel better.  Follow-up care  Follow up with your healthcare provider, or as advised. Call your provider if you don't get better within 24 hours or if diarrhea lasts more than a week. Also follow up if you are unable to keep down liquids and get dehydrated. If a stool (diarrhea) sample was taken, call as directed for the results.  Date Last Reviewed: 1/3/2016

## 2025-05-22 NOTE — LETTER
May 22, 2025      Anatoliy Boykin  [address] [place] [id number]        To Whom It May Concern:    Anatoliy Boykin was seen in clinic 5/22/25. Please excuse his absence from work.        Sincerely,        Adela Asif PA-C    Electronically signed

## 2025-05-22 NOTE — PROGRESS NOTES
Assessment/Plan:    CMP indicates hyponatremia (130), moderate to severe hypokalemia (2.7), & hypochloremia. LFTs have improved (AST 46, ALT and P normal, bilirubin 1.5).   Initial CBC shows thrombocytopenia, which may be related to his alcohol use/hepatomegaly. This can be monitored.   UA with positive nitrite but negative leuk esterase and only 5-10 WBCs, quite a bit of squamous epithelials. No urinary symptoms. Suspect contaminant, urine culture in process.     CT abd/pelvis shows:  1.  Mild bowel wall thickening involving the ascending, transverse, and descending colon, as well as several loops of terminal ileum, suggesting a nonspecific enterocolitis.  2.  Marked hepatic steatosis.  3.  Splenomegaly.  4.  Nondisplaced fracture of the right 10th rib posteriorly is age-indeterminate.    Suspect enterocolitis is cause of patient's symptoms, likely infectious. Due to associated fever, stool studies ordered. Rx Zofran.   Splenomegaly of uncertain significance. Continue to follow up with PCP.     Due to significant electrolyte abnormalities, advised pt go to ER at [facility] for additional IV fluids and K+/Na+ replacement. Patient was given 1 L of normal saline here. He agrees to go to the ER but may do so later today. HR improved from 118 to 92 after 1 L of normal saline.    See patient instructions below.    At the end of the encounter, I discussed results, diagnosis, medications. Discussed red flags for immediate return to clinic/ER, as well as indications for follow up if no improvement. Patient understood and agreed to plan. Patient was stable for discharge.    45 minutes was spent preparing for the visit and reviewing the patient's chart; getting a history and performing an exam; counseling and providing education to the patient, family, or caregiver; ordering medicines and/or tests; communicating with other healthcare professionals; documenting information in the medical record; interpreting  results and sharing that information with the patient, family, or caregiver; and care coordination.       ICD-10-CM    1. Abdominal pain, generalized  R10.84 CBC with platelets differential     Comprehensive metabolic panel     Lipase     UA with Microscopic reflex to Culture     sodium chloride (PF) 0.9% PF flush 3 mL     CT Abdomen Pelvis w Contrast     CBC with platelets differential     Comprehensive metabolic panel     Lipase     UA with Microscopic reflex to Culture     Urine Microscopic Exam     Urine Culture      2. Fever in adult  R50.9       3. Nausea vomiting and diarrhea  R11.2 sodium chloride 0.9% BOLUS 1,000 mL    R19.7 ondansetron (ZOFRAN ODT) 4 MG ODT tab      4. Enterocolitis  K52.9 Enteric Bacteria and Virus Panel by RONNY Stool     C. difficile Toxin B PCR with reflex to C. difficile EIA     Enteric Bacteria and Virus Panel by RONNY Stool     C. difficile Toxin B PCR with reflex to C. difficile EIA      5. Hyponatremia  E87.1       6. Hypokalemia  E87.6       7. Splenomegaly  R16.1       8. Hepatic steatosis  K76.0 Phosphatidylethanol (PEth), Whole Blood     INR     Lipid Profile (Chol, Trig, HDL, LDL calc)     Lipid Profile (Chol, Trig, HDL, LDL calc)     INR      9. Thrombocytopenia  D69.6             No follow-ups on file.    Adela Asif, LISHA, PA-ANGELICA  [facility]  -----------------------------------------------------------------------------------------------------------------------------------------------------    HPI:  Anatoliy Boykin is a 54 year old male who presents for evaluation of the following:    Nausea/Vomiting/Dehydration  Onset/Duration: 3 days       Nausea: YES       Vomiting:  YES       How many times in the last 24 hours:  6-8x       What is the appearance: orange       Any blood present: no        Diarrhea: YES- greenish       Constipation: no        Melena/dark stools: no       What has oral intake been like: no food, very little fluids        "Abdominal pain: YES- LLQ, RUQ  Risks       Recent travel: no        Exposures to ill contacts: no        Recent antibiotics past 2 months: no   Progression of symptoms: worsening  Accompanying signs and symptoms:       Fever/chills: YES       Gas/bloating: no        Dysuria or hematuria: no        Other symptoms: YES- mild lower back pain   Therapies tried and outcome: peptobismol    Pt has a history of alcohol use disorder, had been cutting back last couple of months but then went on a \"binge\" on Monday. Symptoms started after that. Has not had any drinks since Monday.   Reports dry mouth, decreased urination.  Recently LFTs have been elevated, he had a RUQ ultrasound 4/18/25 which showed moderate hepatomegaly. He was referred to hepatology and has an appt with them next week. Hepatology and primary care had ordered some labs he was to have drawn tomorrow, wonders if he can have those done now.    Past Medical History:   Diagnosis Date    social anxiety disorder 1998       Vitals:    05/22/25 1359 05/22/25 1613   BP: (!) 144/90    BP Location: Left arm    Patient Position: Chair    Cuff Size: Adult Regular    Pulse: 118 92   Resp: 20    Temp: (!) 101.5  F (38.6  C)    TempSrc: Oral    SpO2: 97% 99%   Weight: 96.6 kg (213 lb)        Physical Exam  Vitals and nursing note reviewed.   Pulmonary:      Effort: Pulmonary effort is normal.   Abdominal:      General: Bowel sounds are normal.      Palpations: Abdomen is soft.      Tenderness: There is abdominal tenderness in the right upper quadrant and left lower quadrant. There is no right CVA tenderness, left CVA tenderness, guarding or rebound.   Neurological:      Mental Status: He is alert.         Labs/Imaging:  Results for orders placed or performed in visit on 05/22/25 (from the past 24 hours)   CBC with platelets differential    Narrative    The following orders were created for panel order CBC with platelets differential.  Procedure                               " Abnormality         Status                     ---------                               -----------         ------                     CBC with platelets and ...[[id number]]                      In process                   Please view results for these tests on the individual orders.   Comprehensive metabolic panel   Result Value Ref Range    Sodium 130 (L) 135 - 145 mmol/L    Potassium 2.7 (L) 3.4 - 5.3 mmol/L    Chloride 89 (L) 94 - 109 mmol/L    Carbon Dioxide (CO2) 24 20 - 32 mmol/L    Anion Gap 17 (H) 3 - 14 mmol/L    Urea Nitrogen 14 7 - 30 mg/dL    Creatinine 1.00 0.66 - 1.25 mg/dL    GFR Estimate >90 >60 mL/min/1.73m2    Calcium 9.6 8.5 - 10.1 mg/dL    Glucose 113 (H) 70 - 99 mg/dL    Alkaline Phosphatase 69 40 - 150 U/L    AST 46 (H) 0 - 45 U/L    ALT 21 0 - 70 U/L    Protein Total 6.8 6.8 - 8.8 g/dL    Albumin 4.0 3.4 - 5.0 g/dL    Bilirubin Total 1.5 (H) 0.2 - 1.3 mg/dL   Lipase   Result Value Ref Range    Lipase 34 13 - 60 U/L   UA with Microscopic reflex to Culture    Specimen: Urine, Clean Catch   Result Value Ref Range    Color Urine Yellow Colorless, Straw, Light Yellow, Yellow    Appearance Urine Clear Clear    Glucose Urine Negative Negative mg/dL    Bilirubin Urine Large (A) Negative    Ketones Urine 80 (A) Negative mg/dL    Specific Gravity Urine >=1.030 1.003 - 1.035    Blood Urine Negative Negative    pH Urine 6.0 5.0 - 7.0    Protein Albumin Urine >=300 (A) Negative mg/dL    Urobilinogen Urine 1.0 0.2, 1.0 E.U./dL    Nitrite Urine Positive (A) Negative    Leukocyte Esterase Urine Negative Negative   Urine Microscopic Exam   Result Value Ref Range    Bacteria Urine Few (A) None Seen /HPF    RBC Urine 2-5 (A) 0-2 /HPF /HPF    WBC Urine 5-10 (A) 0-5 /HPF /HPF    Squamous Epithelials Urine Moderate (A) None Seen /LPF    Mucus Urine Present (A) None Seen /LPF     Recent Results (from the past 24 hours)   CT Abdomen Pelvis w Contrast    Narrative    EXAM: CT ABDOMEN PELVIS W CONTRAST  LOCATION: [facility]  DATE: 5/22/2025    INDICATION: Fever. Nausea and vomiting. Diarrhea. Abdominal pain. Elevated liver function tests.  COMPARISON: None.  TECHNIQUE: CT scan of the abdomen and pelvis was performed following injection of IV contrast. Multiplanar reformats were obtained. Dose reduction techniques were used.  CONTRAST: 107 mL Isovue 370.    FINDINGS:   LOWER CHEST: Unremarkable.    HEPATOBILIARY: Marked hepatic steatosis. No hepatic masses. No calcified gallstones. No evidence for biliary dilatation.    PANCREAS: Normal.    SPLEEN: The spleen is enlarged, measuring 13.9 cm in length.    ADRENAL GLANDS: Normal.    KIDNEYS/BLADDER: No significant mass, stone, or hydronephrosis.    BOWEL: Mild bowel wall thickening in the ascending, transverse, and descending colon suggests a nonspecific colitis. There is also mild bowel wall thickening involving several loops of terminal ileum, suggesting enteritis. No bowel obstruction.   Unremarkable appendix.    LYMPH NODES: No lymphadenopathy.    VASCULATURE: Unremarkable.    PELVIC ORGANS: Trace amount of nonspecific free fluid in the pelvis.    MUSCULOSKELETAL: Nondisplaced fracture of the right 10th rib posteriorly is age-indeterminate. There are additional old right lower rib fractures.      Impression    IMPRESSION:   1.  Mild bowel wall thickening involving the ascending, transverse, and descending colon, as well as several loops of terminal ileum, suggesting a nonspecific enterocolitis.  2.  Marked hepatic steatosis.  3.  Splenomegaly.  4.  Nondisplaced fracture of the right 10th rib posteriorly is age-indeterminate.           Patient Instructions   Go to the ER at [facility] for treatment of your hyponatremia and hypokalemia.   Return stool sample to evaluate for infectious causes of diarrhea.  Follow up with hepatology next week as planned.    Take Zofran as needed for nausea.   Small amounts of clear fluids such as Pedialyte,  Gatorade, water. May suck on ice chips as well.  Limit dairy products for 5-7 days.  [place] diet such as bananas, rice, applesauce, toast as tolerated.  May use Imodium for diarrhea if necessary but recommend limiting its use.  Follow up promptly if signs of dehydration occur (dry mouth/eyes, decreased urination, lightheaded or pass out), signs of bleeding (dark black stool or blood in stool), or you can't keep anything down.  Follow up if not improving in 2-3 days or if other concerns.    Gastroenteritis  Gastroenteritis is commonly called the stomach flu. It is most often caused by a virus that affects the stomach and intestinal tract and usually lasts from 2 to 7 days. Common viruses causing gastroenteritis include norovirus, rotavirus, and hepatitis A. Non-viral causes of gastroenteritis include bacteria, parasites, and toxins.  The danger from repeated vomiting or diarrhea is dehydration. This is the loss of too much fluid from the body. When this occurs, body fluids must be replaced. Antibiotics do not help with this illness because it is usually viral.Simple home treatment will be helpful.  Symptoms of viral gastroenteritis may include:  Watery, loose stools  Stomach pain or abdominal cramps  Fever and chills  Nausea and vomiting  Loss of bowel control  Headache  Home care  Gastroenteritis is transmitted by contact with the stool or vomit of an infected person. This can occur from person to person or from contact with a contaminated surface.  Follow these guidelines when caring for yourself at home:  If symptoms are severe, rest at home for the next 24 hours or until you are feeling better.  Wash your hands with soap and water or use alcohol-based  to prevent the spread of infection. Wash your hands after touching anyone who is sick.  Wash your hands or use alcohol-based  after using the toilet and before meals. Clean the toilet after each use.  Remember these tips when preparing  food:  People with diarrhea should not prepare or serve food to others. When preparing foods, wash your hands before and after.  Wash your hands after using cutting boards, countertops, knives, or utensils that have been in contact with raw food.  Keep uncooked meats away from cooked and ready-to-eat foods.  Diet  Follow these guidelines for food:  Water and liquids are important so you don't get dehydrated. Drink a small amount at a time or suck on ice chips if you are vomiting.  If you eat, avoid fatty, greasy, spicy, or fried foods.  Don't eat dairy if you have diarrhea. This can make diarrhea worse.  Avoid tobacco, alcohol, and caffeine which may worsen symptoms.  During the first 24 hours (the first full day), follow the diet below:  Beverages. Sports drinks, soft drinks without caffeine, ginger ale, mineral water (plain or flavored), decaffeinated tea and coffee. If you are very dehydrated, sports drinks aren't a good choice. They have too much sugar and not enough electrolytes. In this case, commercially available products called oral rehydration solutions, are best.  Soups. Eat clear broth, consommé, and bouillon.  During the next 24 hours (the second day), you may add the following to the above:  Hot cereal, plain toast, bread, rolls, and crackers  Plain noodles, rice, mashed potatoes, chicken noodle or rice soup  Unsweetened canned fruit (avoid pineapple), bananas  Limit fat intake to less than 15 grams per day. Do this by avoiding margarine, butter, oils, mayonnaise, sauces, gravies, fried foods, peanut butter, meat, poultry, and fish.  Limit fiber and avoid raw or cooked vegetables, fresh fruits (except bananas), and bran cereals.  Limit caffeine and chocolate. Don't use spices or seasonings other than salt.  Limit dairy products.  Avoid alcohol.  During the next 24 hours:  Gradually resume a normal diet as you feel better and your symptoms improve.  If at any time it starts getting worse again, go back  to clear liquids until you feel better.  Follow-up care  Follow up with your healthcare provider, or as advised. Call your provider if you don't get better within 24 hours or if diarrhea lasts more than a week. Also follow up if you are unable to keep down liquids and get dehydrated. If a stool (diarrhea) sample was taken, call as directed for the results.  Date Last Reviewed: 1/3/2016

## 2025-05-26 ENCOUNTER — PATIENT OUTREACH (OUTPATIENT)
Dept: CARE COORDINATION | Facility: CLINIC | Age: 55
End: 2025-05-26
Payer: COMMERCIAL

## 2025-05-28 NOTE — PROGRESS NOTES
Hepatology Clinic note  Anatoliy Boykin   Date of Birth 1970    REASON FOR CONSULTATION: elevated LFTs, hyperbilirubinemia  REFERRING PROVIDER: Efrain Posey PA-C         Assessment/plan:   Anatoliy Boykin is a 54 year old male with with likely history of EtOH cirrhosis based on recent imaging of CT abdomen showing marked hepatic steatosis, splenomegaly and recent findings of liver dysfunction total bilirubin of 3.0, primarily indirect, mildly elevated INR and low platelets.  Liver function improving over the past few months.  Last EtOH use was in the past 24 hours.     # Likely EtOH cirrhosis, recent labs showing MELD 3.0. 14, driven by Na 130, Tbili 1.5 and INR 1.18   - Discussed diagnosis of cirrhosis, natural history of alcohol liver disease, complications of cirrhosis and need for routine screening.  -Will also rule out overlapping hepatobiliary disease    # History of alcohol use disorder, severe:   - Aim for absolute sobriety from alcohol  - Recommend reduction of alcohol use slowly given high risk for withdrawal/seizure or consideration of alcohol detox facility. He declines referral today and family not aware of his use.   - Continue optimization of mental health citing anxiety primary  in alcohol use. Currently seeing behavior therapy for social anxiety     # Variceal screening, discussed indications in the setting of cirrhosis:   # Recent abdominal pain, nausea vomiting, likely gastritis  - Discussed indications for variceal screening  - EGD ideally within the next 2-3 months   - Start omeprazole 20 mg twice daily 30 minutes prior to meal    #HCC screening, up to date:   - US abdomen complete due November     #Change in stools: (5 loose stools a day)   - enteric panel if persistent  - fecal elastase     #No radiologic or clinical evidence of ascites or fluid overload:  # No overt evidence of hepatic encephalopathy or asterixis on exam     # Follow-up in clinic in 6  months     Peri  GEORGE Bauer   [facility] Hepatology     Total time for E/M services performed on the date of the encounter 60 minutes.  This included review of previous: clinic visits, hospital records, lab results, imaging studies, and procedural documentation. Time also includes patient visit, documentation and discussion with other providers.  The findings from this review are summarized in the above note.   -----------------------------------------------------       HPI:   Anatoliy Boykin is a 54 year old male  presenting for the evaluation of elevated LFT's.     Recently recently saw PCP for evaluation of abdominal pain and nausea. Evaluation showing hyponatremia, hypokalemia and elevated AST and mildly elevated T. Bili prompting a referral to a liver specialist. CT abdomen showing nonspecific enterocolitis, hepatic steatosis, splenomegaly nondisplaced fracture of the right 10th rib (age indeterminant).     - Reports poor appetite, managing only a few handfuls of food, and significant weight loss of approximately 15 lbs over 20 days.  - Experiences nausea and vomiting every other day, typically in the early morning, with vomit described as mostly liquid.  - Occasional sharp abdominal pain, especially after heavy drinking, but cannot specify the exact location.  - Reports diarrhea for the past few days, with bowel movements about five times a day, and describes them as loose rather than liquid. Noted a change in stool color to green a couple of weeks ago, which has since resolved.  - Denies any swelling or fluid retention in the legs, and no abdominal bloating.  - Experienced a fever about 10 days ago, possibly related to a binge drinking episode, accompanied by diarrhea and vomiting for three to four days.  - Reports occasional problems with concentration, which improve during periods of sobriety.  - Reports a permanently dry mouth but no dry eyes.  - Noted high ferritin and iron saturation levels in past lab  results.  - Suspects having vitiligo for the past 6 to 8 months, with no formal diagnosis yet.  - Takes Lexapro for anxiety, which he identifies as a trigger for his alcohol use, and Zofran intermittently for nausea. Recently started seeing a therapist for social anxiety.'    PMH:    has a past medical history of social anxiety disorder (1998).  History of alcohol use disorder    SMH:    has no past surgical history on file.     Medications:   escitalopram  ondansetron  rosuvastatin  sodium chloride (PF)     - reports a history of heavy alcohol consumption for about 20 years, typically consuming 8 to 10 shots daily, starting in the morning. Reports he has been hiding his alcohol use from his family for 20 years.  - Experienced a brief period of sobriety for about a month, which was the longest in years, but has since resumed drinking.  - No history of IV or intranasal drug use.  - Works at [employer]. He lives with his wife.    Previous work-up:   Lab Results   Component Value Date    HEPBANG Nonreactive 04/11/2025    HBCAB Nonreactive 04/11/2025    AUSAB 28.10 04/11/2025    HCVAB Nonreactive 04/11/2025    JENNIFER 1,436 (H) 04/11/2025    IRONSAT 65 (H) 04/11/2025    CHOL 169 05/22/2025    HDL 77 05/22/2025    LDL 72 05/22/2025    TRIG 99 05/22/2025    A1C 4.4 04/11/2025      Recent Labs   Lab Test 05/22/25  1433 04/11/25  1013 03/16/25  1639   ALKPHOS 69 137 120   ALT 21 116* 273*   AST 46* 177* 474*   BILITOTAL 1.5* 3.5* 3.0*             Allergies:     Allergies   Allergen Reactions    Pcn [Penicillins]      As child, pt does not know reaction            Social History:     Social History     Socioeconomic History    Marital status: Single     Spouse name: Not on file    Number of children: 0    Years of education: Not on file    Highest education level: Not on file   Occupational History    Occupation:      Employer: [employer]   Tobacco Use    Smoking status: Never     Smokeless tobacco: Never   Vaping Use    Vaping status: Never Used   Substance and Sexual Activity    Alcohol use: Yes     Comment: 2+ drinks weekly    Drug use: No    Sexual activity: Yes     Partners: Female   Other Topics Concern    Parent/sibling w/ CABG, MI or angioplasty before 65F 55M? No   Social History Narrative    Not on file     Social Drivers of Health     Financial Resource Strain: Low Risk  (4/9/2025)    Financial Resource Strain     Within the past 12 months, have you or your family members you live with been unable to get utilities (heat, electricity) when it was really needed?: No   Food Insecurity: Low Risk  (4/9/2025)    Food Insecurity     Within the past 12 months, did you worry that your food would run out before you got money to buy more?: No     Within the past 12 months, did the food you bought just not last and you didn t have money to get more?: No   Transportation Needs: Low Risk  (4/9/2025)    Transportation Needs     Within the past 12 months, has lack of transportation kept you from medical appointments, getting your medicines, non-medical meetings or appointments, work, or from getting things that you need?: No   Physical Activity: Insufficiently Active (4/9/2025)    Exercise Vital Sign     Days of Exercise per Week: 3 days     Minutes of Exercise per Session: 30 min   Stress: Stress Concern Present (4/9/2025)    [employer] [place] [employer] - Occupational Stress Questionnaire     Feeling of Stress : Rather much   Social Connections: Unknown (4/9/2025)    Social Connection and Isolation Panel [NHANES]     Frequency of Communication with Friends and Family: Not on file     Frequency of Social Gatherings with Friends and Family: Once a week     Attends Gnosticist Services: Not on file     Active Member of Clubs or Organizations: Not on file     Attends Club or Organization Meetings: Not on file     Marital Status: Not on file   Interpersonal Safety: Low Risk  (4/11/2025)     Interpersonal Safety     Do you feel physically and emotionally safe where you currently live?: Yes     Within the past 12 months, have you been hit, slapped, kicked or otherwise physically hurt by someone?: No     Within the past 12 months, have you been humiliated or emotionally abused in other ways by your partner or ex-partner?: No   Housing Stability: Low Risk  (2025)    Housing Stability     Do you have housing? : Yes     Are you worried about losing your housing?: No            Family History:     Family History   Problem Relation Age of Onset    Hypertension Mother     EYE* Mother         Cataracts     Hypertension Father     EYE* Father         Cataracts     Arrhythmia Father     Cancer Maternal Grandmother     Colon Cancer Maternal Grandmother          at 66    Hypertension Maternal Uncle     Diabetes Maternal Uncle     Myocardial Infarction Maternal Grandfather          at 83    Arrhythmia Maternal Grandfather     Hypertension Maternal Grandfather     Myocardial Infarction Paternal Grandmother          72    Kidney Disease Paternal Grandfather     Cancer Paternal Uncle     Cancer Paternal Aunt             Review of Systems:     Gen: See HPI     HEENT: No change in vision or hearing, mouth sores, dysphagia, lymph nodes  Resp: No shortness of breath, coughing, hx of asthma  CV: No chest pain, palpitations, syncope   GI: See HPI  : No dysuria, history of stones, urine color    Skin: No rash; no pruritus or psoriasis  MS: No arthralgias, myalgias, joint swelling  Neuro: No memory changes, confusion, numbness    Heme: No difficulty clotting, bruising, bleeding  Psych:  No anxiety, depression, agitation          Physical Exam:     VS: /84   Pulse 101   Resp 20   Ht 1.829 m (6')   Wt 94 kg (207 lb 3.2 oz)   SpO2 96%   BMI 28.10 kg/m      Gen: A&Ox3, NAD, well developed  HEENT: non-icteric   CV: RRR, no overt murmurs  Lung: CTA Bilatererally, no wheezing or crackles.   Lym- no  "palpable lymphadenopathy  Abd: soft, NT, ND, no palpable splenomegaly, liver is not palpable.   Ext: no edema, intact pulses.   Skin: No rash, no palmar erythema, telangiectasias or jaundice  Neuro: grossly intact, no asterixis   Psych: appropriate mood and affects         Data:   Reviewed in person and significant for:    Lab Results   Component Value Date     05/22/2025      Lab Results   Component Value Date    POTASSIUM 2.7 05/22/2025     Lab Results   Component Value Date    CHLORIDE 89 05/22/2025     Lab Results   Component Value Date    CO2 24 05/22/2025     Lab Results   Component Value Date    BUN 14 05/22/2025     Lab Results   Component Value Date    CR 1.00 05/22/2025       Lab Results   Component Value Date    WBC 5.6 05/22/2025     Lab Results   Component Value Date    HGB 13.7 05/22/2025     Lab Results   Component Value Date    HCT 37.9 05/22/2025     Lab Results   Component Value Date    MCV 92 05/22/2025     Lab Results   Component Value Date    PLT 68 05/22/2025       Lab Results   Component Value Date    AST 46 05/22/2025     Lab Results   Component Value Date    ALT 21 05/22/2025     No results found for: \"BILICONJ\"   Lab Results   Component Value Date    BILITOTAL 1.5 05/22/2025       Lab Results   Component Value Date    ALBUMIN 4.0 05/22/2025     Lab Results   Component Value Date    PROTTOTAL 6.8 05/22/2025      Lab Results   Component Value Date    ALKPHOS 69 05/22/2025       Lab Results   Component Value Date    INR 1.18 05/22/2025         Imaging:        EXAM: CT ABDOMEN PELVIS W CONTRAST  LOCATION: [facility]  DATE: 5/22/2025     INDICATION: Fever. Nausea and vomiting. Diarrhea. Abdominal pain. Elevated liver function tests.  COMPARISON: None.  TECHNIQUE: CT scan of the abdomen and pelvis was performed following injection of IV contrast. Multiplanar reformats were obtained. Dose reduction techniques were used.  CONTRAST: 107 mL Isovue 370.     FINDINGS: "   LOWER CHEST: Unremarkable.     HEPATOBILIARY: Marked hepatic steatosis. No hepatic masses. No calcified gallstones. No evidence for biliary dilatation.     PANCREAS: Normal.     SPLEEN: The spleen is enlarged, measuring 13.9 cm in length.     ADRENAL GLANDS: Normal.     KIDNEYS/BLADDER: No significant mass, stone, or hydronephrosis.     BOWEL: Mild bowel wall thickening in the ascending, transverse, and descending colon suggests a nonspecific colitis. There is also mild bowel wall thickening involving several loops of terminal ileum, suggesting enteritis. No bowel obstruction.   Unremarkable appendix.     LYMPH NODES: No lymphadenopathy.     VASCULATURE: Unremarkable.     PELVIC ORGANS: Trace amount of nonspecific free fluid in the pelvis.     MUSCULOSKELETAL: Nondisplaced fracture of the right 10th rib posteriorly is age-indeterminate. There are additional old right lower rib fractures.                                                                      IMPRESSION:   1.  Mild bowel wall thickening involving the ascending, transverse, and descending colon, as well as several loops of terminal ileum, suggesting a nonspecific enterocolitis.  2.  Marked hepatic steatosis.  3.  Splenomegaly.  4.  Nondisplaced fracture of the right 10th rib posteriorly is age-indeterminate.

## 2025-05-29 ENCOUNTER — OFFICE VISIT (OUTPATIENT)
Dept: GASTROENTEROLOGY | Facility: CLINIC | Age: 55
End: 2025-05-29
Attending: STUDENT IN AN ORGANIZED HEALTH CARE EDUCATION/TRAINING PROGRAM
Payer: COMMERCIAL

## 2025-05-29 VITALS
WEIGHT: 207.2 LBS | SYSTOLIC BLOOD PRESSURE: 117 MMHG | BODY MASS INDEX: 28.06 KG/M2 | DIASTOLIC BLOOD PRESSURE: 84 MMHG | OXYGEN SATURATION: 96 % | HEART RATE: 101 BPM | RESPIRATION RATE: 20 BRPM | HEIGHT: 72 IN

## 2025-05-29 DIAGNOSIS — R10.13 EPIGASTRIC PAIN: ICD-10-CM

## 2025-05-29 DIAGNOSIS — R19.7 DIARRHEA, UNSPECIFIED TYPE: ICD-10-CM

## 2025-05-29 DIAGNOSIS — E78.2 MIXED HYPERLIPIDEMIA: ICD-10-CM

## 2025-05-29 DIAGNOSIS — R74.8 ELEVATED LIVER ENZYMES: ICD-10-CM

## 2025-05-29 DIAGNOSIS — K70.30 ALCOHOLIC CIRRHOSIS OF LIVER WITHOUT ASCITES (H): Primary | ICD-10-CM

## 2025-05-29 DIAGNOSIS — E80.6 HYPERBILIRUBINEMIA: ICD-10-CM

## 2025-05-29 DIAGNOSIS — R79.89 ELEVATED FERRITIN: ICD-10-CM

## 2025-05-29 PROCEDURE — 1126F AMNT PAIN NOTED NONE PRSNT: CPT | Performed by: PHYSICIAN ASSISTANT

## 2025-05-29 PROCEDURE — 99205 OFFICE O/P NEW HI 60 MIN: CPT | Performed by: PHYSICIAN ASSISTANT

## 2025-05-29 PROCEDURE — 3079F DIAST BP 80-89 MM HG: CPT | Performed by: PHYSICIAN ASSISTANT

## 2025-05-29 PROCEDURE — G2211 COMPLEX E/M VISIT ADD ON: HCPCS | Performed by: PHYSICIAN ASSISTANT

## 2025-05-29 PROCEDURE — 3074F SYST BP LT 130 MM HG: CPT | Performed by: PHYSICIAN ASSISTANT

## 2025-05-29 ASSESSMENT — PAIN SCALES - GENERAL: PAINLEVEL_OUTOF10: NO PAIN (0)

## 2025-05-29 NOTE — PATIENT INSTRUCTIONS
Based on our discussion, I have outlined the following instructions for you:    - Try to drink less alcohol and work towards stopping completely to help your liver get better.  - It's a good idea to check your body's salt and water balance because of diarrhea and possible dehydration.  - We will do some new blood tests to see how your liver is doing.  - We will keep an eye on your bilirubin levels with more blood tests.  - Make sure to drink plenty of water to help with diarrhea and avoid getting dehydrated.  - We will do more blood tests to check your iron levels and see if there's too much iron in your body.  - It's very important to cut down on alcohol and aim to stop drinking altogether.  - Continue optimization of mental health   - We talked about the possibility of getting help if you find it hard to stop drinking.(Referrals/facilities below)  - Trial of omeprazole 20 mg twice daily 30 minutes prior to meals to help with upper mid abdominal pain.   - Agree with trial of gabapentin to help with anxiety and to make it easier to reduce alcohol consumption.    Thank you again for your visit, and we look forward to supporting you in your journey to better health.    It was a pleasure to see you at your recent visit. Please let me know if you have any questions or concerns.     Clinic Staff - [phone] option 2     Sincerely,     Peri Bauer PA-C     [address] [facility], Mail [address]  [place]  [address].        Treatment Options:  [place] [id number] [address] [address]  [phone]  *Please call the above number to coordinate your admission    [facility]  Address: [address] [place] [address]  Phone: [phone]    [facility]: (Administration/Intake Offices)   [address] [place] [address]  Phone: [phone]  Fax: [phone]    [facility]  Services  [email or web address]  [phone] or [phone]  [address] [place] [address]. [place]   Services include screening assessments, medically supervised detoxification, inpatient and outpatient evaluation and referral, combined inpatient to outpatient treatment sequences, family counseling and aftercare.     [place] [facility]   www.[email or web address]  [phone] [address] [facility]   Serves adults and teens (minimum age is 16); has short-term treatment and a long-term recovery program; uses 12-step, cognitive behavioral therapy, motivational enhancement; faithbased, and recovery management; high school on-site and [employer] programming available     [facility]   [email or web address]  [phone]  Women?s Programs  [address] [facility]    [facility]   [email or web address]  [phone]  [address] [place] [address] [facility]    [facility] (Inpatient & Outpatient)  [email or web address]  [phone]  Phone consultation available 24/7  In-person Assessments  [address] [facility] [address], [place] [address]  [address] [address] [place] [address] [address] [place] [address] (Inpatient & Outpatient)  [email or web address]  [place], [place]  Contact  for [facility] Evaluation, [phone]  Funded by: Rule 25 in [facility]. Medical Assistance (MA) providers of [facility], [employer], Blue Cross, [employer], [employer], Medicare A&B. Private insurance reviewed case by case.    [facility] Inpatient & Outpatient)  [email or web address]  [phone]  [address]  [facility]  [email or web address]  [phone]  [facility] - [place]  [address] [facility] [address], [address]  [place] [address]  Assessments are available during the day and on a walk-in basis Monday-Friday starting at 8am.    [facility]  [phone]  [email or web address]  [address] [place] [address]    [facility] (Residential & Outpatient)  [email or web address]  Women?s Programs: [phone], [address], [place] [address]    [facility] (Does Rule 25 Assessments)  [email or web address]  [phone]  [address], [address], [place] [address]    [place]  [email or web address]  [phone]  [address] [place] [address]  [address] [place] [address]  Rule 25 Assessments, Substance Abuse Assessments, Men?s & Women?s Programs    [employer]  [email or web address]/[email or web address]-treatment  [phone], [address], [place] [address]  [phone], [address], [place], [place] [address] [phone], [address] [facility], [address], [place], [place] [address] [phone], [address], [address], ([facility]), [place], [place] [id number] [phone], [address] [address] [place] [address]    National [place] on Drug Abuse  https://www.drugabuse.gov/nidamed-medical-health-professionals    Rule 25 Locations  [facility] - walk-in available  Address: [address] [place] [address]  Phone: [phone]     [facility] - walk-in available  Address: [address] [place] [address]  Phone: [phone]      [facility] - appointments only  Address: [address] [place] [address]  Phone: [phone]

## 2025-05-29 NOTE — Clinical Note
Please reach out to patient for labs in the next month or so.  Please also schedule follow up with me in six months.   Thanks, Peri

## 2025-05-29 NOTE — LETTER
5/29/2025      Anatoliy Boykin  [address] [place] [id number]      Dear Colleague,    Thank you for referring your patient, Anatoliy Boykin, to the [facility] [place]. Please see a copy of my visit note below.    Hepatology Clinic note  Anatoliy Boykin   Date of Birth 1970    REASON FOR CONSULTATION: elevated LFTs, hyperbilirubinemia  REFERRING PROVIDER: Efrain Posey PA-C         Assessment/plan:   Anatoliy Boykin is a 54 year old male with with likely history of EtOH cirrhosis based on recent imaging of CT abdomen showing marked hepatic steatosis, splenomegaly and recent findings of liver dysfunction total bilirubin of 3.0, primarily indirect, mildly elevated INR and low platelets.  Liver function improving over the past few months.  Last EtOH use was in the past 24 hours.     # Likely EtOH cirrhosis, recent labs showing MELD 3.0. 14, driven by Na 130, Tbili 1.5 and INR 1.18   - Discussed diagnosis of cirrhosis, natural history of alcohol liver disease, complications of cirrhosis and need for routine screening.  -Will also rule out overlapping hepatobiliary disease    # History of alcohol use disorder, severe:   - Aim for absolute sobriety from alcohol  - Recommend reduction of alcohol use slowly given high risk for withdrawal/seizure or consideration of alcohol detox facility. He declines referral today and family not aware of his use.   - Continue optimization of mental health citing anxiety primary  in alcohol use. Currently seeing behavior therapy for social anxiety     # Variceal screening, discussed indications in the setting of cirrhosis:   # Recent abdominal pain, nausea vomiting, likely gastritis  - Discussed indications for variceal screening  - EGD ideally within the next 2-3 months   - Start omeprazole 20 mg twice daily 30 minutes prior to meal    #HCC screening, up to date:   - US abdomen complete due November     #Change in stools: (5 loose  stools a day)   - enteric panel if persistent  - fecal elastase     #No radiologic or clinical evidence of ascites or fluid overload:  # No overt evidence of hepatic encephalopathy or asterixis on exam     # Follow-up in clinic in 6  months     Peri Bauer PA-C   [facility]     Total time for E/M services performed on the date of the encounter 60 minutes.  This included review of previous: clinic visits, hospital records, lab results, imaging studies, and procedural documentation. Time also includes patient visit, documentation and discussion with other providers.  The findings from this review are summarized in the above note.   -----------------------------------------------------       HPI:   Anatoliy Boykin is a 54 year old male  presenting for the evaluation of elevated LFT's.     Recently recently saw PCP for evaluation of abdominal pain and nausea. Evaluation showing hyponatremia, hypokalemia and elevated AST and mildly elevated T. Bili prompting a referral to a liver specialist. CT abdomen showing nonspecific enterocolitis, hepatic steatosis, splenomegaly nondisplaced fracture of the right 10th rib (age indeterminant).     - Reports poor appetite, managing only a few handfuls of food, and significant weight loss of approximately 15 lbs over 20 days.  - Experiences nausea and vomiting every other day, typically in the early morning, with vomit described as mostly liquid.  - Occasional sharp abdominal pain, especially after heavy drinking, but cannot specify the exact location.  - Reports diarrhea for the past few days, with bowel movements about five times a day, and describes them as loose rather than liquid. Noted a change in stool color to green a couple of weeks ago, which has since resolved.  - Denies any swelling or fluid retention in the legs, and no abdominal bloating.  - Experienced a fever about 10 days ago, possibly related to a binge drinking episode, accompanied by  diarrhea and vomiting for three to four days.  - Reports occasional problems with concentration, which improve during periods of sobriety.  - Reports a permanently dry mouth but no dry eyes.  - Noted high ferritin and iron saturation levels in past lab results.  - Suspects having vitiligo for the past 6 to 8 months, with no formal diagnosis yet.  - Takes Lexapro for anxiety, which he identifies as a trigger for his alcohol use, and Zofran intermittently for nausea. Recently started seeing a therapist for social anxiety.'    PMH:    has a past medical history of social anxiety disorder (1998).  History of alcohol use disorder    SMH:    has no past surgical history on file.     Medications:   escitalopram  ondansetron  rosuvastatin  sodium chloride (PF)     - reports a history of heavy alcohol consumption for about 20 years, typically consuming 8 to 10 shots daily, starting in the morning. Reports he has been hiding his alcohol use from his family for 20 years.  - Experienced a brief period of sobriety for about a month, which was the longest in years, but has since resumed drinking.  - No history of IV or intranasal drug use.  - Works at [employer] on [address]. He lives with his wife.    Previous work-up:   Lab Results   Component Value Date    HEPBANG Nonreactive 04/11/2025    HBCAB Nonreactive 04/11/2025    AUSAB 28.10 04/11/2025    HCVAB Nonreactive 04/11/2025    JENNIFER 1,436 (H) 04/11/2025    IRONSAT 65 (H) 04/11/2025    CHOL 169 05/22/2025    HDL 77 05/22/2025    LDL 72 05/22/2025    TRIG 99 05/22/2025    A1C 4.4 04/11/2025      Recent Labs   Lab Test 05/22/25  1433 04/11/25  1013 03/16/25  1639   ALKPHOS 69 137 120   ALT 21 116* 273*   AST 46* 177* 474*   BILITOTAL 1.5* 3.5* 3.0*             Allergies:     Allergies   Allergen Reactions     Pcn [Penicillins]      As child, pt does not know reaction            Social History:     Social History     Socioeconomic History     Marital status: Single      Spouse name: Not on file     Number of children: 0     Years of education: Not on file     Highest education level: Not on file   Occupational History     Occupation:      Employer: [employer]   Tobacco Use     Smoking status: Never     Smokeless tobacco: Never   Vaping Use     Vaping status: Never Used   Substance and Sexual Activity     Alcohol use: Yes     Comment: 2+ drinks weekly     Drug use: No     Sexual activity: Yes     Partners: Female   Other Topics Concern     Parent/sibling w/ CABG, MI or angioplasty before 65F 55M? No   Social History Narrative     Not on file     Social Drivers of Health     Financial Resource Strain: Low Risk  (4/9/2025)    Financial Resource Strain      Within the past 12 months, have you or your family members you live with been unable to get utilities (heat, electricity) when it was really needed?: No   Food Insecurity: Low Risk  (4/9/2025)    Food Insecurity      Within the past 12 months, did you worry that your food would run out before you got money to buy more?: No      Within the past 12 months, did the food you bought just not last and you didn t have money to get more?: No   Transportation Needs: Low Risk  (4/9/2025)    Transportation Needs      Within the past 12 months, has lack of transportation kept you from medical appointments, getting your medicines, non-medical meetings or appointments, work, or from getting things that you need?: No   Physical Activity: Insufficiently Active (4/9/2025)    Exercise Vital Sign      Days of Exercise per Week: 3 days      Minutes of Exercise per Session: 30 min   Stress: Stress Concern Present (4/9/2025)    [employer] [place] [employer] - Occupational Stress Questionnaire      Feeling of Stress : Rather much   Social Connections: Unknown (4/9/2025)    Social Connection and Isolation Panel [NHANES]      Frequency of Communication with Friends and Family: Not on file      Frequency of Social  Gatherings with Friends and Family: Once a week      Attends Buddhism Services: Not on file      Active Member of Clubs or Organizations: Not on file      Attends Club or Organization Meetings: Not on file      Marital Status: Not on file   Interpersonal Safety: Low Risk  (2025)    Interpersonal Safety      Do you feel physically and emotionally safe where you currently live?: Yes      Within the past 12 months, have you been hit, slapped, kicked or otherwise physically hurt by someone?: No      Within the past 12 months, have you been humiliated or emotionally abused in other ways by your partner or ex-partner?: No   Housing Stability: Low Risk  (2025)    Housing Stability      Do you have housing? : Yes      Are you worried about losing your housing?: No            Family History:     Family History   Problem Relation Age of Onset     Hypertension Mother      EYE* Mother         Cataracts      Hypertension Father      EYE* Father         Cataracts      Arrhythmia Father      Cancer Maternal Grandmother      Colon Cancer Maternal Grandmother          at 66     Hypertension Maternal Uncle      Diabetes Maternal Uncle      Myocardial Infarction Maternal Grandfather          at 83     Arrhythmia Maternal Grandfather      Hypertension Maternal Grandfather      Myocardial Infarction Paternal Grandmother          72     Kidney Disease Paternal Grandfather      Cancer Paternal Uncle      Cancer Paternal Aunt             Review of Systems:     Gen: See HPI     HEENT: No change in vision or hearing, mouth sores, dysphagia, lymph nodes  Resp: No shortness of breath, coughing, hx of asthma  CV: No chest pain, palpitations, syncope   GI: See HPI  : No dysuria, history of stones, urine color    Skin: No rash; no pruritus or psoriasis  MS: No arthralgias, myalgias, joint swelling  Neuro: No memory changes, confusion, numbness    Heme: No difficulty clotting, bruising, bleeding  Psych:  No anxiety,  "depression, agitation          Physical Exam:     VS: /84   Pulse 101   Resp 20   Ht 1.829 m (6')   Wt 94 kg (207 lb 3.2 oz)   SpO2 96%   BMI 28.10 kg/m      Gen: A&Ox3, NAD, well developed  HEENT: non-icteric   CV: RRR, no overt murmurs  Lung: CTA Bilatererally, no wheezing or crackles.   Lym- no palpable lymphadenopathy  Abd: soft, NT, ND, no palpable splenomegaly, liver is not palpable.   Ext: no edema, intact pulses.   Skin: No rash, no palmar erythema, telangiectasias or jaundice  Neuro: grossly intact, no asterixis   Psych: appropriate mood and affects         Data:   Reviewed in person and significant for:    Lab Results   Component Value Date     05/22/2025      Lab Results   Component Value Date    POTASSIUM 2.7 05/22/2025     Lab Results   Component Value Date    CHLORIDE 89 05/22/2025     Lab Results   Component Value Date    CO2 24 05/22/2025     Lab Results   Component Value Date    BUN 14 05/22/2025     Lab Results   Component Value Date    CR 1.00 05/22/2025       Lab Results   Component Value Date    WBC 5.6 05/22/2025     Lab Results   Component Value Date    HGB 13.7 05/22/2025     Lab Results   Component Value Date    HCT 37.9 05/22/2025     Lab Results   Component Value Date    MCV 92 05/22/2025     Lab Results   Component Value Date    PLT 68 05/22/2025       Lab Results   Component Value Date    AST 46 05/22/2025     Lab Results   Component Value Date    ALT 21 05/22/2025     No results found for: \"BILICONJ\"   Lab Results   Component Value Date    BILITOTAL 1.5 05/22/2025       Lab Results   Component Value Date    ALBUMIN 4.0 05/22/2025     Lab Results   Component Value Date    PROTTOTAL 6.8 05/22/2025      Lab Results   Component Value Date    ALKPHOS 69 05/22/2025       Lab Results   Component Value Date    INR 1.18 05/22/2025         Imaging:        EXAM: CT ABDOMEN PELVIS W CONTRAST  LOCATION: [facility]  DATE: 5/22/2025     INDICATION: Fever. " Nausea and vomiting. Diarrhea. Abdominal pain. Elevated liver function tests.  COMPARISON: None.  TECHNIQUE: CT scan of the abdomen and pelvis was performed following injection of IV contrast. Multiplanar reformats were obtained. Dose reduction techniques were used.  CONTRAST: 107 mL Isovue 370.     FINDINGS:   LOWER CHEST: Unremarkable.     HEPATOBILIARY: Marked hepatic steatosis. No hepatic masses. No calcified gallstones. No evidence for biliary dilatation.     PANCREAS: Normal.     SPLEEN: The spleen is enlarged, measuring 13.9 cm in length.     ADRENAL GLANDS: Normal.     KIDNEYS/BLADDER: No significant mass, stone, or hydronephrosis.     BOWEL: Mild bowel wall thickening in the ascending, transverse, and descending colon suggests a nonspecific colitis. There is also mild bowel wall thickening involving several loops of terminal ileum, suggesting enteritis. No bowel obstruction.   Unremarkable appendix.     LYMPH NODES: No lymphadenopathy.     VASCULATURE: Unremarkable.     PELVIC ORGANS: Trace amount of nonspecific free fluid in the pelvis.     MUSCULOSKELETAL: Nondisplaced fracture of the right 10th rib posteriorly is age-indeterminate. There are additional old right lower rib fractures.                                                                      IMPRESSION:   1.  Mild bowel wall thickening involving the ascending, transverse, and descending colon, as well as several loops of terminal ileum, suggesting a nonspecific enterocolitis.  2.  Marked hepatic steatosis.  3.  Splenomegaly.  4.  Nondisplaced fracture of the right 10th rib posteriorly is age-indeterminate.    Again, thank you for allowing me to participate in the care of your patient.        Sincerely,        Peri Bauer PA-C    Electronically signed

## 2025-05-29 NOTE — NURSING NOTE
Chief Complaint   Patient presents with    New Patient     Alcohol use disorder in remission  Elevated liver enzymes  Hyperbilirubinemia  Elevated ferritin       Vitals:    05/29/25 1222   BP: 117/84   Pulse: 101   Resp: 20   SpO2: 96%   Weight: 94 kg (207 lb 3.2 oz)   Height: 1.829 m (6')       Body mass index is 28.1 kg/m .      Jim Sheehan CMA

## 2025-05-29 NOTE — TELEPHONE ENCOUNTER
RECORDS STATUS - ALL OTHER DIAGNOSIS      RECORDS RECEIVED FROM: [facility] - Internal records   DATE RECEIVED: 5/29

## 2025-06-04 ENCOUNTER — PRE VISIT (OUTPATIENT)
Dept: ONCOLOGY | Facility: CLINIC | Age: 55
End: 2025-06-04
Payer: COMMERCIAL

## 2025-06-08 RX ORDER — OMEPRAZOLE 20 MG/1
20 CAPSULE, DELAYED RELEASE ORAL 2 TIMES DAILY
Qty: 60 CAPSULE | Refills: 0 | Status: SHIPPED | OUTPATIENT
Start: 2025-06-08

## 2025-06-08 RX ORDER — ROSUVASTATIN CALCIUM 5 MG/1
5 TABLET, COATED ORAL DAILY
Qty: 90 TABLET | Refills: 1 | Status: SHIPPED | OUTPATIENT
Start: 2025-06-08

## 2025-07-09 ENCOUNTER — MYC MEDICAL ADVICE (OUTPATIENT)
Dept: FAMILY MEDICINE | Facility: CLINIC | Age: 55
End: 2025-07-09

## 2025-07-09 NOTE — TELEPHONE ENCOUNTER
Forms/Letter Request    Type of form/letter: FMLA     Is Release of Information needed?: No    Do we have the form/letter: Yes: In care team 3, provider's form folder.     Who is the form from? Patient    Where did/will the form come from? form was sent via [employer]    When is form/letter needed by: n/a    How would you like the form/letter returned: [employer]

## 2025-07-10 NOTE — TELEPHONE ENCOUNTER
I need appointment for form completion and can discuss alcohol use disorder treatment too at that time. Help squeeze him in soon.     Thanks!   Efrain

## 2025-07-13 ASSESSMENT — PATIENT HEALTH QUESTIONNAIRE - PHQ9
10. IF YOU CHECKED OFF ANY PROBLEMS, HOW DIFFICULT HAVE THESE PROBLEMS MADE IT FOR YOU TO DO YOUR WORK, TAKE CARE OF THINGS AT HOME, OR GET ALONG WITH OTHER PEOPLE: VERY DIFFICULT
SUM OF ALL RESPONSES TO PHQ QUESTIONS 1-9: 13
SUM OF ALL RESPONSES TO PHQ QUESTIONS 1-9: 13

## 2025-07-14 ENCOUNTER — VIRTUAL VISIT (OUTPATIENT)
Dept: FAMILY MEDICINE | Facility: CLINIC | Age: 55
End: 2025-07-14
Payer: COMMERCIAL

## 2025-07-14 DIAGNOSIS — F10.90 ALCOHOL USE DISORDER: Primary | ICD-10-CM

## 2025-07-14 DIAGNOSIS — R19.5 CHANGE IN STOOL: ICD-10-CM

## 2025-07-14 DIAGNOSIS — F40.10 SOCIAL ANXIETY DISORDER: ICD-10-CM

## 2025-07-14 DIAGNOSIS — K70.30 ALCOHOLIC CIRRHOSIS OF LIVER WITHOUT ASCITES (H): ICD-10-CM

## 2025-07-14 DIAGNOSIS — F33.1 MODERATE RECURRENT MAJOR DEPRESSION (H): ICD-10-CM

## 2025-07-14 DIAGNOSIS — Z12.11 SCREEN FOR COLON CANCER: ICD-10-CM

## 2025-07-14 PROCEDURE — 98006 SYNCH AUDIO-VIDEO EST MOD 30: CPT | Performed by: STUDENT IN AN ORGANIZED HEALTH CARE EDUCATION/TRAINING PROGRAM

## 2025-07-14 RX ORDER — ESCITALOPRAM OXALATE 20 MG/1
20 TABLET ORAL DAILY
Qty: 90 TABLET | Refills: 0 | Status: SHIPPED | OUTPATIENT
Start: 2025-07-14

## 2025-07-14 RX ORDER — GABAPENTIN 300 MG/1
300 CAPSULE ORAL 3 TIMES DAILY
Qty: 270 CAPSULE | Refills: 0 | Status: SHIPPED | OUTPATIENT
Start: 2025-07-14 | End: 2025-10-12

## 2025-07-14 ASSESSMENT — ANXIETY QUESTIONNAIRES
2. NOT BEING ABLE TO STOP OR CONTROL WORRYING: MORE THAN HALF THE DAYS
7. FEELING AFRAID AS IF SOMETHING AWFUL MIGHT HAPPEN: SEVERAL DAYS
GAD7 TOTAL SCORE: 11
GAD7 TOTAL SCORE: 11
7. FEELING AFRAID AS IF SOMETHING AWFUL MIGHT HAPPEN: SEVERAL DAYS
GAD7 TOTAL SCORE: 11
4. TROUBLE RELAXING: MORE THAN HALF THE DAYS
3. WORRYING TOO MUCH ABOUT DIFFERENT THINGS: MORE THAN HALF THE DAYS
8. IF YOU CHECKED OFF ANY PROBLEMS, HOW DIFFICULT HAVE THESE MADE IT FOR YOU TO DO YOUR WORK, TAKE CARE OF THINGS AT HOME, OR GET ALONG WITH OTHER PEOPLE?: VERY DIFFICULT
6. BECOMING EASILY ANNOYED OR IRRITABLE: SEVERAL DAYS
5. BEING SO RESTLESS THAT IT IS HARD TO SIT STILL: SEVERAL DAYS
IF YOU CHECKED OFF ANY PROBLEMS ON THIS QUESTIONNAIRE, HOW DIFFICULT HAVE THESE PROBLEMS MADE IT FOR YOU TO DO YOUR WORK, TAKE CARE OF THINGS AT HOME, OR GET ALONG WITH OTHER PEOPLE: VERY DIFFICULT
1. FEELING NERVOUS, ANXIOUS, OR ON EDGE: MORE THAN HALF THE DAYS

## 2025-07-14 NOTE — PROGRESS NOTES
Anatoliy is a 55 year old who is being evaluated via a billable video visit.    How would you like to obtain your AVS? MyChart  If the video visit is dropped, the invitation should be resent by: Send to e-mail at: [email or web address]  Will anyone else be joining your video visit? No      Assessment & Plan     Alcohol use disorder  - Care coordination referral placed to discuss outpatient alcohol use disorder treatment. I can then fill out FMLA forms outlined appropriate time away from work (will meet again on Thursday to fill this out).   - Discussed alcohol cessation medications. Opted to further titrate gabapentin as per below. Discussed again side effects. Should aim to stop drinking as we increase dose as increases risk of side effect. To closely monitor with follow-up every 1-2 weeks. Next follow-up this Thursday.    How to titrate gabapentin (neurontin)  100 mg three times daily for 3 days  100mg AM, 100mg mid-day, 300 mg at bedtime for 3 days  300 mg AM, 100mg mid-day, 300mg at bedtime for 3 days  300 mg three times daily thereafter      - Primary Care - Care Coordination Referral; Future  - gabapentin (NEURONTIN) 300 MG capsule; Take 1 capsule (300 mg) by mouth 3 times daily.    Moderate recurrent major depression (H)  Social anxiety disorder  - Increase lexapro to 20mg daily.   - Further titrate gabapentin as outlined above.   - Follow-up every 1-2 weeks.     - gabapentin (NEURONTIN) 300 MG capsule; Take 1 capsule (300 mg) by mouth 3 times daily.  - escitalopram (LEXAPRO) 20 MG tablet; Take 1 tablet (20 mg) by mouth daily.    Alcoholic cirrhosis of liver without ascites (H)  - Discussed importance of alcohol cessation - plan outlined above.  - Continue care with hepatology team for alcoholic cirrhosis including getting further lab work ordered by hepatology.     Change in stool  - Persistent loose stools with no red flag symptoms like fever, chills, melena, hematochezia, or abdominal pain. Proceed with  stool testing per hepatology team.     Variceal screening per hepatology   Screen for colon cancer  - Advised him to schedule EGD and colonoscopy for variceal and colon cancer screening in near future.     - Colonoscopy Screening  Referral; Future      The longitudinal plan of care for the diagnosis(es)/condition(s) as documented were addressed during this visit. Due to the added complexity in care, I will continue to support Anatoliy in the subsequent management and with ongoing continuity of care.      Subjective   Anatoliy is a 55 year old, presenting for the following health issues:  Forms (FMLA)      7/14/2025     1:58 PM   Additional Questions   Roomed by Татьяна DOMINGUEZ         7/14/2025   Forms   Any forms needing to be completed Yes     History of Present Illness       Reason for visit:  Relapse in drinking alcohol, seeking sign off on Family Medical Leave Act for possible time off    He eats 2-3 servings of fruits and vegetables daily.He consumes 1 sweetened beverage(s) daily.He exercises with enough effort to increase his heart rate 10 to 19 minutes per day.  He exercises with enough effort to increase his heart rate 3 or less days per week. He is missing 1 dose(s) of medications per week.  He is not taking prescribed medications regularly due to remembering to take.        Patient presents via video visit today to discuss FMLA for outpatient alcohol use disorder treatment.  He recently relapsed several weeks ago, now drinking roughly 6 alcoholic beverages per day.  He is still able to function and go to work.  He continues to have significant anxiety worse in social situations, which he does think is the underlying reason why he relapsed again.  He has been taking gabapentin for several weeks now, taking 100 mg 3 times daily, and he is not sure if it is working or not.  No noted side effects.  He continues to take Lexapro 10 mg daily.  He was recently diagnosed with alcoholic cirrhosis after seeing the  hepatology team in late May, with plans to get additional testing and follow-up in November. He has not completed this additional testing yet, which includes blood work, stool testing, and EGD. He declines any suicidal thoughts or plan or panic attack.  From a GI perspective, he has had resolution of abdominal pain, nausea, and vomiting but continues to have loose stools with no hematochezia, melena, fever, ascites, or other associated symptoms.  He is strongly interested in getting outpatient treatment set up.  Or other questions or concerns.                Objective           Vitals:  No vitals were obtained today due to virtual visit.    Physical Exam   GENERAL: alert and no distress  EYES: Eyes grossly normal to inspection.  No discharge or erythema, or obvious scleral/conjunctival abnormalities.  RESP: No audible wheeze, cough, or visible cyanosis.    SKIN: Visible skin clear. No significant rash, abnormal pigmentation or lesions.  NEURO: Cranial nerves grossly intact.  Mentation and speech appropriate for age.  PSYCH: Appropriate affect, tone, and pace of words          Video-Visit Details    Type of service:  Video Visit   Originating Location (pt. Location): Home    Distant Location (provider location):  On-site  Platform used for Video Visit: Flako  Signed Electronically by: Efrain Posey PA-C

## 2025-07-15 ENCOUNTER — PATIENT OUTREACH (OUTPATIENT)
Dept: CARE COORDINATION | Facility: CLINIC | Age: 55
End: 2025-07-15
Payer: COMMERCIAL

## 2025-07-15 NOTE — PROGRESS NOTES
Clinic Care Coordination Contact  [facility]/Voicemail    Clinical Data: Care Coordinator Outreach    Outreach Documentation Number of Outreach Attempt   7/15/2025   3:47 PM 1       Left message on patient's voicemail with call back information and requested return call.      Plan: Care Coordinator will try to reach patient again in 1-2 business days.    ARISTIDES Ratliff  Social Work Care Coordinator  [facility]  [email or web address]  Office: [phone]  Gender pronouns: she/her

## 2025-07-16 ENCOUNTER — PATIENT OUTREACH (OUTPATIENT)
Dept: CARE COORDINATION | Facility: CLINIC | Age: 55
End: 2025-07-16
Payer: COMMERCIAL

## 2025-07-16 ENCOUNTER — TELEPHONE (OUTPATIENT)
Dept: BEHAVIORAL HEALTH | Facility: CLINIC | Age: 55
End: 2025-07-16

## 2025-07-16 ASSESSMENT — ACTIVITIES OF DAILY LIVING (ADL): DEPENDENT_IADLS:: INDEPENDENT

## 2025-07-16 NOTE — PROGRESS NOTES
Clinic Care Coordination Contact  [facility]/Voicemail    Clinical Data: Care Coordinator Outreach    Outreach Documentation Number of Outreach Attempt   7/15/2025   3:47 PM 1   7/16/2025   1:11 PM 2       Left message on patient's voicemail with call back information and requested return call.      Plan: Care Coordinator will send unable to contact letter with care coordinator contact information via [employer]. Care Coordinator will do no further outreaches at this time.    Daniela Dumont [facility]  Social Work Care Coordinator  [facility]  [email or web address]  Office: [phone]  Gender pronouns: she/her

## 2025-07-16 NOTE — LETTER
[facility] CARE COORDINATION  [address]  [place] [address]    July 17, 2025    Anatoliy Boykin  [address] [place] [id number]      Dear Anatoliy,    I am a clinic care coordinator who works with Efrain Posey PA-C with the [facility]. I wanted to thank you for spending the time to talk with me.  Below is a description of clinic care coordination and how I can further assist you.       The clinic care coordination team is made up of a registered nurse, , financial resource worker and community health worker who understand the health care system. The goal of clinic care coordination is to help you manage your health and improve access to the health care system. Our team works alongside your provider to assist you in determining your health and social needs. We can help you obtain health care and community resources, providing you with necessary information and education. We can work with you through any barriers and develop a care plan that helps coordinate and strengthen the communication between you and your care team.  Our services are voluntary and are offered without charge to you personally.    Please feel free to contact me with any questions or concerns regarding care coordination and what we can offer.      We are focused on providing you with the highest-quality healthcare experience possible.    Sincerely,     Daniela Dumont [facility]  Social Work Care Coordinator  [facility]  [email or web address]  Office: [phone]  Gender pronouns: she/her

## 2025-07-16 NOTE — LETTER
[facility]ARE COORDIN[facility]  [address]  [place] [address]    July 16, 2025    Anatoliy Boykin  [address] [place] [id number]      Dear Anatoliy,    I have been attempting to reach you since our last contact. I would like to continue to work with you and provide any additional support you may need on achieving your health care related goals. I would appreciate if you would give me a call at [phone] to let me know if you would like to continue working together. I know that there are many things that can affect our ability to communicate and I hope we can continue to work together.    We are focused on providing you with the highest-quality healthcare experience possible.    Sincerely,    Daniela Dumont [facility]  Social Work Care Coordinator  [facility]  [email or web address]  Office: [phone]  Gender pronouns: she/her

## 2025-07-16 NOTE — LETTER
[facility]  Patient Centered Plan of Care  About Me:        Patient Name:  Anatoliy Boykin    YOB: 1970  Age:         55 year old   [place] MRN:    [id number] Telephone Information:  Home Phone [phone]   Mobile [phone]       Address:  [address]  [facility] [address] Email address:  [email or web address]      Emergency Contact(s)    Name Relationship Lgl Grd Work Phone Home Phone Mobile Phone   1. GAGE BOYKIN* Spouse    [phone]   2. PUNEET BOYKIN* Father   [phone]            Primary language:  English     needed? No   [employer]:  [phone] op. 1  Other communication barriers:No data recorded  Preferred Method of Communication:  Hannah  Current living arrangement: I live in a private home    Mobility Status/ Medical Equipment: Independent        Health Maintenance  Health Maintenance Reviewed: Due/Overdue   Health Maintenance Due   Topic Date Due    COLORECTAL CANCER SCREENING  Never done    ZOSTER VACCINE (1 of 2) Never done    COVID-19 VACCINE (4 - 2024-25 season) 09/01/2024           My Access Plan  Medical Emergency 911   Primary Clinic Line [facility] [phone]   24 Hour Appointment Line [phone] or  [address] [place] [address] [phone]) (toll-free)   24 Hour Nurse Line [phone] (toll-free)   Preferred Urgent Care No data recorded   Preferred Hospital No data recorded   [id number] [place], [place] - [address]     Behavioral Health Crisis Line The National Suicide Prevention Lifeline at 1-461.540.9993 or Text/Call 118           My Care Team Members  Patient Care Team         Relationship Specialty Notifications Start End    Efrain Posey PA-C PCP - General Physician Assistant - Medical  4/11/25     Phone: [phone] Fax: [phone] [address],  [place] [address]    Kareem Lakhani  MD [facility]  12/19/16      NO INFO AVAILABLE 5/31/2023    Aby Murphy APRN CNP Nurse Practitioner Dermatology  8/16/24     Phone: [phone] Fax: [phone]         [address] [facility] [address]    Aubrie Schreiber PA-C Physician Assistant Allergy & Immunology  8/16/24     Phone: [phone] Fax: [phone]         [address] [facility] [address]    Nataliia Reed MD Referring Physician Family Medicine  8/16/24     Referred to Allergy    Phone: [phone] Fax: [phone]         [address] [place] [address]    Garret Conklin MD MD Dermatology  8/16/24     Phone: [phone] Fax: [phone]         [address] [facility] [address]    Garrick Ya MD MD Dermatology  8/16/24     Phone: [phone]          [address] [facility] [address]    Giuseppe Giles APRN CNP Nurse Practitioner Sleep Medicine  8/16/24     Phone: [phone] Fax: [phone]         [address] [place] [address] [address] [facility] [address]    Efrain Posey PA-C Assigned PCP   4/23/25     Phone: [phone] Fax: [phone] [address] [facility] [address] [place] [address]    Moises Everett MD MD Hematology & Oncology  5/27/25     Phone: [phone] Fax: [phone] [address] [facility] [address] [facility] [address]    Adela Asif PA-C Physician Assistant Family Medicine  5/27/25     Phone: [phone] Fax: [phone] [address] [facility] [address]    Peri Bauer PA-C Assigned Gastroenterology Provider   6/23/25     Phone: [phone] Fax: [phone]         [address] [facility] [address]    Daniela Dumont LICSW Lead Care Coordinator  Admissions 7/15/25                 My Care Plans  Self Management and Treatment Plan    Care Plan  Care Plan: Substance Use       Problem: Substance Use       Goal: Improve substance use status by going to treatment within 3 months.       Start Date: 7/16/2025 Expected End  Date: 10/17/2025    This Visit's Progress: 30%    Priority: High    Note:     Barriers: Work/Time  Strengths: Motivated and willing to accept Care Coordination  Patient expressed understanding of goal: Yes  Action steps to achieve this goal:  I will attend my scheduled Substance Use Disorder Evaluation on Tuesday, July 29th, at 5 pm. I will arrive at 4:30 pm.   I will go to a Substance Use Disorder Walk-in center if needed prior to my scheduled appointment. List of Walk-in centers were sent via [employer] by Pat Steele.   I will receive recommendations on what treatment option is recommended for me.  I will start attending treatment.   I will reach out to Care Coordinator with questions and concerns.                                 Action Plans on File:                       Advance Care Plans/Directives:             My Medical and Care Information  Problem List   Patient Active Problem List   Diagnosis    LOWELL (generalized anxiety disorder)    CARDIOVASCULAR SCREENING; LDL GOAL LESS THAN 160    Performance anxiety    Alcohol use disorder in remission    Elevated blood pressure reading without diagnosis of hypertension    Thrombocytopenia    Elevated liver enzymes    Social anxiety disorder    Moderate recurrent major depression (H)    Alcoholic cirrhosis of liver without ascites (H)      Current Medications:  Please refer to the most recent medication list provided to you by your medical team and reach out to your provider with any questions or to make any corrections.    Care Coordination Start Date: 7/16/2025   Frequency of Care Coordination: 2 weeks, more frequently as needed     Form Last Updated: 07/17/2025

## 2025-07-17 ENCOUNTER — VIRTUAL VISIT (OUTPATIENT)
Dept: FAMILY MEDICINE | Facility: CLINIC | Age: 55
End: 2025-07-17
Payer: COMMERCIAL

## 2025-07-17 DIAGNOSIS — F40.10 SOCIAL ANXIETY DISORDER: ICD-10-CM

## 2025-07-17 DIAGNOSIS — F10.91 ALCOHOL USE DISORDER IN REMISSION: Primary | ICD-10-CM

## 2025-07-17 DIAGNOSIS — K70.30 ALCOHOLIC CIRRHOSIS OF LIVER WITHOUT ASCITES (H): ICD-10-CM

## 2025-07-17 DIAGNOSIS — F33.1 MODERATE RECURRENT MAJOR DEPRESSION (H): ICD-10-CM

## 2025-07-17 NOTE — PROGRESS NOTES
Clinic Care Coordination Contact  Clinic Care Coordination Contact  OUTREACH    Referral Information:  Referral Source: PCP    Chief Complaint   Patient presents with    Clinic Care Coordination - Initial     IMANI COLON completed two attempts to reach patient. IMANI COLON then received an incoming call/voicemail from patient. Patient requested a call back. IMANI COLON then called patient who answered phone. Patient voiced he has time to talk. During phone call patient was content and showed no signs of distress.    Patient voiced he's doing okay, but struggling with addiction. Patient voiced it has been interfering with family, work, everything. Patient voiced that he is working with work and PCP on getting FMLA so he can get treatment. His first priority is to go to treatment, but also have job security so he can go back once treatment is over. Patient did voice that he has never been to treatment before.    IMANI COLON discussed next steps including getting a chemical health assessment/DEBI Eval to figure out the best treatment option. IMANI COLON explained that [facility] has a Mental Health line who can assist with getting this scheduled. Patient agreed. IMANI COLON assisted patient in calling Mental Health line to get DEBI Eval scheduled. IMANI COLON and patient talked to Ailyn who assisted patient. Ailyn stated in phone call that the earliest appointment is next Wednesday. Patient voiced that next week doesn't work at all. Patient did schedule DEBI Eval for 7/29 at 5:00 pm. Ailyn discussed working with his insurance on coverage and also discussed directions. Patient is aware and familiar with the area since he works [place] there. Patient did voice concerns with appointment being so far out since he is supposed to let PCP know what his treatment plan is. Ailyn did discuss option for DEBI walk-in clinics. Patient requested information via [employer].     Patient voiced no other questions or concerns at this time. Patient voiced he will notify PCP of this  information during his PCP appointment on 7/17/25. IMANI COLON also provided Mental Health Line to patient incase he needs to reschedule appointment or has further questions about appointment. Patient wrote down number.    Patient voiced no other resources needs at this time. Patient voiced no questions or concerns at this time. Patient thanked IMANI COLON for the call and spending the time to get that appointment scheduled.     IMANI COLON will follow-up with patient in 2 weeks after DEBI Eval. IMANI COLON remains available as needed.      Universal Utilization: See information below      Utilization      No Show Count (past year)  1             ED Visits  0             Hospital Admissions  0                    Current as of: 7/16/2025  6:10 PM                Clinical Concerns:  Current Medical Concerns:  Concerns with drinking and needing treatment.     Current Behavioral Concerns: Concerns with drinking and needing treatment.     Education Provided to patient: IMANI COLON discussed next steps on getting a chemical health assessment/substance use disorder evaluation to assist with figuring out treatment options.      Health Maintenance Reviewed: Due/Overdue   Health Maintenance Due   Topic Date Due    COLORECTAL CANCER SCREENING  Never done    ZOSTER VACCINE (1 of 2) Never done    COVID-19 VACCINE (4 - 2024-25 season) 09/01/2024       Clinical Pathway: None    Medication Management:  Medication review status: Medication not discussed at this time.   Current Outpatient Medications   Medication Sig Dispense Refill    escitalopram (LEXAPRO) 10 MG tablet Take 1 tablet (10 mg) by mouth daily. 90 tablet 3    escitalopram (LEXAPRO) 20 MG tablet Take 1 tablet (20 mg) by mouth daily. 90 tablet 0    gabapentin (NEURONTIN) 100 MG capsule Take 1 capsule (100 mg) by mouth 3 times daily. 270 capsule 0    gabapentin (NEURONTIN) 300 MG capsule Take 1 capsule (300 mg) by mouth 3 times daily. 270 capsule 0    omeprazole (PRILOSEC) 20 MG DR capsule Take 1 capsule  (20 mg) by mouth 2 times daily. 60 capsule 0    ondansetron (ZOFRAN ODT) 4 MG ODT tab Take 1 tablet (4 mg) by mouth every 8 hours as needed for nausea. 12 tablet 0    rosuvastatin (CRESTOR) 5 MG tablet Take 1 tablet (5 mg) by mouth daily. 90 tablet 1     Current Facility-Administered Medications   Medication Dose Route Frequency Provider Last Rate Last Admin    sodium chloride (PF) 0.9% PF flush 3 mL  3 mL Intravenous q1 min prn             Functional Status:  Dependent ADLs:: Independent  Dependent IADLs:: Independent  Mobility Status: Independent    Living Situation:  Current living arrangement:: I live in a private home    Lifestyle & Psychosocial Needs:    Social Drivers of Health     Food Insecurity: Low Risk  (4/9/2025)    Food Insecurity     Within the past 12 months, did you worry that your food would run out before you got money to buy more?: No     Within the past 12 months, did the food you bought just not last and you didn t have money to get more?: No   Depression: At risk (7/14/2025)    PHQ-2     PHQ-2 Score: 4   Housing Stability: Low Risk  (4/9/2025)    Housing Stability     Do you have housing? : Yes     Are you worried about losing your housing?: No   Tobacco Use: Low Risk  (7/14/2025)    Patient History     Smoking Tobacco Use: Never     Smokeless Tobacco Use: Never     Passive Exposure: Not on file   Financial Resource Strain: Low Risk  (4/9/2025)    Financial Resource Strain     Within the past 12 months, have you or your family members you live with been unable to get utilities (heat, electricity) when it was really needed?: No   Alcohol Use: Not on file   Transportation Needs: Low Risk  (4/9/2025)    Transportation Needs     Within the past 12 months, has lack of transportation kept you from medical appointments, getting your medicines, non-medical meetings or appointments, work, or from getting things that you need?: No   Physical Activity: Insufficiently Active (4/9/2025)    Exercise Vital  Sign     Days of Exercise per Week: 3 days     Minutes of Exercise per Session: 30 min   Interpersonal Safety: Low Risk  (4/11/2025)    Interpersonal Safety     Do you feel physically and emotionally safe where you currently live?: Yes     Within the past 12 months, have you been hit, slapped, kicked or otherwise physically hurt by someone?: No     Within the past 12 months, have you been humiliated or emotionally abused in other ways by your partner or ex-partner?: No   Stress: Stress Concern Present (4/9/2025)    [place] of Occupational [employer] - Occupational Stress Questionnaire     Feeling of Stress : Rather much   Social Connections: Unknown (4/9/2025)    Social Connection and Isolation Panel [NHANES]     Frequency of Communication with Friends and Family: Not on file     Frequency of Social Gatherings with Friends and Family: Once a week     Attends Islam Services: Not on file     Active Member of Clubs or Organizations: Not on file     Attends Club or Organization Meetings: Not on file     Marital Status: Not on file   Health Literacy: Not on file       Chemical Dependency Status: Current Concern        Resources and Interventions:  Current Resources:      Community Resources: None  Supplies Currently Used at Home: None  Equipment Currently Used at Home: none  Employment Status: employed full-time       Referrals Placed: Mental Health     Care Plan:  Care Plan: Substance Use       Problem: Substance Use       Goal: Improve substance use status by going to treatment within 3 months.       Start Date: 7/16/2025 Expected End Date: 10/17/2025    This Visit's Progress: 30%    Priority: High    Note:     Barriers: Work/Time  Strengths: Motivated and willing to accept Care Coordination  Patient expressed understanding of goal: Yes  Action steps to achieve this goal:  I will attend my scheduled Substance Use Disorder Evaluation on Tuesday, July 29th, at 5 pm. I will arrive at 4:30 pm.   I will go to a  Substance Use Disorder Walk-in center if needed prior to my scheduled appointment. List of Walk-in centers were sent via [employer] by Pat Steele.   I will receive recommendations on what treatment option is recommended for me.  I will start attending treatment.   I will reach out to Care Coordinator with questions and concerns.                                 Patient/Caregiver understanding: Patient reports understanding and denies any additional questions or concerns at this times. SW CC engaged in AIDET communication during encounter.      Outreach Frequency: 2 weeks, more frequently as needed  Future Appointments                Today [facility]    In 1 week Scott Ritchie LADC [facility]    In 3 weeks SPHP LAB [facility] Laboratory [facility]    In 4 weeks Efrain Posey PA-C [facility]             Plan: Patient will complete DEBI Eval on 7/29 at 5 pm. Patient will receive recommendations on treatment options and start going to treatment. Patient will reach out to  CC with questions and concerns.    Care Coordination will follow-up in 2 weeks and as needed.    ARISTIDES Ratliff  Social Work Care Coordinator  [facility]  [email or web address]  Office: [phone]  Gender pronouns: she/her

## 2025-07-17 NOTE — PROGRESS NOTES
"Anatoliy is a 55 year old who is being evaluated via a billable telephone visit.      Originating Location (pt. Location): Home    Distant Location (provider location):  On-site  Telephone visit completed due to the patient did not have access to video, while the distant provider did.    Assessment & Plan     FMLA Forms  Alcohol use disorder in remission  Alcoholic cirrhosis of liver without ascites (H)  Moderate recurrent major depression (H)  Social anxiety disorder  FMLA forms filled out and will be scanned/sent to patient by clinic staff. He can reach out to me if any adjustments are needed.   - Continue follow-up with hepatology team.  - Continue to work on alcohol cessation. Hoping to get into outpatient alcohol disorder treatment program soon.   - Continue with increased lexapro and gabapentin dose as previously discussed at visit.   - Follow-up on August 4th, sooner if any concerns.         The longitudinal plan of care for the diagnosis(es)/condition(s) as documented were addressed during this visit. Due to the added complexity in care, I will continue to support Anatoliy in the subsequent management and with ongoing continuity of care.    Subjective   Anatoliy is a 55 year old, presenting for the following health issues:  Forms (FMLA)      7/17/2025     9:49 AM   Additional Questions   Roomed by Татьяна DOMINGUEZ         7/17/2025   Forms   Any forms needing to be completed Yes     HPI    Here to get FMLA forms filled out.     Alcohol Use Disorder (Addiction)  - History of alcohol use disorder with relapse  - Initial visit for this condition on April 11, 2025  - Discussed with employer, not explicitly disclosed as alcohol use  - No mention of continuous long absences from work, experiences intermittent \"bad days\" and \"good days\" so would like to just specify intermittent absences for now but meeting with social work soon and may need to adjust based on treatment plan.   - Doing ok on increased gabapentin dose so far " with no side effects.     Cirrhosis of the Liver  - Diagnosis of cirrhosis of the liver  - First seen by hepatologist on May 29, 2025  - Ongoing follow-up with hepatologist    Anxiety and Depression  - History of anxiety and depression  - Doing ok with increased lexapro dose so far with no side effects.                 Objective           Vitals:  No vitals were obtained today due to virtual visit.    Physical Exam   General: Alert and no distress //Respiratory: No audible wheeze, cough, or shortness of breath // Psychiatric:  Appropriate affect, tone, and pace of words            Phone call duration: 12 minutes  Signed Electronically by: Efrain Posey PA-C

## 2025-07-17 NOTE — PROGRESS NOTES
"Anatoliy is a 55 year old who is being evaluated via a billable video visit.    How would you like to obtain your AVS? MyChart  If the video visit is dropped, the invitation should be resent by: Send to e-mail at: [email or web address]  Will anyone else be joining your video visit? No  {If patient encounters technical issues they should call [phone] :642052}    {PROVIDER CHARTING PREFERENCE:483464}    Subjective   Anatoliy is a 55 year old, presenting for the following health issues:  Forms      7/17/2025     9:49 AM   Additional Questions   Roomed by Татьяна DOMINGUEZ         7/17/2025   Forms   Any forms needing to be completed Yes     HPI      {SUPERLIST (Optional):352007}  {additonal problems for provider to add (Optional):453628}    {ROS Picklists (Optional):519018}      Objective           Vitals:  No vitals were obtained today due to virtual visit.    Physical Exam   {video visit exam brief selected:595112}    {Diagnostic Test Results (Optional):890598}      Video-Visit Details    Type of service:  Video Visit   Originating Location (pt. Location): {video visit patient location:448999::\"Home\"}  {PROVIDER LOCATION On-site should be selected for visits conducted from your clinic location or adjoining [facility] hospital, academic office, or other nearby [facility] building. Off-site should be selected for all other provider locations, including home:426050}  Distant Location (provider location):  {virtual location provider:562297}  Platform used for Video Visit: {Virtual Visit Platforms:419389::\"[employer] Express\"}  Signed Electronically by: Efrain Posey PA-C  {Email feedback regarding this note to [email or web address]   :602469}  "

## 2025-07-28 ASSESSMENT — ANXIETY QUESTIONNAIRES
1. FEELING NERVOUS, ANXIOUS, OR ON EDGE: NEARLY EVERY DAY
7. FEELING AFRAID AS IF SOMETHING AWFUL MIGHT HAPPEN: SEVERAL DAYS
8. IF YOU CHECKED OFF ANY PROBLEMS, HOW DIFFICULT HAVE THESE MADE IT FOR YOU TO DO YOUR WORK, TAKE CARE OF THINGS AT HOME, OR GET ALONG WITH OTHER PEOPLE?: VERY DIFFICULT
3. WORRYING TOO MUCH ABOUT DIFFERENT THINGS: SEVERAL DAYS
2. NOT BEING ABLE TO STOP OR CONTROL WORRYING: MORE THAN HALF THE DAYS
6. BECOMING EASILY ANNOYED OR IRRITABLE: SEVERAL DAYS
GAD7 TOTAL SCORE: 11
5. BEING SO RESTLESS THAT IT IS HARD TO SIT STILL: SEVERAL DAYS
GAD7 TOTAL SCORE: 11
7. FEELING AFRAID AS IF SOMETHING AWFUL MIGHT HAPPEN: SEVERAL DAYS
GAD7 TOTAL SCORE: 11
4. TROUBLE RELAXING: MORE THAN HALF THE DAYS
IF YOU CHECKED OFF ANY PROBLEMS ON THIS QUESTIONNAIRE, HOW DIFFICULT HAVE THESE PROBLEMS MADE IT FOR YOU TO DO YOUR WORK, TAKE CARE OF THINGS AT HOME, OR GET ALONG WITH OTHER PEOPLE: VERY DIFFICULT

## 2025-07-28 ASSESSMENT — PATIENT HEALTH QUESTIONNAIRE - PHQ9
SUM OF ALL RESPONSES TO PHQ QUESTIONS 1-9: 19
10. IF YOU CHECKED OFF ANY PROBLEMS, HOW DIFFICULT HAVE THESE PROBLEMS MADE IT FOR YOU TO DO YOUR WORK, TAKE CARE OF THINGS AT HOME, OR GET ALONG WITH OTHER PEOPLE: VERY DIFFICULT
SUM OF ALL RESPONSES TO PHQ QUESTIONS 1-9: 19

## 2025-07-29 ENCOUNTER — HOSPITAL ENCOUNTER (OUTPATIENT)
Dept: BEHAVIORAL HEALTH | Facility: CLINIC | Age: 55
Discharge: HOME | End: 2025-07-29
Attending: PSYCHIATRY & NEUROLOGY
Payer: COMMERCIAL

## 2025-07-29 VITALS — BODY MASS INDEX: 28.44 KG/M2 | WEIGHT: 210 LBS | HEIGHT: 72 IN

## 2025-07-29 DIAGNOSIS — F10.20 ALCOHOL USE DISORDER, SEVERE, DEPENDENCE (H): Primary | ICD-10-CM

## 2025-07-29 PROCEDURE — H0001 ALCOHOL AND/OR DRUG ASSESS: HCPCS

## 2025-07-29 ASSESSMENT — PAIN SCALES - GENERAL: PAINLEVEL_OUTOF10: NO PAIN (0)

## 2025-07-29 ASSESSMENT — COLUMBIA-SUICIDE SEVERITY RATING SCALE - C-SSRS
TOTAL  NUMBER OF ABORTED OR SELF INTERRUPTED ATTEMPTS LIFETIME: NO
6. HAVE YOU EVER DONE ANYTHING, STARTED TO DO ANYTHING, OR PREPARED TO DO ANYTHING TO END YOUR LIFE?: NO
2. HAVE YOU ACTUALLY HAD ANY THOUGHTS OF KILLING YOURSELF?: NO
ATTEMPT LIFETIME: NO
TOTAL  NUMBER OF INTERRUPTED ATTEMPTS LIFETIME: NO
1. HAVE YOU WISHED YOU WERE DEAD OR WISHED YOU COULD GO TO SLEEP AND NOT WAKE UP?: NO

## 2025-07-29 NOTE — PROGRESS NOTES
[facility]  Provider Name:  YAAKOV Segundo/LESLEY     Telephone: [phone]      PATIENT'S NAME: Anatoliy Boykin  PREFERRED NAME: Anatoliy  PRONOUNS: he/him/his    MRN: [id number]  : 1970  ADDRESS:   [address] [place] [id number]  E-MAIL: [email or web address]   ACCT. NUMBER:  [id number]  DATE OF SERVICE: 2025  START TIME: 4:35 pm  END TIME: 5:45 pm  PREFERRED PHONE: [phone]   May we leave a program related message: Yes  SERVICE MODALITY:  In-person:        Last 4 digits of SSN #: [id number]     EMERGENCY CONTACT:   Katie Boykin (wife)  Tel #: [phone]     Roque Boykin (father)  Tel #: [phone]     UNIVERSAL ADULT SUBSTANCE USE DISORDER DIAGNOSTIC ASSESSMENT    Identifying Information:  The patient is a 55 year old, /White male.  The patient was referred for an assessment by [facility].  The patient attended the session alone.     Chief Complaint:   The reason for seeking services at this time is:  The patient reported the reason for participating in the substance use disorder assessment today on 2025 was due to the patient's own awareness he needed help and due to the recommendation from [facility] for the patient to complete a substance use disorder assessment.  The patient reported his heaviest use of alcohol had been during the past 12 months, when he reported a pattern of drinking up to a half a liter of vodka on a daily basis.  The patient reported his longest period of time without drinking alcohol during the past 12 months had been for a 3-4 week period of time between 2025 and 2025 after he had been diagnosed with alcoholic cirrhosis of the liver without ascites in 2025.  The patient reported his relapse with alcohol had been due to having a lot of cravings to drink alcohol and due to having a desire to drink alcohol in an  attempt to self-medicate his multiple current life stressors.  The patient reported he has had several life changes within the past few years which had contributed to the patient being more stressed out, including purchasing a new home with his wife that needs a lot of work, taking care of his elder parents who are in their 80's, getting a promotion at work that has really increased his stress level at work and having health issues which are exacerbated by his use of alcohol.  The patient had been unsuccessful in his attempts to stop his use of alcohol on his own.  The patient reported he was very motivated to stop his use of alcohol, but he wished to discuss his options for completing a detoxification and for entering a substance use disorder treatment program with his wife and with his sisters prior to moving forward with a specific treatment plan.  The patient had been directed to contact the patient navigator, LESLEY Kelley, at Tel #: [phone] for any follow-up regarding today's substance use disorder assessment, including if the patient decided he would be willing to enter a substance use disorder treatment program within the next 30-days.  The patient had also been provided with the contact information for Pat Hinkle, the [facility] , tel #: [phone] in case he wished to pursue entering an intensive outpatient substance use disorder treatment program versus entering the [facility] treatment program at [facility] in [place], [place].  The patient first had a concern about having substance abuse issues in 2018.  The patient reported he had attempted to stop his use of alcohol on his own in the past, but he had been unsuccessful in his attempts to maintain abstinence from alcohol.  The patient denied having any history of participating in a substance use disorder treatment program.  The patient denied having any inpatient detoxification admissions or  inpatient hospitalizations for withdrawal symptoms.  The patient is not currently receiving any substance use disorder treatment services.  The patient denied having any history of attending 12-step or other recovery support group meetings.  The patient does not appear to be in severe withdrawal, an imminent safety risk to self or others, or requiring immediate medical attention and may proceed with the assessment interview.    Social/Family History:  The patient reported growing up in [place], [place].  The patient reported being raised by both of his biological parents in the same family home.  The patient reported having 2 full sisters.  The patient denied experiencing or witnessing any verbal, physical or sexual abuse when he was growing up in the family home.  The patient denied having any history of being teased, picked on, or bullied in any significant manner when he was a child.  The patient reported overall his childhood had been happy.  The patient reported feeling supported by all of his family members when he was growing up.  The patient reported being raised in the [employer]).  The patient described his current relationships with his family of origin as being good.      The patient describes his cultural background as being a /White male.  Cultural influences and impact on patient's life structure, values, norms, and healthcare: The patient denied cultural concerns had an impact on life structure, values, norms, or healthcare.  Contextual influences on patient's health include: Family Factors: family history includes Arrhythmia in his father and maternal grandfather; Cancer in his maternal grandmother, paternal aunt, and paternal uncle; Colon Cancer in his maternal grandmother; Diabetes in his maternal uncle; EYE* in his father and mother; Hypertension in his father, maternal grandfather, maternal uncle, and mother; Kidney Disease in his paternal grandfather; Myocardial Infarction  in his maternal grandfather and paternal grandmother; Substance Abuse in his maternal grandfather and paternal grandfather.  The patient identified his preferred language to be English.  The patient reported he does not need the assistance of an  or other support involved in therapy.  The patient reported he is not currently involved in community vidal activities.  The patient reported his spirituality would likely have no impact on his recovery.    The patient reported having no significant delays in developmental tasks.  The patient's highest education level was college graduate.  The patient identified the following learning problems: none reported.  The patient reports he is able to understand written materials.    The patient reported the following relationship history: The patient reported being  1 time and he is still  to his wife.  The patient identified as being heterosexual and he reported being  to his wife for the past 3 years.  The patient denied having any children.     The patient's current living/housing situation involves staying in own home/apartment.  The patient reported living with his wife and he reported his housing is stable.  The patient denied having any concerns regarding his immediate living environment and/or neighborhood, but he had been unable to maintain abstinence from alcohol while living there.  The patient identified his wife, his father, his mother, and his siblings as being his primary support network at this time.  The patient identified the quality of his relationships with his support network as being stable and meaningful.  The patient would like the following people involved in treatment services if recommended: None at this time.     The patient reported engaging in the following recreational/leisure activities: playing guitar, biking, reading and going hiking.  The patient reported engaging in the following recreation/leisure activities  while using alcohol or other non-prescribed mood altering chemicals: The patient's use of alcohol had been done independently of his social/recreational/leisure activities.  The patient reported the following people are supportive of his recovery: his wife, his father, his mother, and his siblings.  The patient reported he had been working full-time as a  at the [employer] since 1999.  The patient reported his income is obtained from employment, spouse, and parents.  The patient reported having financial stressors at this time, including money being somewhat tight at this time.  Cultural and socioeconomic factors do not affect the patient's access to services.    The patient denied having any substance related arrests or legal issues.  The patient denied having any history of being on court probation.    Patient's Strengths and Limitations:  The patient identified the following strengths or resources that will help him succeed in treatment: commitment to health and well being, family support, and motivation.  Things that may interfere with the patient's success in treatment include: lack of a sober peer support network, financial stressors, physical health concerns, mental health concerns, and lacks awareness regarding the risks and potential negative consequences of substance abuse.     Assessments:  The following assessments were completed by patient for this visit:  PHQ9:       12/16/2016     4:00 PM 1/27/2017     4:56 PM 10/30/2017     3:42 PM 4/11/2025     9:07 AM 5/8/2025     2:46 PM 7/13/2025     4:05 PM 7/28/2025    12:39 PM   PHQ-9 SCORE   PHQ-9 Total Score MyChart    15 (Moderately severe depression) 6 (Mild depression) 13 (Moderate depression) 19 (Moderately severe depression)   PHQ-9 Total Score 5  4  7  15  6  13  19        Patient-reported    Data saved with a previous flowsheet row definition     GAD7:       11/17/2016     3:38 PM 8/28/2017     2:00 PM 10/30/2017     3:42  PM 4/11/2025     9:08 AM 5/7/2025     1:38 PM 7/14/2025     1:17 PM 7/28/2025     1:00 PM   LOWELL-7 SCORE   Total Score    11 (moderate anxiety) 6 (mild anxiety) 11 (moderate anxiety) 11 (moderate anxiety)   Total Score 9  3 3 11  6  11  11        Patient-reported    Data saved with a previous flowsheet row definition     PROMIS 10-Global Health (all questions and answers displayed):       7/28/2025     1:01 PM   PROMIS 10   In general, would you say your health is: Poor   In general, would you say your quality of life is: Poor   In general, how would you rate your physical health? Poor   In general, how would you rate your mental health, including your mood and your ability to think? Poor   In general, how would you rate your satisfaction with your social activities and relationships? Fair   In general, please rate how well you carry out your usual social activities and roles Fair   To what extent are you able to carry out your everyday physical activities such as walking, climbing stairs, carrying groceries, or moving a chair? Mostly   In the past 7 days, how often have you been bothered by emotional problems such as feeling anxious, depressed, or irritable? Always   In the past 7 days, how would you rate your fatigue on average? Moderate   In the past 7 days, how would you rate your pain on average, where 0 means no pain, and 10 means worst imaginable pain? 2   In general, would you say your health is: 1   In general, would you say your quality of life is: 1   In general, how would you rate your physical health? 1   In general, how would you rate your mental health, including your mood and your ability to think? 1   In general, how would you rate your satisfaction with your social activities and relationships? 2   In general, please rate how well you carry out your usual social activities and roles. (This includes activities at home, at work and in your community, and responsibilities as a parent, child, spouse,  employee, friend, etc.) 2   To what extent are you able to carry out your everyday physical activities such as walking, climbing stairs, carrying groceries, or moving a chair? 4   In the past 7 days, how often have you been bothered by emotional problems such as feeling anxious, depressed, or irritable? 5   In the past 7 days, how would you rate your fatigue on average? 3   In the past 7 days, how would you rate your pain on average, where 0 means no pain, and 10 means worst imaginable pain? 2   Global Mental Health Score 5    Global Physical Health Score 12    PROMIS TOTAL - SUBSCORES 17        Patient-reported     [place] Suicide Severity Rating Scale (Lifetime/Recent)      7/29/2025     4:00 PM   [place] Suicide Severity Rating (Lifetime/Recent)   1. Wish to be Dead (Lifetime) N   2. Non-Specific Active Suicidal Thoughts (Lifetime) N   Actual Attempt (Lifetime) N   Has subject engaged in non-suicidal self-injurious behavior? (Lifetime) N   Interrupted Attempts (Lifetime) N   Aborted or Self-Interrupted Attempt (Lifetime) N   Preparatory Acts or Behavior (Lifetime) N   Calculated C-SSRS Risk Score (Lifetime/Recent) No Risk Indicated     GAIN-sliding scale:      7/29/2025     4:00 PM   When was the last time that you had significant problems...   with feeling very trapped, lonely, sad, blue, depressed or hopeless about the future? Past month   with sleep trouble, such as bad dreams, sleeping restlessly, or falling asleep during the day? Past Month   with feeling very anxious, nervous, tense, scared, panicked or like something bad was going to happen? Past month   with becoming very distressed & upset when something reminded you of the past? Past month   with thinking about ending your life or committing suicide? Never          7/29/2025     4:00 PM   When was the last time that you did the following things 2 or more times?   Lied or conned to get things you wanted or to avoid having to do something? Past month    Had a hard time paying attention at school, work or home? Past month   Had a hard time listening to instructions at school, work or home? Past month   Were a bully or threatened other people? Never   Started physical fights with other people? Never     Personal and Family Medical History:  The patient did not report a family history of mental health concerns.  The patient reported family history includes Arrhythmia in his father and maternal grandfather; Cancer in his maternal grandmother, paternal aunt, and paternal uncle; Colon Cancer in his maternal grandmother; Diabetes in his maternal uncle; EYE* in his father and mother; Hypertension in his father, maternal grandfather, maternal uncle, and mother; Kidney Disease in his paternal grandfather; Myocardial Infarction in his maternal grandfather and paternal grandmother; Substance Abuse in his maternal grandfather and paternal grandfather.     The patient reported the following previous mental health diagnoses: The patient reported a history of MDD, LOWELL, and Social anxiety disorder.  The patient reported his primary mental health symptoms include: depression, anxiety, sleep problems, symptoms related to past traumatic life events, and attentional problems and these do not impact his ability to function.  The patient has received mental health services in the past: The patient reported taking his prescribed psychotropic medications as prescribed.  The patient reported a history of working with a 1:1 mental health therapist in the past, but he denied working with a 1:1 mental health therapist at this time.  Psychiatric Hospitalizations: None.  The patient denies a history of civil commitment.  Current mental health services/providers include:  The patient reported taking his prescribed psychotropic medications as prescribed.  The patient reported a history of working with a 1:1 mental health therapist in the past, but he denied working with a 1:1 mental health  therapist at this time.    The patient has had a physical exam to rule out medical causes for current symptoms.  Date of last physical exam was within the past year. The patient was encouraged to follow up with PCP if symptoms were to develop.  The patient has a [place] [facility] Primary Care Provider, who is named Efrain Posey.  The patient reported the following medical concerns:   Past Medical History:   Diagnosis Date    Alcoholic cirrhosis of liver without ascites (H)     Elevated liver enzymes     LOWELL (generalized anxiety disorder)     MDD (major depressive disorder)     social anxiety disorder 1998   The patient reported taking his medications as prescribed and following the recommendations of his healthcare providers.  The patient denied having any current issues with pain.  The patient is male and is not pregnant.  There are significant appetite / nutritional concerns / weight changes.  The patient reported having concerns regarding having a poor appetite, not enjoying food as he had in the past and feeling like his stomach has been disrupted.  The patient does not report having a history of an eating disorder.  The patient denied having any history of a head injury, head trauma or cognitive impairment.     The patient reported current medications as:   Current Outpatient Medications   Medication Sig Dispense Refill    escitalopram (LEXAPRO) 10 MG tablet Take 1 tablet (10 mg) by mouth daily. 90 tablet 3    escitalopram (LEXAPRO) 20 MG tablet Take 1 tablet (20 mg) by mouth daily. 90 tablet 0    gabapentin (NEURONTIN) 100 MG capsule Take 1 capsule (100 mg) by mouth 3 times daily. 270 capsule 0    gabapentin (NEURONTIN) 300 MG capsule Take 1 capsule (300 mg) by mouth 3 times daily. 270 capsule 0    omeprazole (PRILOSEC) 20 MG DR capsule Take 1 capsule (20 mg) by mouth 2 times daily. 60 capsule 0    ondansetron (ZOFRAN ODT) 4 MG ODT tab Take 1 tablet (4 mg) by mouth every 8 hours as needed for nausea. 12 tablet 0     rosuvastatin (CRESTOR) 5 MG tablet Take 1 tablet (5 mg) by mouth daily. 90 tablet 1     Current Facility-Administered Medications   Medication Dose Route Frequency Provider Last Rate Last Admin    sodium chloride (PF) 0.9% PF flush 3 mL  3 mL Intravenous q1 min prn          Medication Adherence:  The patient reported taking his prescribed medications as prescribed.  The patient reported being able  to self-administer his medications.    Patient Allergies:    Allergies   Allergen Reactions    Pcn [Penicillins]      As child, pt does not know reaction     Medical History:    Past Medical History:   Diagnosis Date    Alcoholic cirrhosis of liver without ascites (H)     Elevated liver enzymes     LOWELL (generalized anxiety disorder)     MDD (major depressive disorder)     social anxiety disorder 1998      Substance Use:  The patient reported the following biological family members or relatives with chemical health issues: family history includes Substance Abuse in his maternal grandfather and paternal grandfather.  The patient denied having any history of participating in a substance use disorder treatment program.  The patient denied having any inpatient detoxification admissions or inpatient hospitalizations for withdrawal symptoms.  The patient is not currently receiving any substance use disorder treatment services.  The patient denied having any history of attending 12-step or other recovery support group meetings.      Substance Age of first use Pattern and duration of use (include amounts and frequency) Date of last use     Withdrawal potential Route of use   Has used Alcohol 17 The patient reported his heaviest use of alcohol had been during the past 12 months, when he reported a pattern of drinking up to a half a liter of vodka on a daily basis.    The patient reported his longest period of time without drinking alcohol during the past 12 months had been for a 3-4 week period of time between 4/2025 and 5/2025  after he had been diagnosed with alcoholic cirrhosis of the liver without ascites in 4/2025.  The patient reported his relapse with alcohol had been due to having a lot of cravings to drink alcohol and due to having a desire to drink alcohol in an attempt to self-medicate his multiple current life stressors.    The patient denied having any memory impairment or blackouts due to his use of alcohol within the past 12-months.   7/29/2025    (4 shots of vodka) Yes Oral   Has used Marijuana   50 The patient reported smoking THC/cannabis on 2 occasions in his life in 2020.    2020 No Smoked    Has not used Amphetamines          Has not used Cocaine/crack           Has not used Hallucinogens        Has not used Inhalants        Has not used Heroin        Has not used Other Opiates        Has used Benzodiazepines   30 The patient reported taking his prescribed Lorazepam (Ativan) as prescribed and he denied having any history of abusing or overusing his prescribed Lorazepam (Ativan).    2016 No Oral   Has not used Barbiturates        Has not used Over the counter medications        Has not used Nicotine        Has use Caffeine 7 The patient reported a current pattern of drinking 1 bottle/can of soda around 2 times per week on average.    7/27/2025  No  Oral   Has not used other substances not listed above:  Identify:           The patient reported the following problems as a result of their substance use: relationship problems, family problems, chronic health problems which were exacerbated by his use of alcohol, mental health symptoms which were exacerbated by his use of alcohol, and occupational / vocational problems.  The patient is concerned about substance use.  The patient reported his recovery goal is: The patient's plan and goal is to abstain from alcohol and from all other non-prescribed mood altering chemicals.     The patient reports experiencing the following withdrawal symptoms within the past 12 months:  shaky/jittery/tremors, unable to sleep, agitation, irritability, headache, fatigue, sad/depressed feeling, muscle aches, vivid/unpleasant dreams, sensitivity to noise, high blood pressure, nausea/vomiting, dizziness, diarrhea, diminished appetite, unable to eat, and anxiety/worry and the following within the past 30 days: none due to his ongoing daily use of alcohol. (DSM-11)  The patient reported having urges to drink alcohol.  (DSM-4)  The patient reported he has used more alcohol than intended and over a longer period of time than intended.  (DSM-1)  The patient reported he has had unsuccessful attempts to cut down or control use of alcohol.  (DSM-2)  The patient reported his longest period of time without drinking alcohol during the past 12 months had been for a 3-4 week period of time between 4/2025 and 5/2025 after he had been diagnosed with alcoholic cirrhosis of the liver without ascites in 4/2025.  The patient reported his relapse with alcohol had been due to having a lot of cravings to drink alcohol and due to having a desire to drink alcohol in an attempt to self-medicate his multiple current life stressors.  The patient reported he has needed to use more alcohol to achieve the same effect.  (DSM-10)  The patient does report diminished effect with use of same amount of alcohol.  (DSM-10)     The patient does report a great deal of time is spent in activities necessary to obtain, use, or recover from alcohol effects.  (DSM-3)  The patient does report important social, occupational, or recreational activities are given up or reduced because of alcohol use.  (DSM-7)  Alcohol use is continued despite knowledge of having a persistent or recurrent physical or psychological problem that is likely to have been caused or exacerbated by use.  (DSM-9)  The patient reported the following problem behaviors while under the influence of substances: The patient reported having relationship conflict with his wife and  being more socially isolated when under the influence of alcohol.  (DSM-6)  The patient reported recurrent use of alcohol in physically hazardous such as driving a motor vehicle while under the influence.  (DSM-8)    The patient reported his substance use has not negatively impacted his ability to function in a school setting within the past 12-months.  (DSM-5)  The patient reported his substance use has negatively impacted his ability to function in a work setting.  The patient reported having decreased performance at work due to his substance use.  (DSM-5)  The patient's demographics and history impact his recovery in the following ways: family history includes Arrhythmia in his father and maternal grandfather; Cancer in his maternal grandmother, paternal aunt, and paternal uncle; Colon Cancer in his maternal grandmother; Diabetes in his maternal uncle; EYE* in his father and mother; Hypertension in his father, maternal grandfather, maternal uncle, and mother; Kidney Disease in his paternal grandfather; Myocardial Infarction in his maternal grandfather and paternal grandmother; Substance Abuse in his maternal grandfather and paternal grandfather.  The patient reported engaging in the following recreation/leisure activities while using alcohol or other non-prescribed mood altering chemicals: The patient's use of alcohol had been done independently of his social/recreational/leisure activities.  The patient reported the following people are supportive of his recovery: his wife, his father, his mother, and his siblings.    The patient denied having current or past concerns regarding gambling and denied ever participating in a gambling treatment program.  The patient does not have other addictive behaviors he is concerned about at this time.    Dimension Scale Ratings:    Dimension 1 -  Acute Intoxication/Withdrawal: 2 - Moderate Problem  Summary to support rating:  Withdrawal Management - The patient has a  significant risk of developing withdrawal symptoms from alcohol.  If the patient were to start to experience significant withdrawal symptoms from alcohol, he had been encouraged to go to the [facility] Emergency Department or go to a different hospital's Emergency Department or Urgent Care to be evaluated for an Inpatient detoxification admission to complete a detoxification off of alcohol under medical supervision and/or to be evaluated for being prescribed medication to help him tolerate the alcohol withdrawal symptoms.    Dimension 2 - Biomedical: 2 - Moderate Problem  Summary to support rating:  The patient has had a physical exam to rule out medical causes for current symptoms.  Date of last physical exam was within the past year. The patient was encouraged to follow up with PCP if symptoms were to develop.  The patient has a [facility] Primary Care Provider, who is named Efrain Posey.  The patient reported the following medical concerns:   Past Medical History:   Diagnosis Date    Alcoholic cirrhosis of liver without ascites (H)     Elevated liver enzymes     LOWELL (generalized anxiety disorder)     MDD (major depressive disorder)     social anxiety disorder 1998   The patient reported taking his medications as prescribed and following the recommendations of his healthcare providers.  The patient denied having any current issues with pain.  The patient is male and is not pregnant.  There are significant appetite / nutritional concerns / weight changes.  The patient reported having concerns regarding having a poor appetite, not enjoying food as he had in the past and feeling like his stomach has been disrupted.  The patient does not report having a history of an eating disorder.  The patient denied having any history of a head injury, head trauma or cognitive impairment.     Dimension 3 - Emotional/Behavioral/Cognitive Conditions: 2 - Moderate Problem  Summary to support rating:  The patient  reported a history of MDD, LOWELL, and Social anxiety disorder.  The patient reported his primary mental health symptoms include: depression, anxiety, sleep problems, symptoms related to past traumatic life events, and attentional problems and these do not impact his ability to function.  The patient has received mental health services in the past: The patient reported taking his prescribed psychotropic medications as prescribed.  The patient reported a history of working with a 1:1 mental health therapist in the past, but he denied working with a 1:1 mental health therapist at this time.  Psychiatric Hospitalizations: None.  The patient denies a history of civil commitment.  Current mental health services/providers include:  The patient reported taking his prescribed psychotropic medications as prescribed.  The patient reported a history of working with a 1:1 mental health therapist in the past, but he denied working with a 1:1 mental health therapist at this time.    Dimension 4 - Readiness to Change:  3 - Severe Problem  Summary to support rating:  The patient displayed verbal compliance to abstain from alcohol and from all other non-prescribed mood altering chemicals, but he had lacked consistent behavior to support abstinence, including failing to seek out prior substance abuse treatment despite having a significant history of having negative consequences due to his substance abuse issues.  The patient reported he was very motivated to stop his use of alcohol, but he wished to discuss his options for completing a detoxification and for entering a substance use disorder treatment program with his wife and with his sisters prior to moving forward with a specific treatment plan.  The patient had been directed to contact the patient navigator, LESLEY Kelley, at Tel #: [phone] for any follow-up regarding today's substance use disorder assessment, including if the patient decided he would be willing to enter  a substance use disorder treatment program within the next 30-days.  The patient had also been provided with the contact information for Pat Hinkle, the [facility] IOP , tel #: [phone] in case he wished to pursue entering an intensive outpatient substance use disorder treatment program versus entering the [facility] treatment program at [facility] in [place], [place].    Dimension 5 - Relapse/Continued Use/ Continued Problem Potential: 4 - Extreme Problem  Summary to support rating:  The patient had continued to abuse alcohol despite having negative consequences, including having mental health issues, medical problems, relationship problems, and employment problems which were exacerbated by his substance abuse.  The patient has been unable to maintain abstinence from alcohol while living at his current home environment.  The patient appears to lack sober coping skills and he lacks long-term sober maintenance skills.  The patient has dual issues of mental health and substance abuse.   The patient has mental health symptoms which are exacerbated by his substance abuse.  The patient has medical problems which are exacerbated by his substance abuse. The patient reported his current financial problems could be a potential trigger for him to relapse with alcohol.  The patient lacks awareness regarding the disease model of addiction.  The patient lacks a current sober peer support network.    Dimension 6 - Recovery Environment:  3 - Severe Problem  Summary to support rating:  The patient reported being  1 time and he is still  to his wife.  The patient identified as being heterosexual and he reported being  to his wife for the past 3 years.  The patient denied having any children.  The patient's current living/housing situation involves staying in own home/apartment.  The patient reported living with his wife and he reported his housing is stable.  The  patient denied having any concerns regarding his immediate living environment and/or neighborhood, but he had been unable to maintain abstinence from alcohol while living there.  The patient identified his wife, his father, his mother, and his siblings as being his primary support network at this time.  The patient identified the quality of his relationships with his support network as being stable and meaningful.  The patient would like the following people involved in treatment services if recommended: None at this time.  The patient reported engaging in the following recreational/leisure activities: playing guitar, biking, reading and going hiking.  The patient reported engaging in the following recreation/leisure activities while using alcohol or other non-prescribed mood altering chemicals: The patient's use of alcohol had been done independently of his social/recreational/leisure activities.  The patient reported the following people are supportive of his recovery: his wife, his father, his mother, and his siblings.  The patient reported he had been working full-time as a  at the [employer] since 1999.  The patient reported his income is obtained from employment, spouse, and parents.  The patient reported having financial stressors at this time, including money being somewhat tight at this time.  Cultural and socioeconomic factors do not affect the patient's access to services.  The patient denied having any substance related arrests or legal issues.  The patient denied having any history of being on court probation.    Significant Losses / Trauma / Abuse / Neglect Issues:   The patient did not serve in the .  There are indications or report of significant loss, trauma, abuse or neglect issues related to: The patient denied experiencing or witnessing any verbal, physical or sexual abuse when he was growing up in the family home.  The patient denied having any history of  being teased, picked on, or bullied in any significant manner when he was a child.  The patient denied having any history of being verbally, emotionally, physically, or sexually abused.  The patient denied having a history of being a victim of exploitation.  The patient reported having a history of trauma issues due to having additional job stress with his promotion, due to taking care of his elderly parents and due to having stress regarding all of the work that needs to be done on his newly purchased home.  Concerns for possible neglect are not present.    Safety Assessment:   The patient denies current or past homicidal ideation and behaviors.  The patient denied any current or past history of having suicidal ideation and he denied having any history of suicide attempts.  The patient denied having any history of self-injurious behavior.   The patient reported unsafe motor vehicle operation, reported having mental health problems, and reported having health problems associated with substance use.  The patient reported substance use associated with mental health symptoms.  The patient denied having any current or past personal safety concerns.   The patient denied a history of assaultive behaviors.    The patient denied having any history of sexual assault behaviors.  The patient denied having any history of being registered as a sex offender.   The patient reported there are not any firearms in the home.    Patient reports the following protective factors: forward/future oriented thinking, dedication to family/friends, safe and stable environment, adherence with prescribed medication, living with other people, daily obligations, structured day, and healthy fear of risky behaviors or pain.       Risk Plan:  See Recommendations for Safety and Risk Management Plan.    Review of Symptoms per patient report:  Substance Use:  significant withdrawal symptoms, substance use related medical issues, substance use related  mental health issues, daily use, cravings/urges to use, family relationship problems due to substance use, and substance related decrease in work performance.    Diagnostic Criteria:   1.)  Substance is often taken in larger amounts or over a longer period than was intended.  Met for:  alcohol.  2.)  There is persistent desire or unsuccessful efforts to cut down or control use of the substance.  Met for:  alcohol.  3.)  A great deal of time is spent in activities necessary to obtain the substance, use the substance, or recover from its effects.  Met for:  alcohol.  4.)  Craving, or a strong desire or urge to use the substance.  Met for:  alcohol.  5.)  Recurrent use of the substance resulting in a failure to fulfill major role obligations at work, school, or home.  Met for:  alcohol.  6.)  Continued use of the substance despite having persistent or recurrent social or interpersonal problems caused or exacerbated by the effects of its use.  Met for:  alcohol.  7.)  Important social, occupational, or recreational activities are given up or reduced because of the substance.  Met for:  alcohol.  8.)  Recurrent use of the substance in which it is physically hazardous.  Met for:  alcohol.  9.)  Use of the substance is continued despite knowledge of having a persistent or recurrent physical or psychological problem that is likely to have been cause or exacerbated by the substance.  Met for:  alcohol.  10.)  Tolerance:  either a need for markedly increased amounts of the substance to achieve the desired effect or a markedly diminished effect with continued use of the same amount of the substance.  Met for:  alcohol.  11.)  Withdrawal:  either patient endorses characteristic withdrawal syndrome for the substance or the substance (or closely related substance) is taken to relieve or avoid withdrawal symptoms.  Met for:  alcohol.    Collateral Contact Summary:   Collateral contacts contributing to this assessment:  The  patient's electronic medical records were reviewed at time of assessment.    No additional collateral data had been obtained at the time of this documentation.     If court related records were reviewed, summarize here: None    Information from collateral contacts supported/largely agreed with information from the client and associated risk ratings.    Information in this assessment was obtained from the medical record and provided by the patient who is a good historian.        The patient will have open access to his substance use disorder assessment medical record.    As evidenced by self report and criteria, the patient meets the following DSM-5 Diagnoses: (Sustained by DSM-5 Criteria Listed Above)      1.)  Alcohol Use Disorder Severe - 303.90 (F10.20)  2.)  MDD, per patient self-report  3.)  LOWELL, per patient self-report  4.)  Social anxiety disorder NOS, per patient self-report    Specify if: In early remission:  After full criteria for alcohol/drug use disorder were previously met, none of the criteria for alcohol/drug use disorder have been met for at least 3 months but for less than 12 months (with the exception that Criterion A4,  Craving or a strong desire or urge to use alcohol/drug  may be met).     In sustained remission:   After full criteria for alcohol use disorder were previously met, none of the criteria for alcohol/drug use disorder have been met at any time during a period of 12 months or longer (with the exception that Criterion A4,  Craving or strong desire or urge to use alcohol/drug  may be met).     Specify if:   This additional specifier is used if the individual is in an environment where access to alcohol is restricted.    Mild: Presence of 2-3 symptoms  Moderate: Presence of 4-5 symptoms  Severe: Presence of 6 or more symptoms    Functional Status:  The patient reported the following functional impairments:  chronic disease management and work / vocational responsibilities.        DEBI/DUAL Programmatic Care:    1. Does the patient have a history of vulnerability such as being teased, picked on, or other indications of potential safety issues with other residents?  The patient denied having any history of being teased, picked on, or bullied in any significant manner when he was a child.    2. Does this patient have a history of being the victim of abuse? The patient denied having any history of being verbally, emotionally, physically, or sexually abused.    3. Does this patient have a history of victimizing others? No     4. Does the patient have a history of boundary violations?  No.    5. Does the patient have a history of other sexual acting out behaviors (e.g grooming)?   No    6. Does the patient have a history of threats to self or others? Fire setting, running away or other self-injurious behaviors?    No    7. Does the patient s history indicate the need for special precautions or particular staffing patterns in the facility?  No    NOTE: If this screening indicates that the patient is at risk to harm self or others, notify staff at referral location.    Recommendations:     1. Plan for Safety and Risk Management:     Recommended that patient call 911 or go to the local ED should there be a change in any of these risk factors.            Report to child / adult protection services was not needed.    2. DEBI Referrals:      Recommendations:      1.)  The patient has a significant risk of developing withdrawal symptoms from alcohol.  If the patient were to start to experience significant withdrawal symptoms from alcohol, he had been encouraged to go to the [facility] Emergency Department or go to a different hospital's Emergency Department or Urgent Care to be evaluated for an Inpatient detoxification admission to complete a detoxification off of alcohol under medical supervision and/or to be evaluated for being prescribed medication to help him tolerate the alcohol  withdrawal symptoms.  2.)  Abstain from alcohol and from all other non-prescribed mood altering chemicals.   3.)  Have a mental health evaluation to address his current clinical mental health issues while on a residential or board and lodging substance use disorder treatment program unit.  4.)  Follow all of the recommendations of his medical and mental health providers.  5.)  Enter the [facility] program at [facility] in [place], [place] or enter a similar residential or board and lodging treatment program for substance use disorder treatment.  6.)  Follow all of the recommendations of his substance use disorder treatment providers including entering an extended care program as needed.        The patient reported he was very motivated to stop his use of alcohol, but he wished to discuss his options for completing a detoxification and for entering a substance use disorder treatment program with his wife and with his sisters prior to moving forward with a specific treatment plan.  The patient had been directed to contact the patient navigator, LESLEY Kelley, at Tel #: [phone] for any follow-up regarding today's substance use disorder assessment, including if the patient decided he would be willing to enter a substance use disorder treatment program within the next 30-days.  The patient had also been provided with the contact information for Pat Hinkle, the [facility] IOP , tel #: [phone] in case he wished to pursue entering an intensive outpatient substance use disorder treatment program versus entering the [facility] treatment program at [facility] in [place], [place].    The patient meets criteria for the following levels of care based on ASAM Criteria:      Withdrawal Management - The patient has a significant risk of developing withdrawal symptoms from alcohol.  If the patient were to start to experience significant withdrawal symptoms from alcohol,  he had been encouraged to go to the [facility] Emergency Department or go to a different hospital's Emergency Department or Urgent Care to be evaluated for an Inpatient detoxification admission to complete a detoxification off of alcohol under medical supervision and/or to be evaluated for being prescribed medication to help him tolerate the alcohol withdrawal symptoms.    Treatment/Recovery Services - 3.5 Clinically Managed Medium and High Intensity Residential Services.      The patient's placement does not align with the assessment and placement level of care recommendation based on current ASAM Dimension scale ratings.  Rationale for current placement:  The patient reported he was very motivated to stop his use of alcohol, but he wished to discuss his options for completing a detoxification and for entering a substance use disorder treatment program with his wife and with his sisters prior to moving forward with a specific treatment plan.  The patient had been directed to contact the patient navigator, LESLEY Kelley, at Tel #: [phone] for any follow-up regarding today's substance use disorder assessment, including if the patient decided he would be willing to enter a substance use disorder treatment program within the next 30-days.  The patient had also been provided with the contact information for Pat Hinkle, the [facility] IOP , tel #: [phone] in case he wished to pursue entering an intensive outpatient substance use disorder treatment program versus entering the [facility] treatment program at [facility] in [place], [place].    The patient would like the following family or other support people involved in their treatment:  his wife.  The patient does not have any history of opioid abuse.      3.  Mental Health Referrals:     The patient would benefit from having a mental health evaluation to address his current mental health issues  while on the residential or board and lodging treatment program unit.    4. Clinical Substantiation for the above recommendations: The patient has a significant risk of developing withdrawal symptoms from alcohol.  If the patient were to start to experience significant withdrawal symptoms from alcohol, he had been encouraged to go to the [facility] Emergency Department or go to a different hospital's Emergency Department or Urgent Care to be evaluated for an Inpatient detoxification admission to complete a detoxification off of alcohol under medical supervision and/or to be evaluated for being prescribed medication to help him tolerate the alcohol withdrawal symptoms.  The patient has been unable to maintain abstinence from alcohol while living at his current home environment, would benefit from developing long-term sober maintenance skills, would benefit from developing sober coping skills, would benefit from developing a sober peer support network, has dual issues of mental health and substance abuse, has mental health symptoms which are exacerbated by substance abuse, has medical issues which are exacerbated by substance abuse, and lacks awareness regarding the disease model of addiction.    5.  Cultural Concerns:    The patient did not identify having any cultural concerns regarding mental health, physical health, or substance use issues.     6. Recommendations for treatment focus:      Alcohol / Substance Use - See #2. DEBI Referrals above for details on recommendations.    7. Interactive Complexity: No    8. Safety Plan:  Patient denied any current/recent/lifetime history of suicidal ideation and/or behaviors.  No safety plan indicated at this time.     Provider Name/ Credentials:  LESLEY Segundo  July 29, 2025

## 2025-07-30 ENCOUNTER — HOSPITAL ENCOUNTER (INPATIENT)
Facility: CLINIC | Age: 55
End: 2025-07-30
Attending: EMERGENCY MEDICINE | Admitting: PSYCHIATRY & NEUROLOGY
Payer: COMMERCIAL

## 2025-07-30 ENCOUNTER — TELEPHONE (OUTPATIENT)
Dept: BEHAVIORAL HEALTH | Facility: CLINIC | Age: 55
End: 2025-07-30
Payer: COMMERCIAL

## 2025-07-30 DIAGNOSIS — F10.929 ALCOHOL INTOXICATION: Primary | ICD-10-CM

## 2025-07-30 DIAGNOSIS — F10.920 ALCOHOLIC INTOXICATION WITHOUT COMPLICATION: ICD-10-CM

## 2025-07-30 PROBLEM — F10.930 ALCOHOL WITHDRAWAL SYNDROME, UNCOMPLICATED (H): Status: ACTIVE | Noted: 2025-07-30

## 2025-07-30 LAB
ALBUMIN SERPL BCG-MCNC: 4.4 G/DL (ref 3.5–5.2)
ALCOHOL BREATH TEST: 0.22 (ref 0–0.01)
ALP SERPL-CCNC: 139 U/L (ref 40–150)
ALT SERPL W P-5'-P-CCNC: 63 U/L (ref 0–70)
AMPHETAMINES UR QL SCN: NORMAL
ANION GAP SERPL CALCULATED.3IONS-SCNC: 18 MMOL/L (ref 7–15)
AST SERPL W P-5'-P-CCNC: 130 U/L (ref 0–45)
BARBITURATES UR QL SCN: NORMAL
BASOPHILS # BLD AUTO: 0.1 10E3/UL (ref 0–0.2)
BASOPHILS NFR BLD AUTO: 1 %
BENZODIAZ UR QL SCN: NORMAL
BILIRUB SERPL-MCNC: 1.7 MG/DL
BUN SERPL-MCNC: 5.1 MG/DL (ref 6–20)
BZE UR QL SCN: NORMAL
CALCIUM SERPL-MCNC: 9.2 MG/DL (ref 8.8–10.4)
CANNABINOIDS UR QL SCN: NORMAL
CHLORIDE SERPL-SCNC: 97 MMOL/L (ref 98–107)
CREAT SERPL-MCNC: 0.68 MG/DL (ref 0.67–1.17)
EGFRCR SERPLBLD CKD-EPI 2021: >90 ML/MIN/1.73M2
EOSINOPHIL # BLD AUTO: 0.1 10E3/UL (ref 0–0.7)
EOSINOPHIL NFR BLD AUTO: 1 %
ERYTHROCYTE [DISTWIDTH] IN BLOOD BY AUTOMATED COUNT: ABNORMAL %
FENTANYL UR QL: NORMAL
GLUCOSE SERPL-MCNC: 92 MG/DL (ref 70–99)
HCO3 SERPL-SCNC: 23 MMOL/L (ref 22–29)
HCT VFR BLD AUTO: 34.8 % (ref 40–53)
HGB BLD-MCNC: 12.6 G/DL (ref 13.3–17.7)
IMM GRANULOCYTES # BLD: 0.1 10E3/UL
IMM GRANULOCYTES NFR BLD: 1 %
LIPASE SERPL-CCNC: 33 U/L (ref 13–60)
LYMPHOCYTES # BLD AUTO: 2.3 10E3/UL (ref 0.8–5.3)
LYMPHOCYTES NFR BLD AUTO: 22 %
MAGNESIUM SERPL-MCNC: 2 MG/DL (ref 1.7–2.3)
MCH RBC QN AUTO: 38.4 PG (ref 26.5–33)
MCHC RBC AUTO-ENTMCNC: 36.2 G/DL (ref 31.5–36.5)
MCV RBC AUTO: 106 FL (ref 78–100)
MONOCYTES # BLD AUTO: 0.7 10E3/UL (ref 0–1.3)
MONOCYTES NFR BLD AUTO: 7 %
NEUTROPHILS # BLD AUTO: 7.4 10E3/UL (ref 1.6–8.3)
NEUTROPHILS NFR BLD AUTO: 69 %
NRBC # BLD AUTO: 0 10E3/UL
NRBC BLD AUTO-RTO: 0 /100
OPIATES UR QL SCN: NORMAL
PCP QUAL URINE (ROCHE): NORMAL
PLAT MORPH BLD: NORMAL
PLATELET # BLD AUTO: 207 10E3/UL (ref 150–450)
POTASSIUM SERPL-SCNC: 3.9 MMOL/L (ref 3.4–5.3)
PROT SERPL-MCNC: 6.6 G/DL (ref 6.4–8.3)
RBC # BLD AUTO: 3.28 10E6/UL (ref 4.4–5.9)
RBC MORPH BLD: NORMAL
SODIUM SERPL-SCNC: 138 MMOL/L (ref 135–145)
WBC # BLD AUTO: 10.7 10E3/UL (ref 4–11)

## 2025-07-30 PROCEDURE — 250N000013 HC RX MED GY IP 250 OP 250 PS 637: Performed by: PSYCHIATRY & NEUROLOGY

## 2025-07-30 PROCEDURE — 250N000013 HC RX MED GY IP 250 OP 250 PS 637: Performed by: STUDENT IN AN ORGANIZED HEALTH CARE EDUCATION/TRAINING PROGRAM

## 2025-07-30 PROCEDURE — 84155 ASSAY OF PROTEIN SERUM: CPT | Performed by: STUDENT IN AN ORGANIZED HEALTH CARE EDUCATION/TRAINING PROGRAM

## 2025-07-30 PROCEDURE — 82247 BILIRUBIN TOTAL: CPT | Performed by: STUDENT IN AN ORGANIZED HEALTH CARE EDUCATION/TRAINING PROGRAM

## 2025-07-30 PROCEDURE — 36415 COLL VENOUS BLD VENIPUNCTURE: CPT | Performed by: STUDENT IN AN ORGANIZED HEALTH CARE EDUCATION/TRAINING PROGRAM

## 2025-07-30 PROCEDURE — 99285 EMERGENCY DEPT VISIT HI MDM: CPT | Mod: FS | Performed by: EMERGENCY MEDICINE

## 2025-07-30 PROCEDURE — 128N000004 HC R&B CD ADULT

## 2025-07-30 PROCEDURE — 80307 DRUG TEST PRSMV CHEM ANLYZR: CPT | Performed by: STUDENT IN AN ORGANIZED HEALTH CARE EDUCATION/TRAINING PROGRAM

## 2025-07-30 PROCEDURE — 83735 ASSAY OF MAGNESIUM: CPT | Performed by: STUDENT IN AN ORGANIZED HEALTH CARE EDUCATION/TRAINING PROGRAM

## 2025-07-30 PROCEDURE — 99207 PR NO CHARGE LOS: CPT | Performed by: CLINICAL NURSE SPECIALIST

## 2025-07-30 PROCEDURE — 82075 ASSAY OF BREATH ETHANOL: CPT | Performed by: EMERGENCY MEDICINE

## 2025-07-30 PROCEDURE — 99285 EMERGENCY DEPT VISIT HI MDM: CPT | Performed by: EMERGENCY MEDICINE

## 2025-07-30 PROCEDURE — 83690 ASSAY OF LIPASE: CPT | Performed by: PSYCHIATRY & NEUROLOGY

## 2025-07-30 PROCEDURE — 85004 AUTOMATED DIFF WBC COUNT: CPT | Performed by: STUDENT IN AN ORGANIZED HEALTH CARE EDUCATION/TRAINING PROGRAM

## 2025-07-30 PROCEDURE — HZ2ZZZZ DETOXIFICATION SERVICES FOR SUBSTANCE ABUSE TREATMENT: ICD-10-PCS | Performed by: STUDENT IN AN ORGANIZED HEALTH CARE EDUCATION/TRAINING PROGRAM

## 2025-07-30 RX ORDER — THERA TABS 400 MCG
1 TAB ORAL DAILY
Status: DISPENSED | OUTPATIENT
Start: 2025-07-30

## 2025-07-30 RX ORDER — MULTIPLE VITAMINS W/ MINERALS TAB 9MG-400MCG
1 TAB ORAL ONCE
Status: COMPLETED | OUTPATIENT
Start: 2025-07-30 | End: 2025-07-30

## 2025-07-30 RX ORDER — MAGNESIUM HYDROXIDE/ALUMINUM HYDROXICE/SIMETHICONE 120; 1200; 1200 MG/30ML; MG/30ML; MG/30ML
30 SUSPENSION ORAL EVERY 4 HOURS PRN
Status: ACTIVE | OUTPATIENT
Start: 2025-07-30

## 2025-07-30 RX ORDER — POLYETHYLENE GLYCOL 3350 17 G/17G
17 POWDER, FOR SOLUTION ORAL DAILY PRN
Status: ACTIVE | OUTPATIENT
Start: 2025-07-30

## 2025-07-30 RX ORDER — HYDROXYZINE HYDROCHLORIDE 25 MG/1
25 TABLET, FILM COATED ORAL EVERY 4 HOURS PRN
Status: ACTIVE | OUTPATIENT
Start: 2025-07-30

## 2025-07-30 RX ORDER — LOPERAMIDE HYDROCHLORIDE 2 MG/1
2 CAPSULE ORAL 4 TIMES DAILY PRN
Status: ACTIVE | OUTPATIENT
Start: 2025-07-30

## 2025-07-30 RX ORDER — FOLIC ACID 1 MG/1
1 TABLET ORAL ONCE
Status: COMPLETED | OUTPATIENT
Start: 2025-07-30 | End: 2025-07-30

## 2025-07-30 RX ORDER — IBUPROFEN 600 MG/1
600 TABLET, FILM COATED ORAL EVERY 6 HOURS PRN
Status: DISCONTINUED | OUTPATIENT
Start: 2025-07-30 | End: 2025-07-31

## 2025-07-30 RX ORDER — FOLIC ACID 1 MG/1
1 TABLET ORAL DAILY
Status: DISPENSED | OUTPATIENT
Start: 2025-07-30

## 2025-07-30 RX ORDER — HYDROXYZINE HYDROCHLORIDE 25 MG/1
25 TABLET, FILM COATED ORAL ONCE
Status: COMPLETED | OUTPATIENT
Start: 2025-07-30 | End: 2025-07-30

## 2025-07-30 RX ORDER — ONDANSETRON 4 MG/1
4 TABLET, ORALLY DISINTEGRATING ORAL EVERY 6 HOURS PRN
Status: ACTIVE | OUTPATIENT
Start: 2025-07-30

## 2025-07-30 RX ORDER — DIAZEPAM 10 MG/1
10 TABLET ORAL EVERY 30 MIN PRN
Status: DISCONTINUED | OUTPATIENT
Start: 2025-07-30 | End: 2025-07-31

## 2025-07-30 RX ADMIN — THIAMINE HCL TAB 100 MG 100 MG: 100 TAB at 16:24

## 2025-07-30 RX ADMIN — THIAMINE HCL TAB 100 MG 100 MG: 100 TAB at 19:19

## 2025-07-30 RX ADMIN — HYDROXYZINE HYDROCHLORIDE 25 MG: 25 TABLET, FILM COATED ORAL at 16:24

## 2025-07-30 RX ADMIN — FOLIC ACID 1 MG: 1 TABLET ORAL at 19:19

## 2025-07-30 RX ADMIN — FOLIC ACID 1 MG: 1 TABLET ORAL at 16:24

## 2025-07-30 RX ADMIN — THERA TABS 1 TABLET: TAB at 19:19

## 2025-07-30 RX ADMIN — Medication 1 TABLET: at 16:24

## 2025-07-30 RX ADMIN — DIAZEPAM 10 MG: 10 TABLET ORAL at 21:56

## 2025-07-30 RX ADMIN — DIAZEPAM 10 MG: 10 TABLET ORAL at 19:19

## 2025-07-30 ASSESSMENT — ACTIVITIES OF DAILY LIVING (ADL)
ADLS_ACUITY_SCORE: 41
ADLS_ACUITY_SCORE: 41
ADLS_ACUITY_SCORE: 15
ADLS_ACUITY_SCORE: 41

## 2025-07-30 ASSESSMENT — LIFESTYLE VARIABLES
HEADACHE, FULLNESS IN HEAD: NOT PRESENT
ANXIETY: 2
ORIENTATION AND CLOUDING OF SENSORIUM: ORIENTED AND CAN DO SERIAL ADDITIONS
ANXIETY: MILDLY ANXIOUS
ORIENTATION AND CLOUDING OF SENSORIUM: ORIENTED AND CAN DO SERIAL ADDITIONS
AGITATION: NORMAL ACTIVITY
NAUSEA AND VOMITING: NO NAUSEA AND NO VOMITING
PAROXYSMAL SWEATS: NO SWEAT VISIBLE
NAUSEA AND VOMITING: NO NAUSEA AND NO VOMITING
PAROXYSMAL SWEATS: BARELY PERCEPTIBLE SWEATING, PALMS MOIST
AUDITORY DISTURBANCES: NOT PRESENT
TOTAL SCORE: 8
AUDITORY DISTURBANCES: NOT PRESENT
AGITATION: 3
TOTAL SCORE: 1
VISUAL DISTURBANCES: NOT PRESENT
TREMOR: NOT VISIBLE, BUT CAN BE FELT FINGERTIP TO FINGERTIP
VISUAL DISTURBANCES: NOT PRESENT
HEADACHE, FULLNESS IN HEAD: NOT PRESENT
TACTILE DISTURBANCES: VERY MILD ITCHING, PINS AND NEEDLES, BURNING OR NUMBNESS
TREMOR: NO TREMOR

## 2025-07-30 NOTE — TELEPHONE ENCOUNTER
S: [facility] , Provider Mukund MENDOZA calling at 5:25 PM with clinical on 55 year old/male presenting for alcohol detox.     B: Pt presents for ETOH detox.   Currently reports drinking 1/3-1/2 liter of vodka daily for 10 years.    Patient reports last use was around noon.  Pt BAC: .221  Pt  denies hx of DT  Pt  denies hx of seizures. Last seizure: N/A  Pt endorsing the following symptoms of withdrawal: Shaky and Anxious  MSSA Score: CIWA score = 8    Pt denies acute mental health or medical concerns.   Pt denies other drug use: None Amount/frequency: N/A    Does Pt have a detox care plan in Epic? None  Does pt present with specific needs, assistive devices, or exclusionary criteria? None  Is the patient ambulating, eating and drinking in the ED? Yes    A: Pt meets criteria to be presented for IP detox admission. Patient is voluntary    COVID Symptoms: No  If yes, COVID test required   Utox: Negative  Magnesium: WNL  CMP: Abnormalities:  AST is 130  CBC: WNL  HCG: N/A     R: Patient cleared and ready for behavioral bed placement: Yes    Pt is meeting criteria for presentation to 3A/CD    Does Patient need a Transfer Center request created? No, Pt is located within [facility] ED, [facility] ED, or [facility]    5:31 PM Paged unit 3A On Call Provider    Provider accepted Pt via Teams Chat message to 3A/CD/Asia Prado  5:36 PM  MRN: [id number] accepted to 3A/CD for Dr. Conway    Updated worklist and added to the admit board.    5:40 PM Updated unit 3A     5:41 PM Updated [facility] ED

## 2025-07-30 NOTE — PROGRESS NOTES
Brief Psychiatry Note   Chart and labs reviewed. Patient meets criteria for 3A detox admission to detox from alcohol. Orders for admission placed.     Most Recent 2 LFT's:  Recent Labs   Lab Test 07/30/25  1600 05/22/25  1433   * 46*   ALT 63 21   ALKPHOS 139 69   BILITOTAL 1.7* 1.5*       JAIMIE Chowdhury CNP

## 2025-07-30 NOTE — ED TRIAGE NOTES
Pt seeking detox from alcohol. Has been drinking everyday for years. Drinks about half a liter a day, last drink was at noon. Denies and known hx of withdrawal seizures or DTs.

## 2025-07-30 NOTE — ED NOTES
Patient was made aware that the detox unit is a locked unit and that cell phones are not allowed on the unit, patient verbalize understanding.

## 2025-07-30 NOTE — ED TRIAGE NOTES
Triage Assessment (Adult)       Row Name 07/30/25 1539          Triage Assessment    Airway WDL WDL        Respiratory WDL    Respiratory WDL WDL        Skin Circulation/Temperature WDL    Skin Circulation/Temperature WDL WDL        Cardiac WDL    Cardiac WDL WDL        Peripheral/Neurovascular WDL    Peripheral Neurovascular WDL WDL        Cognitive/Neuro/Behavioral WDL    Cognitive/Neuro/Behavioral WDL WDL

## 2025-07-30 NOTE — ED PROVIDER NOTES
[facility] EMERGENCY DEPARTMENT ([facility])    25      ED PROVIDER NOTE   History     Chief Complaint   Patient presents with    Alcohol Problem     The history is provided by the patient and medical records. No  was used.     Anatoliy Boykin is a 55 year old male with history of alcohol dependence, alcoholic liver cirrhosis who presents to the emergency department seeking alcohol detox. He reports he has struggled with alcohol almost daily over the last 10 years, had about 1 month sobriety in April of this year. Recently, he has been drinking about half a liter of vodka daily with last drink around noon. He has never been to detox before. No history of alcohol withdrawal seizures or DTs. No suicidal or homicidal ideation, no auditory or visual hallucinations. He reports he is feeling anxious and a little agitated at this time, otherwise doing well. No fever, cough, congestion, runny nose, sore throat, chest pain, shortness of breath, abdominal pain, nausea, vomiting, diarrhea, urinary symptoms, or any other concerns at this time.     Past Medical History  Past Medical History:   Diagnosis Date    Alcoholic cirrhosis of liver without ascites (H)     Elevated liver enzymes     LOWELL (generalized anxiety disorder)     MDD (major depressive disorder)     social anxiety disorder      No past surgical history on file.  No current outpatient medications on file.    Allergies   Allergen Reactions    Pcn [Penicillins]      As child, pt does not know reaction     Family History  Family History   Problem Relation Age of Onset    Hypertension Mother     EYE* Mother         Cataracts     Hypertension Father     EYE* Father         Cataracts     Arrhythmia Father     Cancer Maternal Grandmother     Colon Cancer Maternal Grandmother          at 66    Substance Abuse Maternal Grandfather     Myocardial Infarction Maternal Grandfather          at 83    Arrhythmia Maternal Grandfather  "    Hypertension Maternal Grandfather     Myocardial Infarction Paternal Grandmother          72    Substance Abuse Paternal Grandfather     Kidney Disease Paternal Grandfather     Hypertension Maternal Uncle     Diabetes Maternal Uncle     Cancer Paternal Aunt     Cancer Paternal Uncle      Social History   Social History     Tobacco Use    Smoking status: Never    Smokeless tobacco: Never   Vaping Use    Vaping status: Never Used   Substance Use Topics    Alcohol use: Yes     Comment: \"drinking alot, 6-10 shots of Vodka a day\"    Drug use: No      A medically appropriate review of systems was performed with pertinent positives and negatives noted in the HPI, and all other systems negative.    Physical Exam   BP: 116/79  Pulse: 97  Temp: 97.6  F (36.4  C)  Resp: 16  Height: 182.9 cm (6')  Weight: 92.1 kg (203 lb)  SpO2: 97 %  Physical Exam  Vitals and nursing note reviewed.   Constitutional:       General: He is not in acute distress.     Appearance: Normal appearance. He is not ill-appearing, toxic-appearing or diaphoretic.      Comments: Awake, sitting on edge of bed, pleasant, answering questions appropriately   HENT:      Head: Atraumatic.      Mouth/Throat:      Mouth: Mucous membranes are moist.      Pharynx: Oropharynx is clear.   Eyes:      General: Scleral icterus (mild) present.      Conjunctiva/sclera: Conjunctivae normal.   Cardiovascular:      Rate and Rhythm: Normal rate and regular rhythm.      Heart sounds: Normal heart sounds.   Pulmonary:      Effort: Pulmonary effort is normal. No respiratory distress.      Breath sounds: Normal breath sounds. No stridor. No wheezing, rhonchi or rales.   Abdominal:      General: Abdomen is flat.      Palpations: Abdomen is soft.      Tenderness: There is no abdominal tenderness. There is no guarding or rebound.   Musculoskeletal:         General: No deformity.      Cervical back: Neck supple.   Skin:     General: Skin is warm.   Neurological:      General: No " focal deficit present.      Mental Status: He is alert and oriented to person, place, and time.      Comments: No tremors or tongue fasciculations   Psychiatric:         Mood and Affect: Mood normal.         Behavior: Behavior normal.         Thought Content: Thought content normal.         Judgment: Judgment normal.       ED Course, Procedures, & Data     ED Course as of 07/30/25 2305 Wed Jul 30, 2025   1532 Detox bed reserved   1727 Report given to detox intake     Procedures          Results for orders placed or performed during the hospital encounter of 07/30/25   Comprehensive Metabolic Panel (Limited Occurrences)   Result Value Ref Range    Sodium 138 135 - 145 mmol/L    Potassium 3.9 3.4 - 5.3 mmol/L    Carbon Dioxide (CO2) 23 22 - 29 mmol/L    Anion Gap 18 (H) 7 - 15 mmol/L    Urea Nitrogen 5.1 (L) 6.0 - 20.0 mg/dL    Creatinine 0.68 0.67 - 1.17 mg/dL    GFR Estimate >90 >60 mL/min/1.73m2    Calcium 9.2 8.8 - 10.4 mg/dL    Chloride 97 (L) 98 - 107 mmol/L    Glucose 92 70 - 99 mg/dL    Alkaline Phosphatase 139 40 - 150 U/L     (H) 0 - 45 U/L    ALT 63 0 - 70 U/L    Protein Total 6.6 6.4 - 8.3 g/dL    Albumin 4.4 3.5 - 5.2 g/dL    Bilirubin Total 1.7 (H) <=1.2 mg/dL   Magnesium (Limited Occurrences)   Result Value Ref Range    Magnesium 2.0 1.7 - 2.3 mg/dL   CBC with platelets and differential   Result Value Ref Range    WBC Count 10.7 4.0 - 11.0 10e3/uL    RBC Count 3.28 (L) 4.40 - 5.90 10e6/uL    Hemoglobin 12.6 (L) 13.3 - 17.7 g/dL    Hematocrit 34.8 (L) 40.0 - 53.0 %     (H) 78 - 100 fL    MCH 38.4 (H) 26.5 - 33.0 pg    MCHC 36.2 31.5 - 36.5 g/dL    RDW      Platelet Count 207 150 - 450 10e3/uL    % Neutrophils 69 %    % Lymphocytes 22 %    % Monocytes 7 %    % Eosinophils 1 %    % Basophils 1 %    % Immature Granulocytes 1 %    NRBCs per 100 WBC 0 <1 /100    Absolute Neutrophils 7.4 1.6 - 8.3 10e3/uL    Absolute Lymphocytes 2.3 0.8 - 5.3 10e3/uL    Absolute Monocytes 0.7 0.0 - 1.3 10e3/uL     Absolute Eosinophils 0.1 0.0 - 0.7 10e3/uL    Absolute Basophils 0.1 0.0 - 0.2 10e3/uL    Absolute Immature Granulocytes 0.1 <=0.4 10e3/uL    Absolute NRBCs 0.0 10e3/uL   RBC and Platelet Morphology   Result Value Ref Range    RBC Morphology Confirmed RBC Indices     Platelet Assessment  Automated Count Confirmed. Platelet morphology is normal.     Automated Count Confirmed. Platelet morphology is normal.   Urine Drug Screen Panel   Result Value Ref Range    Amphetamines Urine Screen Negative Screen Negative    Barbituates Urine Screen Negative Screen Negative    Benzodiazepine Urine Screen Negative Screen Negative    Cannabinoids Urine Screen Negative Screen Negative    Cocaine Urine Screen Negative Screen Negative    Fentanyl Qual Urine Screen Negative Screen Negative    Opiates Urine Screen Negative Screen Negative    PCP Urine Screen Negative Screen Negative   Result Value Ref Range    Lipase 33 13 - 60 U/L   Alcohol breath test POCT   Result Value Ref Range    Alcohol Breath Test 0.221 (A) 0.00 - 0.01     Medications   diazepam (VALIUM) tablet 10 mg (10 mg Oral $Given 7/30/25 2156)   alum & mag hydroxide-simethicone (MAALOX) suspension 30 mL (has no administration in time range)   polyethylene glycol (MIRALAX) Packet 17 g (has no administration in time range)   melatonin tablet 3 mg (has no administration in time range)   hydrOXYzine HCl (ATARAX) tablet 25 mg (has no administration in time range)   ibuprofen (ADVIL/MOTRIN) tablet 600 mg (has no administration in time range)   ondansetron (ZOFRAN ODT) ODT tab 4 mg (has no administration in time range)   loperamide (IMODIUM) capsule 2 mg (has no administration in time range)   thiamine (B-1) tablet 100 mg (100 mg Oral $Given 7/30/25 1919)   folic acid (FOLVITE) tablet 1 mg (1 mg Oral $Given 7/30/25 1919)   multivitamin, therapeutic (THERA-VIT) tablet 1 tablet (1 tablet Oral $Given 7/30/25 1919)   thiamine (B-1) tablet 100 mg (100 mg Oral $Given 7/30/25 1624)    folic acid (FOLVITE) tablet 1 mg (1 mg Oral $Given 7/30/25 1624)   multivitamin w/minerals (THERA-VIT-M) tablet 1 tablet (1 tablet Oral $Given 7/30/25 1624)   hydrOXYzine HCl (ATARAX) tablet 25 mg (25 mg Oral $Given 7/30/25 1624)          Critical care was not performed.     Medical Decision Making  The patient's presentation was of high complexity (an acute health issue posing potential threat to life or bodily function).    The patient's evaluation involved:  review of external note(s) from 3+ sources (clinical notes)  review of 3+ test result(s) ordered prior to this encounter (prior labs)  ordering and/or review of 3+ test(s) in this encounter (see separate area of note for details)  discussion of management or test interpretation with another health professional (detox intake)    The patient's management necessitated high risk (a decision regarding hospitalization).    Assessment & Plan    Anatoliy Boykin is a 55 year old male with history of alcohol dependence, alcoholic liver cirrhosis who presents to the emergency department seeking alcohol detox. He reports drinking vodka daily for the last 10 years or so with brief episodes of sobriety, currently reports he drinks about half a liter of vodka per day, last drink around noon. Denies other substance use. Has never been to detox, denies history of withdrawal seizures or DTs. No SI/HI/AH/VH. On evaluation, vitals are in acceptable ranges, normotensive, HR 97, afebrile, normoxic. Patient is awake, alert, answering questions appropriately, anxious. Breathing comfortably on room air, no respiratory distress, no adventitious lung sounds, normal heart rate, regular rhythm. Abdomen is soft, nontender, no rebound or guarding. Exam is otherwise benign. Breathalyzed at 0.221. CBC without leukocytosis, hemoglobin 12.6, otherwise unremarkable. CMP with AST elevated to 130, without clinically significant electrolyte derangement. Magnesium 2.0. UDS negative. Initial  CIWA score of 8, CIWA protocol initiated. Discussed patient with detox intake who accepted for admission. Plan discussed with patient and wife who are in understanding and agreement, had no additional questions. Patient was admitted to detox in stable and ambulatory condition for further care.    I have reviewed the nursing notes. I have reviewed the findings, diagnosis, plan and need for follow up with the patient.    Current Discharge Medication List          Final diagnoses:   Alcohol intoxication     Aby Duval PA-C  [facility] EMERGENCY DEPARTMENT  7/30/2025           Aby Duval PA-C  07/30/25 2305  ---------------------------------------------------------------------------------------------------------------------  --    ED Attending Physician Attestation    I Patricia Rios MD, cared for this patient with the Advanced Practice Provider (OLENA). I personally provided a substantive portion of the care for this patient, including approving the care plan for the number and complexity of problems addressed and taking responsibility related to the risk of complications and/or morbidity or mortality of patient management. Please see the OLENA's documentation for full details.    Summary of HPI, PE, ED Course   Patient is a 55 year old male evaluated in the emergency department for alcohol detox.  Breathalyzer was 0.221.  Laboratory evaluation showed mild elevation of AST at 130, likely the effect of alcohol.  Patient was initiated on CIWA protocol and will be voluntarily admitted to detox at this time. After the completion of care in the emergency department, the patient was admitted to inpatient.      Patricia Rios MD  Emergency Medicine        Patricia Rios MD  07/31/25 0037

## 2025-07-31 VITALS
OXYGEN SATURATION: 98 % | HEART RATE: 94 BPM | SYSTOLIC BLOOD PRESSURE: 129 MMHG | DIASTOLIC BLOOD PRESSURE: 84 MMHG | TEMPERATURE: 98.5 F | WEIGHT: 203 LBS | HEIGHT: 72 IN | BODY MASS INDEX: 27.5 KG/M2 | RESPIRATION RATE: 16 BRPM

## 2025-07-31 PROBLEM — F10.239 ALCOHOL DEPENDENCE WITH WITHDRAWAL WITH COMPLICATION (H): Status: ACTIVE | Noted: 2025-07-30

## 2025-07-31 LAB
ALBUMIN SERPL BCG-MCNC: 3.5 G/DL (ref 3.5–5.2)
ALP SERPL-CCNC: 110 U/L (ref 40–150)
ALT SERPL W P-5'-P-CCNC: 53 U/L (ref 0–70)
ANION GAP SERPL CALCULATED.3IONS-SCNC: 12 MMOL/L (ref 7–15)
AST SERPL W P-5'-P-CCNC: 97 U/L (ref 0–45)
BILIRUB SERPL-MCNC: 1.8 MG/DL
BUN SERPL-MCNC: 10.9 MG/DL (ref 6–20)
CALCIUM SERPL-MCNC: 8.5 MG/DL (ref 8.8–10.4)
CHLORIDE SERPL-SCNC: 96 MMOL/L (ref 98–107)
CHOLEST SERPL-MCNC: 140 MG/DL
CREAT SERPL-MCNC: 0.74 MG/DL (ref 0.67–1.17)
EGFRCR SERPLBLD CKD-EPI 2021: >90 ML/MIN/1.73M2
EST. AVERAGE GLUCOSE BLD GHB EST-MCNC: 77 MG/DL
GGT SERPL-CCNC: 327 U/L (ref 8–61)
GLUCOSE SERPL-MCNC: 100 MG/DL (ref 70–99)
HBA1C MFR BLD: 4.3 %
HCO3 SERPL-SCNC: 26 MMOL/L (ref 22–29)
HDLC SERPL-MCNC: 51 MG/DL
HOLD SPECIMEN: NORMAL
INR PPP: 1.14 (ref 0.85–1.15)
LDLC SERPL CALC-MCNC: 73 MG/DL
MAGNESIUM SERPL-MCNC: 1.7 MG/DL (ref 1.7–2.3)
NONHDLC SERPL-MCNC: 89 MG/DL
PHOSPHATE SERPL-MCNC: 2.6 MG/DL (ref 2.5–4.5)
POTASSIUM SERPL-SCNC: 4.1 MMOL/L (ref 3.4–5.3)
PROT SERPL-MCNC: 5.2 G/DL (ref 6.4–8.3)
PROTHROMBIN TIME: 15.2 SECONDS (ref 11.8–14.8)
SODIUM SERPL-SCNC: 134 MMOL/L (ref 135–145)
TRIGL SERPL-MCNC: 79 MG/DL
TSH SERPL DL<=0.005 MIU/L-ACNC: 3.79 UIU/ML (ref 0.3–4.2)

## 2025-07-31 PROCEDURE — 36415 COLL VENOUS BLD VENIPUNCTURE: CPT | Performed by: PSYCHIATRY & NEUROLOGY

## 2025-07-31 PROCEDURE — 99253 IP/OBS CNSLTJ NEW/EST LOW 45: CPT

## 2025-07-31 PROCEDURE — 82977 ASSAY OF GGT: CPT | Performed by: PSYCHIATRY & NEUROLOGY

## 2025-07-31 PROCEDURE — 80053 COMPREHEN METABOLIC PANEL: CPT

## 2025-07-31 PROCEDURE — 83735 ASSAY OF MAGNESIUM: CPT

## 2025-07-31 PROCEDURE — 128N000004 HC R&B CD ADULT

## 2025-07-31 PROCEDURE — 99223 1ST HOSP IP/OBS HIGH 75: CPT | Performed by: PSYCHIATRY & NEUROLOGY

## 2025-07-31 PROCEDURE — 85610 PROTHROMBIN TIME: CPT

## 2025-07-31 PROCEDURE — 250N000013 HC RX MED GY IP 250 OP 250 PS 637: Performed by: PSYCHIATRY & NEUROLOGY

## 2025-07-31 PROCEDURE — 84100 ASSAY OF PHOSPHORUS: CPT

## 2025-07-31 PROCEDURE — 80061 LIPID PANEL: CPT | Performed by: PSYCHIATRY & NEUROLOGY

## 2025-07-31 PROCEDURE — 84443 ASSAY THYROID STIM HORMONE: CPT | Performed by: PSYCHIATRY & NEUROLOGY

## 2025-07-31 PROCEDURE — 36415 COLL VENOUS BLD VENIPUNCTURE: CPT

## 2025-07-31 PROCEDURE — 83036 HEMOGLOBIN GLYCOSYLATED A1C: CPT | Performed by: PSYCHIATRY & NEUROLOGY

## 2025-07-31 RX ORDER — LORAZEPAM 1 MG/1
1-2 TABLET ORAL EVERY 30 MIN PRN
Status: DISPENSED | OUTPATIENT
Start: 2025-07-31

## 2025-07-31 RX ORDER — GABAPENTIN 300 MG/1
300 CAPSULE ORAL 3 TIMES DAILY
Status: DISPENSED | OUTPATIENT
Start: 2025-07-31

## 2025-07-31 RX ORDER — CETIRIZINE HYDROCHLORIDE 10 MG/1
10 TABLET ORAL DAILY PRN
Status: ON HOLD | COMMUNITY

## 2025-07-31 RX ORDER — ESCITALOPRAM OXALATE 20 MG/1
20 TABLET ORAL DAILY
Status: DISPENSED | OUTPATIENT
Start: 2025-07-31

## 2025-07-31 RX ORDER — ACETAMINOPHEN 325 MG/1
650 TABLET ORAL EVERY 6 HOURS PRN
Status: DISPENSED | OUTPATIENT
Start: 2025-07-31

## 2025-07-31 RX ORDER — CETIRIZINE HYDROCHLORIDE 10 MG/1
10 TABLET ORAL DAILY PRN
Status: ACTIVE | OUTPATIENT
Start: 2025-07-31

## 2025-07-31 RX ADMIN — GABAPENTIN 300 MG: 300 CAPSULE ORAL at 08:42

## 2025-07-31 RX ADMIN — LORAZEPAM 1 MG: 1 TABLET ORAL at 08:43

## 2025-07-31 RX ADMIN — GABAPENTIN 300 MG: 300 CAPSULE ORAL at 14:15

## 2025-07-31 RX ADMIN — ACETAMINOPHEN 650 MG: 325 TABLET ORAL at 08:44

## 2025-07-31 RX ADMIN — ESCITALOPRAM OXALATE 20 MG: 20 TABLET ORAL at 08:42

## 2025-07-31 RX ADMIN — FOLIC ACID 1 MG: 1 TABLET ORAL at 08:12

## 2025-07-31 RX ADMIN — THIAMINE HCL TAB 100 MG 100 MG: 100 TAB at 08:12

## 2025-07-31 RX ADMIN — THERA TABS 1 TABLET: TAB at 08:12

## 2025-07-31 RX ADMIN — GABAPENTIN 300 MG: 300 CAPSULE ORAL at 20:27

## 2025-07-31 ASSESSMENT — ACTIVITIES OF DAILY LIVING (ADL)
ORAL_HYGIENE: INDEPENDENT
ADLS_ACUITY_SCORE: 15
HYGIENE/GROOMING: INDEPENDENT
ADLS_ACUITY_SCORE: 15
LAUNDRY: WITH SUPERVISION
DRESS: SCRUBS (BEHAVIORAL HEALTH)
ADLS_ACUITY_SCORE: 15

## 2025-07-31 NOTE — PLAN OF CARE
Behavioral Team Discussion: (7/31/2025)    Continued Stay Criteria/Rationale: Patient admitted for Chemical Use Issues.  Plan: The following services will be provided to the patient; psychiatric assessment, medication management, therapeutic milieu, individual and group support, and skills groups.   Participants: 3A Provider: Ele Conway DO; 3A RN: Nathalie Cm RN; 3A LADC: LESLEY Moran.   Summary/Recommendation: Providers will assess today for treatment recommendations, discharge planning, and aftercare plans. LADC will meet with pt for discharge planning.   Medical/Physical: Patient has history of alcoholic liver cirrhosis.  Precautions:   Behavioral Orders   Procedures    Code 1 - Restrict to Unit    Routine Programming     As clinically indicated    Standard Monitoring: 15-Minute Checks     Every 15 minutes.    Withdrawal precautions     Rationale for change in precautions or plan: N/A  Progress: Initial.    ASAM Dimension Scale Ratings:  Dimension 1: 3 Client tolerates and delroy with withdrawal discomfort poorly. Client has severe intoxication, such that the client endangers self or others, or intoxication has not abated with less intensive levels of services. Client displays severe signs and symptoms; or risk of severe, but manageable withdrawal; or withdrawal worsening despite detox at less intensive level.  Dimension 2: 1 Client tolerates and delroy with physical discomfort and is able to get the services that the client needs.  Dimension 3: 2 Client has difficulty with impulse control and lacks coping skills. Client has thoughts of suicide or harm to others without means; however, the thoughts may interfere with participation in some treatment activities. Client has difficulty functioning in significant life areas. Client has moderate symptoms of emotional, behavioral, or cognitive problems. Client is able to participate in most treatment activities.  Dimension 4: 2 Client displays verbal  compliance, but lacks consistent behaviors; has low motivation for change; and is passively involved in treatment.  Dimension 5: 4 No awareness of the negative impact of mental health problems or substance abuse. No coping skills to arrest mental health or addiction illnesses, or prevent relapse.  Dimension 6: 3 Client is not engaged in structured, meaningful activity and the client's peers, family, significant other, and living environment are unsupportive, or there is significant criminal justice system involvement.

## 2025-07-31 NOTE — PLAN OF CARE
Problem: Alcohol Withdrawal  Goal: Alcohol Withdrawal Symptom Control  Outcome: Progressing     Problem: Sleep Disturbance  Goal: Adequate Sleep/Rest  Outcome: Progressing   Goal Outcome Evaluation:Ongoing       The patient was monitored overnight for acute alcohol withdrawal, scoring 0 and 0 on the CIWA. The registered nurse did not administer any PRN withdrawal medication, and the patient appeared comfortable while sleeping, slept for 6.75 hrs  demonstrating quiet and spontaneous breathing. There were no reported safety concerns or behavioral issues. The Team will continue to monitor the patient.         Blood pressure 115/77, pulse 103, temperature 98.6  F (37  C), temperature source Oral, resp. rate 16, height 1.829 m (6'), weight 92.1 kg (203 lb), SpO2 96%.    Blood pressure 117/77, pulse 90, temperature 99  F (37.2  C), temperature source Oral, resp. rate 16, height 1.829 m (6'), weight 92.1 kg (203 lb), SpO2 96%.

## 2025-07-31 NOTE — PLAN OF CARE
Pt scored 8 and 9 on CIWA received valium 10mgx2  for mostly anxiety, mild sweats and tremors, and head fullness.   B/P: 108/89, T: 98.7, P: 110, R: 16     Problem: Alcohol Withdrawal  Goal: Alcohol Withdrawal Symptom Control  Outcome: Progressing   Goal Outcome Evaluation:

## 2025-07-31 NOTE — PROGRESS NOTES
[facility] Admission Note    S = Situation:   Anatoliy Boykin is a 55 year old year old male BIB his wife seeking detox from alcohol for the first time.   Voluntary admission to [facility] for alcohol withdrawal and detox.    B  = Background:   Substance use history: drinking vodka for the past 10-12 years. Never been to detox or treatment. He did not need to until recently when he felt like it has started to affect his work and relationship, he's not sleeping or eating. Came here to the hospital yesterday and met with someone name Ed who recommended detox first before anything else.  Pt states he does not drink for pleasure, he drinks vodka to treat his anxiety and depression. He is prescribed gabapentin and lexapro which he reports taking as ordered.   He does not smoke or use street drugs. Last drank this morning around 11 or 12 .    Blood alcohol level:0.221, no hx of DTs or seizures.   Urine drug scree, negative.   AST :130  Per ED nurse, he scored CIWA:8, 1,  mostly due to anxiety.   Mental health history: anxiety and depression    Medical history: denied any acute concerns  Legal history: voluntary admission. No DUIs.   Treatment history: None  Living situation: own home with wife  Recent life stressors: nothing identified     A  =  Assessment:   Pt arrived on the unit from the [facility] ED at 1800. Affect flat, visibly anxious, cooperative. Denied SI or SA. No HI toward anyone. Just wants to get better. Not interested in a 30 day treatment program at this time.     R =   Request or Recommendation:   Alcohol withdrawal will be monitored and treated using CIWA with valium prn.  Pt will meet with psychiatry, internal medicine, and case management tomorrow.  At the time of admission, pt reports discharge plan is detox only.

## 2025-07-31 NOTE — PROGRESS NOTES
[address]     Daily Encounter: Met with team, discussed patient progress, discharge plan and any impediments to discharge.    Writer met with the patient to discuss discharge and aftercare planning. The patient reported drinking about half a liter of vodka nearly daily for the past decade, with increased consumption due to stressors, including work meetings and large public clients. The patient stated he plans to discharge home once he is out of detox and will discuss treatment options with his wife and family. Writer offered to send his assessment to treatment centers so he can follow up when he's ready to attend, but the patient indicated he would first discuss with his wife before proceeding with any referrals.    Insurance:  [facility]     Legal Status:  Voluntary    SUDs Assessment Status:  Assessment completed on 7/29/2025 by LESLEY Zambrano.    ROIs on file:   None.     Living Situation:   Lives in [place], [place] with his family     Current Providers, Supports & Collateral:   PCP: Efrain Posey PA-C at [facility]    Current Plan/Referral Status:   Detox only with plan to look into treatment options with his wife and sisters.    Safety Plan Status:   Completed with patient.    IP referral tracker complete:  Yes.    LESLEY Bond  [address] - Adult Inpatient Addiction Psychiatry Unit

## 2025-07-31 NOTE — PLAN OF CARE
Goal Outcome Evaluation:    Plan of Care Reviewed With: patient      Patient pleasant and cooperative, visible in milieu.  Flat affect, denies SI/SIB, endorsed depression/anxiety.  CIWA 10, 3, 2, received 1 mg ativan x1 per protocol.  Received prn tylenol x1 for headaches.  Good appetite, medication compliant.  Patient received information on naltrexone, Campra and antabuse.  Patient was seen by both internal medicine and psychiatrist; new orders.  Patient currently resting comfortably in room.  Will continue to monitor closely.

## 2025-07-31 NOTE — PHARMACY-ADMISSION MEDICATION HISTORY
Admission Medication History Completed by Pharmacy    See [facility] Admission Navigator for allergy information, preferred outpatient pharmacy, prior to admission medications and immunization status.     Medication history sources:  Patient; Surescripts     Pertinent changes made to PTA medication list:  Added:   - Cetirizine 10 mg tablets   Deleted:   - Escitalopram 10 mg tablets   - Gabapentin 100 mg capsules   - Rosuvastatin 5 mg tablets   - Omeprazole 20 mg capsules   Changed: N/A    Additional medication history information:   - Patient denies taking any additional Rx/OTC medications other than the ones listed below.    Prior to Admission medications   Medication Sig Last Dose Taking? Auth Provider Long Term End Date   cetirizine (ZYRTEC) 10 MG tablet Take 10 mg by mouth daily as needed for allergies. Past Month Yes Unknown, Entered By History     escitalopram (LEXAPRO) 20 MG tablet Take 1 tablet (20 mg) by mouth daily. 7/30/2025 Yes Efrain Posey PA-C Yes    gabapentin (NEURONTIN) 300 MG capsule Take 1 capsule (300 mg) by mouth 3 times daily. 7/30/2025 Yes Efrain Posey PA-C Yes 10/12/25          Date completed: 07/31/25    Medication history completed by:   Yanira Wise, PharmD  *[id number]

## 2025-07-31 NOTE — DISCHARGE INSTRUCTIONS
Behavioral Discharge Planning and Instructions  THANK YOU FOR CHOOSING [address]  [phone]    Summary: You were admitted to [facility] on 7/30/2025 for detoxification from Alcohol.  A medical exam was performed that included lab work. You have met with a [facility] Counselor and opted to ***.  Please take care and make your recovery a daily priority, Anatoliy!  It was a pleasure working with you and the entire treatment team here wishes you the very best in your recovery!     Recommendation:  ***    Main Diagnoses:    {Substance Related Use Disorders:171298}    Major Treatments, Procedures and Findings:  You were treated for *** detoxification using ***. As an outpatient you will be prescribed ***, please take this medication as prescribed until it is gone. You have met with a [facility] counselor to develop a treatment plan for discharge. You had labs drawn and those results were reviewed with you. Please take a copy of your lab work with you to your next primary care provider appointment.    Symptoms to Report:  If you experience more anxiety, confusion, sleeplessness, deep sadness or thoughts of suicide, notify your treatment team or notify your primary care provider. IF ANY OF THE SYMPTOMS YOU ARE EXPERIENCING ARE A MEDICAL EMERGENCY CALL 911 IMMEDIATELY.     Lifestyle Adjustment: Adjust your lifestyle to get enough sleep, relaxation, exercise and good nutrition. Continue to develop healthy coping skills to decrease stress and promote a sober living environment. Do not use mood altering substances including alcohol, illegal drugs or addictive medications other than what is currently prescribed.     Disposition: ***    Facts about COVID19 at www.cdc.gov/COVID19 and www.[place].gov/covid19    Keeping hands clean is one of the most important steps we can take to avoid getting sick and spreading germs to others.  Please wash your hands frequently and lather with soap for at least 20 seconds!    Follow-up Appointment:    Appointment Date/Time: August 4th, 2025 (video visit)   at 4:35 pm  Primary Care Giver: Dr. Posey    Address: [address], [address]    Phone Number: [phone]      Appointment Date/Time: August 14th, 2025 labs then appointment to follow  at 3:45 pm  Primary Care Giver: Dr. Posey    Address: [address], [place]  [place]    Phone Number: [phone]      Recovery apps for your phone for educational purposes and to locate in person and zoom recovery meetings  Everything AA -  nathan is a great resource  12 Step Toolkit - educational purpose learning about the 12 steps to recovery  [place] [facility] - meeting nathan  AA  - meeting nathan  Meeting guide - meeting nathan  Quick NA meeting - meeting nathan  [place]- has various apps    Patient Navigation Hub  [facility] Navigators work to be your point-of-contact for trustworthy and compassionate care from Inpatient services to [facility] Programmatic Care. We will provide resources and communication to help guide you into programmatic care. Ultimately, our goal is to be the one-stop-shop of communication, coordination, and support for your journey to programmatic care.  Phone: [phone]    Resources:  AA/NA meetings have returned to in-person or a hybrid combination of zoom/in-person therefore please check online to verify*  Need Support Now? If you or someone you know is struggling or in crisis, help is available. Call or text 988 or chat 98FABPulousline.org  AA meetings search for them at: https://aa-intergroup.org (worldwide meeting listings)  AA meetings for [place] area can be found online at: https://a[place].org (click local online meetings listings)  NA meetings for [place] area can be found online at: https://www.nam[place].org  (click find a meeting)  AA and NA Sponsors are excellent resources for support and you can find one at any support group meeting.   Alcoholics Anonymous (https://aa.org/): for information 24  "hours/day  AA Intergroup service office in [place] (http://www.aastpaul.org/) 818.885.8573  AA Intergroup service office in [facility]: [phone]. (http://www.a[place].org/)  Narcotics Anonymous (www.nam[place].org) (656) 774-1297  https://aafairviewriverside.org/meetings  SMART Recovery - self management for addiction recovery:  www.smartrecovery.org  Pathways ~ A Health Crisis Resource & Support Center:  158.245.8807.  https://prescribetoprevent.org/patient-education/videos/  http://www.harmreduction.org  Crisis Text Line  Text 762744  You will be connected with a trained live crisis counselor to provide support. Por espanol, texto  DAKOTAH a 277234 o texto a 442-AYUDAME en WhatsApp  [place] [facility] Line  1.768.SUICIDE [4794024]  [facility] [phone]  Support Group:  AA/NA and Sponsor/support.  Fast Tracker  Linking people to mental health and substance use disorder resources  [employer]Board[employer]sn.org   [place] Mental Health Warm Line  Peer to peer support  Monday thru Saturday, 12 pm to 10 pm  243.028.8388 or 3.930.049.6861  Text \"Support\" to 83326  National Shelb[employer] on Mental Illness (MODESTO)  338.431.5179 or 1.888.MODESTO.HELPS  Alcoholics Anonymous (www.alcoholics-anonymous.org): Check your phone book for your local chapter.  Suicide Awareness Voices of Education (SAVE) (www.save.org): 608-624-TIOT (7283)  National Suicide Prevention Line (www.mentalhealthmn.org): 422-499-HJUF (8198)  Mental Health Consumer/Survivor Network of [place] (www.mhcsn.net): 825.336.3103 or 197-787-6263  Mental Health Association of [place] (www.mentalhealth.org): 588.650.6156 or 253-116-3740   Substance Abuse and Mental Health Services (www.samhsa.gov)  [place] Opioid Prevention Coalition: www.opioidcoalition.org    [place] Recovery Connection (MRC)  [facility] connects people seeking recovery to resources that help foster and sustain long-term recovery.  Whether you are seeking resources for treatment, " transportation, housing, job training, education, health care or other pathways to recovery, [facility] is a great place to start.  295.558.2619.  www.[facility].org    Great Pod casts for nutrition and wellness  Listen on Apple Podcasts  Dishing Up Nutrition   [employer]   Nutrition       Understand the connection between what you eat and how you feel. Hosted by licensed nutritionists and dietitians from [employer] we share practical, real-life solutions for healthier living through nutrition.     General Medication Instructions:   See your medication sheet(s) for instructions.   Take all medications as prescribed.  Make no changes unless your primary care provider suggests them.   Go to all your primary care provider visits.  Be sure to have all your required lab tests. This way, your medicines can be refilled on time.  Do not use any forms of alcohol.    Please Note: If you have any questions at anytime after you discharged please call [facility] detox unit [facility] at [phone].    Here are a list of additional numbers you can call if you are wanting to resume services through [facility]:  [facility] Assessment Intake: [phone]  [facility] Adult DEBI Intensive Outpatient  call: [phone]  [facility] Admissions [phone]    Recovery Clinic call: [phone]  [facility]: [phone]  Medical Records call: [phone]  Billing Department call: [phone]    Please remember to take all of your behavioral discharge planning and lab paperwork to any follow up appointments, it contains your lab results, diagnosis, medication list and discharge recommendations.      THANK YOU FOR CHOOSING [facility]

## 2025-07-31 NOTE — CONSULTS
[facility]  Consult Note - Hospitalist Service  Date of Admission:  7/30/2025  Consult Requested by: Asia Royal APRN CNP   Reason for Consult: Medical comanagement    Assessment & Plan   Anatoliy Boykin is a 55 year old male admitted on 7/30/2025.  Medical history notable for alcohol use disorder, anxiety, depression.  Currently admitted for acute alcohol withdrawal, medicine consulted for medical comanagement.     #Acute alcohol withdrawal  #Alcohol use disorder  Reports problematic drinking for approximately 10 years.  No previous detox.  Did note to have brief period of 3 weeks of sobriety in April after revealing to family that he has had ongoing drinking problem.  Drinks approximately 500 mL of vodka throughout the day.  Denies any previous history of withdrawal seizures, hallucinations.  Last drink 7/30 at approximately 12 PM.  Reporting relatively minimal withdrawal symptoms at this time  -Withdrawal management as per psychiatry    #Liver cirrhosis  #Suspected alcoholic hepatitis  #Elevated AST  #Hyperbilirubinemia  Follows with [facility] hepatology. Labs upon admission noted to have elevated AST at 130.  ALT 63.  Alk phos within normal limits at 139.  Bilirubin elevated at 1.7.  GGT elevated at 327.  Suspect current elevation secondary to current alcohol consumption and chronic cirrhosis.   -Will obtain INR to calculate current meld   - Recheck CMP on 8/2, and can sign off if stable  - Follow-up with PCP/hepatology for further management of hepatitis/cirrhosis    # Moderate recurrent depression  #Social anxiety  - PTA Lexapro  - Management as per psychiatry    #Decreased oral intake  #At risk for refeeding syndrome  Patient reports overall limited oral intake with current drinking.   - Will check potassium, magnesium, phosphorus     The patient's care was discussed with the patient     Clinically Significant Risk Factors Present on Admission          #  Hypochloremia: Lowest Cl = 97 mmol/L in last 2 days, will monitor as appropriate                    # Overweight: Estimated body mass index is 27.53 kg/m  as calculated from the following:    Height as of this encounter: 1.829 m (6').    Weight as of this encounter: 92.1 kg (203 lb).              Darwin Steven PA-C  Hospitalist Service  Securely message with [employer] (more info)  Text page via [facility] Paging/Directory   ______________________________________________________________________    Chief Complaint   Acute alcohol detox    History is obtained from the patient    History of Present Illness   Anatoliy Boykin is a 55 year old male admitted on 7/30/2025.  Medical history notable for alcohol use disorder, anxiety, depression.  Currently admitted for acute alcohol withdrawal, medicine consulted for medical comanagement.  Saw patient in room.  Reports problematic drinking for approximately the last 10 years.  Recently was sober in April for approximately 3 weeks after revealing two his sisters current hiding of alcohol use.  No history of withdrawal seizures, hallucinations.  Confirms recent assessment by [facility] hepatology.  No other significant concerns at this time.     Past Medical History    Past Medical History:   Diagnosis Date    Alcoholic cirrhosis of liver without ascites (H)     Elevated liver enzymes     LOWELL (generalized anxiety disorder)     MDD (major depressive disorder)     social anxiety disorder 1998       Past Surgical History   No past surgical history on file.    Medications   Current Facility-Administered Medications   Medication Dose Route Frequency Provider Last Rate Last Admin    acetaminophen (TYLENOL) tablet 650 mg  650 mg Oral Q6H PRN Ele Conway MD   650 mg at 07/31/25 0844    alum & mag hydroxide-simethicone (MAALOX) suspension 30 mL  30 mL Oral Q4H PRN Ele Conway MD        escitalopram (LEXAPRO) tablet 20 mg  20 mg Oral Daily Ele Conway MD   20 mg at 07/31/25  0842    folic acid (FOLVITE) tablet 1 mg  1 mg Oral Daily Ele Conway MD   1 mg at 07/31/25 0812    gabapentin (NEURONTIN) capsule 300 mg  300 mg Oral TID Ele Conway MD   300 mg at 07/31/25 1415    hydrOXYzine HCl (ATARAX) tablet 25 mg  25 mg Oral Q4H PRN Ele Conway MD        loperamide (IMODIUM) capsule 2 mg  2 mg Oral 4x Daily PRN Ele Conway MD        LORazepam (ATIVAN) tablet 1-2 mg  1-2 mg Oral Q30 Min PRN Ele Conway MD   1 mg at 07/31/25 0843    melatonin tablet 3 mg  3 mg Oral At Bedtime PRN Ele Conway MD        multivitamin, therapeutic (THERA-VIT) tablet 1 tablet  1 tablet Oral Daily Ele Conway MD   1 tablet at 07/31/25 0812    ondansetron (ZOFRAN ODT) ODT tab 4 mg  4 mg Oral Q6H PRN Ele Conway MD        polyethylene glycol (MIRALAX) Packet 17 g  17 g Oral Daily PRN Ele Conway MD        thiamine (B-1) tablet 100 mg  100 mg Oral Daily Ele Conway MD   100 mg at 07/31/25 0812          Physical Exam   Vital Signs: Temp: 98.5  F (36.9  C) Temp src: Oral BP: (!) 144/71 Pulse: 102   Resp: 16 SpO2: 97 % O2 Device: None (Room air)    Weight: 203 lbs 0 oz    Exam:  General: Appears stated age, no acute distress  Head: Normocephalic.   Cardiovascular: S1/S2, Normal rate and rhythm   Respiratory: Normal respiratory effort, lung fields clear to auscultation  Gastrointestinal: Bowel sounds normal, no tenderness to palpation   Neuropsych: Speaks clearly, answers questions appropriately,     Medical Decision Making       45 MINUTES SPENT BY ME on the date of service doing chart review, history, exam, documentation & further activities per the note.      Data   ------------------------- PAST 24 HR DATA REVIEWED -----------------------------------------------

## 2025-07-31 NOTE — H&P
Psychiatry History and Physical    Anatoliy Boykin MRN# [id number]   Age: 55 year old YOB: 1970     Date of Admission:  7/30/2025          Assessment:   This patient is a 55 year old male with history of alcohol use disorder, anxiety, depression who presented to ED seeking detox from alcohol. Medically cleared in ED, admitted to 3A as a voluntary patient. Drinking 1/2L of vodka daily, EtOH breath test 0.221(H), UDS negative. CIWA changed to lorazepam (due to cirrhosis) for alcohol withdrawal. Withdrawal precautions in place, denies history of seizures. Admission labs ordered and reviewed those resulted. IM consult placed. Denying safety concerns including SI and HI. PTA medications continued as appropriate. Pt reports goals for hospitalization are to discharge home, plans to discuss residential vs outpatient treatment with family before committing.      Inpatient psychiatric hospitalization is warranted at this time for safety, stabilization, and possible adjustment in medications.         Diagnoses:     Alcohol use disorder, severe, dependence with withdrawal with complication including cirrhosis   MDD, recurrent, moderate   Reports history of social anxiety disorder  Hypochloremia  High anion gap metabolic acidosis  Elevated LFTs including hyperbilirubinemia   Suspected alcoholic hepatitis   Alcoholic cirrhosis  Anemia  Decreased oral intake, at risk of refeeding syndrome      Clinically Significant Risk Factors Present on Admission          # Hypochloremia: Lowest Cl = 97 mmol/L in last 2 days, will monitor as appropriate                    # Overweight: Estimated body mass index is 27.53 kg/m  as calculated from the following:    Height as of this encounter: 1.829 m (6').    Weight as of this encounter: 92.1 kg (203 lb).                        Plan:   Psychiatric treatment/inteventions:  Medications:   -CIWA with lorazepam, thiamine, folic acid, multivitamin for alcohol withdrawal   -continue  PTA escitalopram 20mg daily for mood  -continue PTA gabapentin 300mg TID for mood, substance use   -PRN hydroxyzine 25mg every 4 hours for anxiety   -PRN trazodone 50mg at bedtime for sleep   -consider MAT for AUD prior to discharge - interested in naltrexone, placed order for pt to review info on naltrexone, antabuse and acamprosate   -patient interested in possible residential vs outpatient, plans to discharge home after detox however       Laboratory/Imaging: reviewed- EtOH breath test 0.221(H), UDS negative; CMP with Cl 97(L), BUN 5.1(L), anion gap 18(H), (H), total bilirubin 1.7(H) otherwise WNL: Lipase WNL; CBC with Hgb 12.6(L), hematocrit 34.8(L), RBC count 3.28(L), (H), MCH 38.4(H), RDW unable to be calculated otherwise WNL; GGT, TSH, Lipid panel and Hgba1c ordered to complete admission labs     Patient will be treated in therapeutic milieu with appropriate individual and group therapies as described.    Medical treatment/interventions:  Medical concerns:   1) Alcohol withdrawal  -CIWA as above  -PRN zofran and imodium for GI distress related to withdrawal   -withdrawal precautions    2) IM consult placed for management of other medical concerns, per consult note on 7/31/25:   Anatoliy Boykin is a 55 year old male admitted on 7/30/2025.  Medical history notable for alcohol use disorder, anxiety, depression.  Currently admitted for acute alcohol withdrawal, medicine consulted for medical comanagement.      #Acute alcohol withdrawal  #Alcohol use disorder  Reports problematic drinking for approximately 10 years.  No previous detox.  Did note to have brief period of 3 weeks of sobriety in April after revealing to family that he has had ongoing drinking problem.  Drinks approximately 500 mL of vodka throughout the day.  Denies any previous history of withdrawal seizures, hallucinations.  Last drink 7/30 at approximately 12 PM.  Reporting relatively minimal withdrawal symptoms at this  time  -Withdrawal management as per psychiatry     #Liver cirrhosis  #Suspected alcoholic hepatitis  #Elevated AST  #Hyperbilirubinemia  Follows with [facility] hepatology. Labs upon admission noted to have elevated AST at 130.  ALT 63.  Alk phos within normal limits at 139.  Bilirubin elevated at 1.7.  GGT elevated at 327.  Suspect current elevation secondary to current alcohol consumption and chronic cirrhosis.   -Will obtain INR to calculate current meld   - Recheck CMP on 8/2, and can sign off if stable  - Follow-up with PCP/hepatology for further management of hepatitis/cirrhosis     # Moderate recurrent depression  #Social anxiety  - PTA Lexapro  - Management as per psychiatry     #Decreased oral intake  #At risk for refeeding syndrome  Patient reports overall limited oral intake with current drinking.   - Will check potassium, magnesium, phosphorus     The patient's care was discussed with the patient      Darwin Steven PA-C  Hospitalist Service      Legal Status: Voluntary    Safety Assessment:   Behavioral Orders   Procedures    Code 1 - Restrict to Unit    Routine Programming     As clinically indicated    Standard Monitoring: 15-Minute Checks     Every 15 minutes.    Withdrawal precautions      Pt has not required locked seclusion or restraints in the past 24 hours to maintain safety, please refer to RN documentation for further details.    The risks, benefits, alternatives and side effects have been discussed and are understood by the patient.    Disposition: Pending clinical stabilization. Will likely discharge to home when stable.    Robb Ann MD       Attestation: This patient has been seen and evaluated by me, Ele Conway DO on the date of this note. I have discussed this patient with the fellow and I agree with the findings and plan in this note. I have reviewed today's vital signs, medications, labs and imaging. >75 min total time that was spent in counseling and coordination of care with  "staff, reviewing medical record, educating patient about treatment options, side effects and benefits and alternative treatments for medications, providing supportive therapy and redirection regarding above symptoms.    Ele Conway DO           Chief Complaint:     \"Alcohol withdrawal\"         History of Present Illness:     Anatoliy Boykin is a 55 year old male with history of alcohol use disorder, anxiety, depression who presented to ED seeking detox from alcohol.    Chart review completed including ED notes for this visit; as well as mental health/addiction assessment note on 7/29/25; primary care clinic note referencing history of alcohol use disorder, cirrhosis, MDD, social anxiety - continuation of gabapentin for AUD, FMLA forms to facilitate CD treatment; hepatology clinic note 5/29/25 noting suspected alcohol-induced liver disease.    Per ED physician note: Anatoliy Boykin is a 55 year old male with history of alcohol dependence, alcoholic liver cirrhosis who presents to the emergency department seeking alcohol detox. He reports he has struggled with alcohol almost daily over the last 10 years, had about 1 month sobriety in April of this year. Recently, he has been drinking about half a liter of vodka daily with last drink around noon. He has never been to detox before. No history of alcohol withdrawal seizures or DTs. No suicidal or homicidal ideation, no auditory or visual hallucinations. He reports he is feeling anxious and a little agitated at this time, otherwise doing well. No fever, cough, congestion, runny nose, sore throat, chest pain, shortness of breath, abdominal pain, nausea, vomiting, diarrhea, urinary symptoms, or any other concerns at this time.     Upon interview: Confirms above. Reports alcohol use first became problematic around 10 years ago, though has been particularly heavy for around 6-12 months. Triggers for use include stress. Recently drinking 1/2L of vodka daily. He " notes several consequences over the last year, including diagnosis of cirrhosis, relationship issues, decreased energy and interests, worsening work performance. He denies legal consequences. He has also had the urge to drink while working. He has historically hid his alcohol use, though told his wife this May and is motivated for abstinence. He has never been in CD treatment, though had a formal assessment last week and plans to engage in either residential or outpatient treatment after discharge. He needs to make arrangements with family and work first. He has been on gabapentin for alcohol use disorder prescribed by his PCP, has not noticed any changes in cravings. No history of withdrawal or seizures, though notes some shakiness and nausea when stopping use this past April. He was abstinent for 3 weeks, which is the longest he's been off of alcohol for 10 years. Denies any other substance use. In regards to mental health, reports a history of MDD and social anxiety, recently taking escitalopram consistently for many months, feels this has been helpful. Goals are abstinence, and likely residential or IOP at some point within the next several weeks.              Psychiatric Review of Systems:   Depression:   Reports: decreased interest, changes in sleep, decreased energy  Denies: depressed mood, suicidal ideation, changes in appetite, guilt, hopelessness, helplessness, impaired concentration, irritability.  Adore:   Reports: none  Denies: sleeplessness, impulsiveness, racing thoughts, increased goal-directed activities, pressured speech, increase in energy  Psychosis:   Reports: none  Denies: visual hallucinations, auditory hallucinations, paranoia, grandiosity, ideas of reference, thought insertions, thought broadcasting.  Anxiety:   Reports: anxiety  Denies: excessive worries that are difficult to control, panic attacks  PTSD:   Denies: re-experiencing past trauma, nightmares, increased arousal, avoidance of  traumatic stimuli, impaired function.  OCD:   Denies: obsessions, checking, symmetry, cleaning, skin picking.  ED:   Denies: restriction, binging, purging, excessive exercise, laxative abuse           Medical Review of Systems:     10 point review of systems is otherwise negative unless noted above.            Psychiatric History:   Psychiatric Hospitalizations: none  History of Psychosis: none  Prior ECT: none  Court Commitment: none  Suicide Attempts: none  Self-injurious Behavior: none  Violence toward others: none  Use of Psychotropics: escitalopram, gabapentin         Substance Use History:   Alcohol: See HPI  Cannabis: none  Nicotine: none  Cocaine: none  Methamphetamine: none  Opiates/Heroin: none  Benzodiazepines: none  Hallucinogens: none  Inhalants: none    Prior Chemical Dependency treatment: none          Social History:   Upbringing: Born and raised in [place]  Educational History: BA  Relationships:  Children: None  Current Living Situation:House with wife  Occupational History: Full time, at [employer] in the [place] [employer]  Financial Support: Work  Legal History:None  Abuse/Trauma History:Denies         Family History:     H/o completed suicides in family: denies  Mental Health: two sisters with anxiety  CD: denies    Family History   Problem Relation Age of Onset    Hypertension Mother     EYE* Mother         Cataracts     Hypertension Father     EYE* Father         Cataracts     Arrhythmia Father     Cancer Maternal Grandmother     Colon Cancer Maternal Grandmother          at 66    Substance Abuse Maternal Grandfather     Myocardial Infarction Maternal Grandfather          at 83    Arrhythmia Maternal Grandfather     Hypertension Maternal Grandfather     Myocardial Infarction Paternal Grandmother          72    Substance Abuse Paternal Grandfather     Kidney Disease Paternal Grandfather     Hypertension Maternal Uncle     Diabetes Maternal Uncle     Cancer Paternal Aunt     Cancer Paternal Uncle                 Past Medical History:     Past Medical History:   Diagnosis Date    Alcoholic cirrhosis of liver without ascites (H)     Elevated liver enzymes     LOWELL (generalized anxiety disorder)     MDD (major depressive disorder)     social anxiety disorder 1998       History of seizures: Denies         Past Surgical History:     No past surgical history on file.           Allergies:      Allergies   Allergen Reactions    Pcn [Penicillins]      As child, pt does not know reaction              Medications:   I have reviewed this patient's current medications    Facility-Administered Medications Prior to Admission   Medication Dose Route Frequency Provider Last Rate Last Admin    sodium chloride (PF) 0.9% PF flush 3 mL  3 mL Intravenous q1 min prn          Medications Prior to Admission   Medication Sig Dispense Refill Last Dose/Taking    escitalopram (LEXAPRO) 20 MG tablet Take 1 tablet (20 mg) by mouth daily. 90 tablet 0 7/30/2025    gabapentin (NEURONTIN) 300 MG capsule Take 1 capsule (300 mg) by mouth 3 times daily. 270 capsule 0 7/30/2025    escitalopram (LEXAPRO) 10 MG tablet Take 1 tablet (10 mg) by mouth daily. 90 tablet 3 Unknown    gabapentin (NEURONTIN) 100 MG capsule Take 1 capsule (100 mg) by mouth 3 times daily. 270 capsule 0 Unknown    omeprazole (PRILOSEC) 20 MG DR capsule Take 1 capsule (20 mg) by mouth 2 times daily. 60 capsule 0 Unknown    ondansetron (ZOFRAN ODT) 4 MG ODT tab Take 1 tablet (4 mg) by mouth every 8 hours as needed for nausea. 12 tablet 0 Unknown    rosuvastatin (CRESTOR) 5 MG tablet Take 1 tablet (5 mg) by mouth daily. 90 tablet 1 Unknown             Labs:     Recent Results (from the past 24 hours)   Comprehensive Metabolic Panel (Limited Occurrences)    Collection Time: 07/30/25  4:00 PM   Result Value Ref Range    Sodium 138 135 - 145 mmol/L    Potassium 3.9 3.4 - 5.3 mmol/L    Carbon Dioxide (CO2) 23 22 - 29 mmol/L    Anion Gap 18 (H) 7 - 15 mmol/L    Urea Nitrogen 5.1 (L) 6.0  - 20.0 mg/dL    Creatinine 0.68 0.67 - 1.17 mg/dL    GFR Estimate >90 >60 mL/min/1.73m2    Calcium 9.2 8.8 - 10.4 mg/dL    Chloride 97 (L) 98 - 107 mmol/L    Glucose 92 70 - 99 mg/dL    Alkaline Phosphatase 139 40 - 150 U/L     (H) 0 - 45 U/L    ALT 63 0 - 70 U/L    Protein Total 6.6 6.4 - 8.3 g/dL    Albumin 4.4 3.5 - 5.2 g/dL    Bilirubin Total 1.7 (H) <=1.2 mg/dL   Magnesium (Limited Occurrences)    Collection Time: 07/30/25  4:00 PM   Result Value Ref Range    Magnesium 2.0 1.7 - 2.3 mg/dL   CBC with platelets and differential    Collection Time: 07/30/25  4:00 PM   Result Value Ref Range    WBC Count 10.7 4.0 - 11.0 10e3/uL    RBC Count 3.28 (L) 4.40 - 5.90 10e6/uL    Hemoglobin 12.6 (L) 13.3 - 17.7 g/dL    Hematocrit 34.8 (L) 40.0 - 53.0 %     (H) 78 - 100 fL    MCH 38.4 (H) 26.5 - 33.0 pg    MCHC 36.2 31.5 - 36.5 g/dL    RDW      Platelet Count 207 150 - 450 10e3/uL    % Neutrophils 69 %    % Lymphocytes 22 %    % Monocytes 7 %    % Eosinophils 1 %    % Basophils 1 %    % Immature Granulocytes 1 %    NRBCs per 100 WBC 0 <1 /100    Absolute Neutrophils 7.4 1.6 - 8.3 10e3/uL    Absolute Lymphocytes 2.3 0.8 - 5.3 10e3/uL    Absolute Monocytes 0.7 0.0 - 1.3 10e3/uL    Absolute Eosinophils 0.1 0.0 - 0.7 10e3/uL    Absolute Basophils 0.1 0.0 - 0.2 10e3/uL    Absolute Immature Granulocytes 0.1 <=0.4 10e3/uL    Absolute NRBCs 0.0 10e3/uL   RBC and Platelet Morphology    Collection Time: 07/30/25  4:00 PM   Result Value Ref Range    RBC Morphology Confirmed RBC Indices     Platelet Assessment  Automated Count Confirmed. Platelet morphology is normal.     Automated Count Confirmed. Platelet morphology is normal.   Lipase    Collection Time: 07/30/25  4:00 PM   Result Value Ref Range    Lipase 33 13 - 60 U/L   Alcohol breath test POCT    Collection Time: 07/30/25  4:04 PM   Result Value Ref Range    Alcohol Breath Test 0.221 (A) 0.00 - 0.01   Urine Drug Screen Panel    Collection Time: 07/30/25  4:51 PM  "  Result Value Ref Range    Amphetamines Urine Screen Negative Screen Negative    Barbituates Urine Screen Negative Screen Negative    Benzodiazepine Urine Screen Negative Screen Negative    Cannabinoids Urine Screen Negative Screen Negative    Cocaine Urine Screen Negative Screen Negative    Fentanyl Qual Urine Screen Negative Screen Negative    Opiates Urine Screen Negative Screen Negative    PCP Urine Screen Negative Screen Negative       BP (!) 144/71 (BP Location: Left arm, Patient Position: Sitting, Cuff Size: Adult Regular)   Pulse 102   Temp 98.5  F (36.9  C) (Oral)   Resp 16   Ht 1.829 m (6')   Wt 92.1 kg (203 lb)   SpO2 97%   BMI 27.53 kg/m    Weight is 203 lbs 0 oz  Body mass index is 27.53 kg/m .           Psychiatric Mental Status Examination:   Appearance: awake, alert, adequately groomed, appears recorded age  Attitude: cooperative and pleasant  Eye Contact: good  Mood:  \"pretty good\"  Affect: mood congruent and full range  Speech:  clear, coherent and normal prosody  Language: fluent in English  Psychomotor Behavior:  no evidence of tardive dyskinesia, dystonia, or tics  Gait/Station: normal  Thought Process:  linear, logical, goal oriented  Associations:  no loose associations  Thought Content:  Denying SI/HI/AVH; no evidence of psychotic thinking  Insight:  fair  Judgement: intact  Oriented to:  time, person, and place  Attention Span and Concentration:  intact  Recent and Remote Memory:  intact  Fund of Knowledge: appropriate           Physical Exam:   Please refer to physical exam completed by ED provider, Patricia Rios MD, on 7/31/25 as below. I agree with the findings and assessment and have no additional findings to add at this time with exception that psychiatric findings now above in MSE.   Physical Exam  Vitals and nursing note reviewed.   Constitutional:       General: He is not in acute distress.     Appearance: Normal appearance. He is not ill-appearing, toxic-appearing or " diaphoretic.      Comments: Awake, sitting on edge of bed, pleasant, answering questions appropriately   HENT:      Head: Atraumatic.      Mouth/Throat:      Mouth: Mucous membranes are moist.      Pharynx: Oropharynx is clear.   Eyes:      General: Scleral icterus (mild) present.      Conjunctiva/sclera: Conjunctivae normal.   Cardiovascular:      Rate and Rhythm: Normal rate and regular rhythm.      Heart sounds: Normal heart sounds.   Pulmonary:      Effort: Pulmonary effort is normal. No respiratory distress.      Breath sounds: Normal breath sounds. No stridor. No wheezing, rhonchi or rales.   Abdominal:      General: Abdomen is flat.      Palpations: Abdomen is soft.      Tenderness: There is no abdominal tenderness. There is no guarding or rebound.   Musculoskeletal:         General: No deformity.      Cervical back: Neck supple.   Skin:     General: Skin is warm.   Neurological:      General: No focal deficit present.      Mental Status: He is alert and oriented to person, place, and time.      Comments: No tremors or tongue fasciculations   Psychiatric:         Mood and Affect: Mood normal.         Behavior: Behavior normal.         Thought Content: Thought content normal.         Judgment: Judgment normal.

## 2025-07-31 NOTE — PROGRESS NOTES
07/30/25 1940   Patient Belongings   Did you bring any home meds/supplements to the hospital?  No   Patient Belongings other (see comments);remains on inpatient care area  (odd side storage)   Patient Belongings Put in Hospital Secure Location (Security or Locker, etc.) other (see comments)   Belongings Search Yes   Clothing Search Yes   Second Staff Derek ALDRICH     Med room bin: cell phone    Security: [employer] #[id number]: [employer] card (8759), [place] 's license, [place] Mastercard (2063), [facility] card, [employer] card, 2 [employer] cards, metal billfold, [employer] Staff ID, loose change, [employer] earbuds     Storage bin: grey shoes (strings), , black backpack (loose papers, , mouthwash, toothbrush, deodorant, tissues, grey shorts (strings), black shorts (strings)    With patient: blue jeans, blue long sleeve top, brown shirt, blue shirt, light grey shirt, 3 black underwear, gold colored band ring, book, glasses with case     *Patient declined clothes being washed. Patient okayed staff to throw broken mints away.       A               Admission:  I am responsible for any personal items that are not sent to the safe or pharmacy.  [facility] is not responsible for loss, theft or damage of any property in my possession.    Signature:  _________________________________ Date: _______  Time: _____                                              Staff Signature:  ____________________________ Date: ________  Time: _____      2nd Staff person, if patient is unable/unwilling to sign:    Signature: ________________________________ Date: ________  Time: _____     Discharge:  [facility] has returned all of my personal belongings:    Signature: _________________________________ Date: ________  Time: _____                                          Staff Signature:  ____________________________ Date: ________  Time: _____

## 2025-07-31 NOTE — PLAN OF CARE
Problem: Adult Behavioral Health Plan of Care  Goal: Plan of Care Review  Outcome: Progressing  Flowsheets (Taken 7/31/2025 1600)  Patient Agreement with Plan of Care: agrees   Goal Outcome Evaluation:    Plan of Care Reviewed With: patient    Pt isolative to self, Vitals WNL after a recheck, unsure of after care following discharge, minimal withdrawal symptoms.    CIWA 1. Pt denies pain.

## 2025-08-01 VITALS
HEIGHT: 72 IN | SYSTOLIC BLOOD PRESSURE: 130 MMHG | RESPIRATION RATE: 16 BRPM | BODY MASS INDEX: 27.5 KG/M2 | WEIGHT: 203 LBS | DIASTOLIC BLOOD PRESSURE: 68 MMHG | OXYGEN SATURATION: 97 % | TEMPERATURE: 98.2 F | HEART RATE: 86 BPM

## 2025-08-01 LAB
ANION GAP SERPL CALCULATED.3IONS-SCNC: 10 MMOL/L (ref 7–15)
BUN SERPL-MCNC: 5.8 MG/DL (ref 6–20)
CALCIUM SERPL-MCNC: 8.5 MG/DL (ref 8.8–10.4)
CHLORIDE SERPL-SCNC: 99 MMOL/L (ref 98–107)
CREAT SERPL-MCNC: 0.71 MG/DL (ref 0.67–1.17)
EGFRCR SERPLBLD CKD-EPI 2021: >90 ML/MIN/1.73M2
GLUCOSE SERPL-MCNC: 101 MG/DL (ref 70–99)
HCO3 SERPL-SCNC: 27 MMOL/L (ref 22–29)
HOLD SPECIMEN: NORMAL
MAGNESIUM SERPL-MCNC: 1.7 MG/DL (ref 1.7–2.3)
PHOSPHATE SERPL-MCNC: 2.7 MG/DL (ref 2.5–4.5)
POTASSIUM SERPL-SCNC: 3.9 MMOL/L (ref 3.4–5.3)
SODIUM SERPL-SCNC: 136 MMOL/L (ref 135–145)

## 2025-08-01 PROCEDURE — 83735 ASSAY OF MAGNESIUM: CPT

## 2025-08-01 PROCEDURE — 84100 ASSAY OF PHOSPHORUS: CPT

## 2025-08-01 PROCEDURE — 36415 COLL VENOUS BLD VENIPUNCTURE: CPT

## 2025-08-01 PROCEDURE — 250N000013 HC RX MED GY IP 250 OP 250 PS 637: Performed by: PSYCHIATRY & NEUROLOGY

## 2025-08-01 PROCEDURE — 80048 BASIC METABOLIC PNL TOTAL CA: CPT

## 2025-08-01 PROCEDURE — 99239 HOSP IP/OBS DSCHRG MGMT >30: CPT | Performed by: NURSE PRACTITIONER

## 2025-08-01 RX ADMIN — THERA TABS 1 TABLET: TAB at 08:14

## 2025-08-01 RX ADMIN — ESCITALOPRAM OXALATE 20 MG: 20 TABLET ORAL at 08:14

## 2025-08-01 RX ADMIN — THIAMINE HCL TAB 100 MG 100 MG: 100 TAB at 08:14

## 2025-08-01 RX ADMIN — GABAPENTIN 300 MG: 300 CAPSULE ORAL at 08:14

## 2025-08-01 RX ADMIN — FOLIC ACID 1 MG: 1 TABLET ORAL at 08:14

## 2025-08-01 ASSESSMENT — ACTIVITIES OF DAILY LIVING (ADL)
ADLS_ACUITY_SCORE: 15

## 2025-08-01 NOTE — DISCHARGE SUMMARY
PSYCHIATRY  DISCHARGE SUMMARY     DATE OF DISCHARGE   8/1/2025       DISCHARGE DIAGNOSIS   Alcohol use disorder, severe, dependence with withdrawal with complication including cirrhosis   MDD, recurrent, moderate   Reports history of social anxiety disorder  Hypochloremia  High anion gap metabolic acidosis  Elevated LFTs including hyperbilirubinemia   Suspected alcoholic hepatitis   Alcoholic cirrhosis  Anemia  Decreased oral intake, at risk of refeeding syndrome    Patient Active Problem List   Diagnosis    LOWELL (generalized anxiety disorder)    CARDIOVASCULAR SCREENING; LDL GOAL LESS THAN 160    Performance anxiety    Alcohol use disorder in remission    Elevated blood pressure reading without diagnosis of hypertension    Thrombocytopenia    Elevated liver enzymes    Social anxiety disorder    Moderate recurrent major depression (H)    Alcoholic cirrhosis of liver without ascites (H)    Alcohol use disorder, severe, dependence (H)    Alcohol dependence with withdrawal with complication (H)        REASON FOR ADMISSION   Per H&P dated 7/31/25: This patient is a 55 year old male with history of alcohol use disorder, anxiety, depression who presented to ED seeking detox from alcohol. Medically cleared in ED, admitted to [facility] as a voluntary patient. Drinking 1/2L of vodka daily, EtOH breath test 0.221(H), UDS negative. CIWA changed to lorazepam (due to cirrhosis) for alcohol withdrawal. Withdrawal precautions in place, denies history of seizures. Admission labs ordered and reviewed those resulted. IM consult placed. Denying safety concerns including SI and HI. PTA medications continued as appropriate. Pt reports goals for hospitalization are to discharge home, plans to discuss residential vs outpatient treatment with family before committing. Inpatient psychiatric hospitalization is warranted at this time for safety, stabilization, and possible adjustment in medications.     HOSPITAL COURSE   Admitted due to  aforementioned presentation.  Education regarding diagnostic and treatment options with risks, benefits and alternatives and adequate verbalization of understanding.    Per psychiatry dated 7/31/25:     Psychiatric treatment/inteventions:  Medications:   -CIWA with lorazepam, thiamine, folic acid, multivitamin for alcohol withdrawal   -continue PTA escitalopram 20mg daily for mood  -continue PTA gabapentin 300mg TID for mood, substance use   -PRN hydroxyzine 25mg every 4 hours for anxiety   -PRN trazodone 50mg at bedtime for sleep   -consider MAT for AUD prior to discharge - interested in naltrexone, placed order for pt to review info on naltrexone, antabuse and acamprosate   -patient interested in possible residential vs outpatient, plans to discharge home after detox however         Laboratory/Imaging: reviewed- EtOH breath test 0.221(H), UDS negative; CMP with Cl 97(L), BUN 5.1(L), anion gap 18(H), (H), total bilirubin 1.7(H) otherwise WNL: Lipase WNL; CBC with Hgb 12.6(L), hematocrit 34.8(L), RBC count 3.28(L), (H), MCH 38.4(H), RDW unable to be calculated otherwise WNL; GGT, TSH, Lipid panel and Hgba1c ordered to complete admission labs      Patient will be treated in therapeutic milieu with appropriate individual and group therapies as described.     Medical treatment/interventions:  Medical concerns:   1) Alcohol withdrawal  -CIWA as above  -PRN zofran and imodium for GI distress related to withdrawal   -withdrawal precautions     2) IM consult placed for management of other medical concerns, per consult note on 7/31/25:   Anatoliy Boykin is a 55 year old male admitted on 7/30/2025.  Medical history notable for alcohol use disorder, anxiety, depression.  Currently admitted for acute alcohol withdrawal, medicine consulted for medical comanagement.      #Acute alcohol withdrawal  #Alcohol use disorder  Reports problematic drinking for approximately 10 years.  No previous detox.  Did note to  have brief period of 3 weeks of sobriety in April after revealing to family that he has had ongoing drinking problem.  Drinks approximately 500 mL of vodka throughout the day.  Denies any previous history of withdrawal seizures, hallucinations.  Last drink 7/30 at approximately 12 PM.  Reporting relatively minimal withdrawal symptoms at this time  -Withdrawal management as per psychiatry     #Liver cirrhosis  #Suspected alcoholic hepatitis  #Elevated AST  #Hyperbilirubinemia  Follows with [facility] hepatology. Labs upon admission noted to have elevated AST at 130.  ALT 63.  Alk phos within normal limits at 139.  Bilirubin elevated at 1.7.  GGT elevated at 327.  Suspect current elevation secondary to current alcohol consumption and chronic cirrhosis.   -Will obtain INR to calculate current meld   - Recheck CMP on 8/2, and can sign off if stable  - Follow-up with PCP/hepatology for further management of hepatitis/cirrhosis     # Moderate recurrent depression  #Social anxiety  - PTA Lexapro  - Management as per psychiatry     #Decreased oral intake  #At risk for refeeding syndrome  Patient reports overall limited oral intake with current drinking.   - Will check potassium, magnesium, phosphorus     The patient's care was discussed with the patient      Darwin Steven PA-C  Hospitalist Service        Legal Status: Voluntary     Safety Assessment:        Behavioral Orders   Procedures    Code 1 - Restrict to Unit    Routine Programming       As clinically indicated    Standard Monitoring: 15-Minute Checks       Every 15 minutes.    Withdrawal precautions      Pt has not required locked seclusion or restraints in the past 24 hours to maintain safety, please refer to RN documentation for further details.    The risks, benefits, alternatives and side effects have been discussed and are understood by the patient.     Disposition: Pending clinical stabilization. Will likely discharge to home when stable.    Patient plans to  discharge from detox unit and discussed treatment options with family.  Patient states he is invested in obtaining long-term sobriety and will at a minimum participate in outpatient CD treatment.  Patient adamantly denies suicidal/homicidal ideation/intent/plan.  Patient denies psychotic symptoms/nonobserved.  Patient denies access to firearms.  Patient mood neutral with full range affect.  Patient polite and cooperative with discharge process.       MENTAL STATUS EXAM   Vitals: /68   Pulse 86   Temp 98.2  F (36.8  C) (Oral)   Resp 16   Ht 1.829 m (6')   Wt 92.1 kg (203 lb)   SpO2 97%   BMI 27.53 kg/m      MENTAL STATUS EXAM  Appearance: appropriate  Attitude: cooperative  Behavior: Calm  Eyre Contact: normal  Speech: Normal rate rhythm  Orientation: A and O x 4  Mood:: Neutral  Affect: Mood congruent  Thought Process: clear  Suicidal Ideation: Denies  Hallucination: Denies       DISCHARGE MEDICATIONS   Scheduled Meds:  Current Facility-Administered Medications   Medication Dose Route Frequency Provider Last Rate Last Admin    escitalopram (LEXAPRO) tablet 20 mg  20 mg Oral Daily Ele Conway MD   20 mg at 08/01/25 0814    folic acid (FOLVITE) tablet 1 mg  1 mg Oral Daily Ele Conway MD   1 mg at 08/01/25 0814    gabapentin (NEURONTIN) capsule 300 mg  300 mg Oral TID Ele Conway MD   300 mg at 08/01/25 0814    multivitamin, therapeutic (THERA-VIT) tablet 1 tablet  1 tablet Oral Daily Ele Conway MD   1 tablet at 08/01/25 0814    thiamine (B-1) tablet 100 mg  100 mg Oral Daily Ele Conway MD   100 mg at 08/01/25 0814     Continuous Infusions:  Current Facility-Administered Medications   Medication Dose Route Frequency Provider Last Rate Last Admin     PRN Meds:.  Current Facility-Administered Medications   Medication Dose Route Frequency Provider Last Rate Last Admin    acetaminophen (TYLENOL) tablet 650 mg  650 mg Oral Q6H PRN Ele Conway MD   650 mg at  07/31/25 0844    alum & mag hydroxide-simethicone (MAALOX) suspension 30 mL  30 mL Oral Q4H PRN Ele Conway MD        cetirizine (zyrTEC) tablet 10 mg  10 mg Oral Daily PRN Ele Conway MD        hydrOXYzine HCl (ATARAX) tablet 25 mg  25 mg Oral Q4H PRN Ele Conway MD        loperamide (IMODIUM) capsule 2 mg  2 mg Oral 4x Daily PRN Ele oCnway MD        LORazepam (ATIVAN) tablet 1-2 mg  1-2 mg Oral Q30 Min PRN Ele Conway MD   1 mg at 07/31/25 0843    melatonin tablet 3 mg  3 mg Oral At Bedtime PRN Ele Conway MD        ondansetron (ZOFRAN ODT) ODT tab 4 mg  4 mg Oral Q6H PRN Ele Conway MD        polyethylene glycol (MIRALAX) Packet 17 g  17 g Oral Daily PRN Ele Conway MD           Medication side effects: Denies         DISCHARGE PLAN   1.  Education given regarding diagnostic and treatment options with risks, benefits and alternatives with adequate verbalization of understanding.  2.  Discharge to home.  Upon detailed review of risk factors, patient amenable for release.   3.  Continue aforementioned medications and associated medication changes with follow-up by outpatient mental health provider.  4.  Crisis management planning in place.    5.  Continue efforts for sobriety.  Patient plans to discuss CD options with family.  6.  Patient clinically out of detox.     Nursing and  to review further discharge recommendations.     TOTAL TIME: 35 minutes for discharge planning.        JAIMIE Bhakta CNP on 8/1/2025 at 1:14 PM

## 2025-08-01 NOTE — PLAN OF CARE
Problem: Alcohol Withdrawal  Goal: Alcohol Withdrawal Symptom Control  Outcome: Progressing   Goal Outcome Evaluation:    The author began attending to the patient at 7 p.m. Since the author started providing care, the patient had been spending more time in his room. However, he [facility] to have his vital signs checked, receive an assessment, and take his medication before going to bed. His mood was calm, and he reported no concerns. His CIWA score was 2.

## 2025-08-01 NOTE — PLAN OF CARE
Goal Outcome Evaluation:    Plan of Care Reviewed With: patient      Pt has not received any withdrawal meds in the last 24 hours and is not scoring to receive, CIWA score right now is 1, he is therefore considered OOD, pt  denies pain or discomfort, would like to discharge today to go home and discuss treatment options with his wife.    He is thomas stable, and will be getting picked up by his wife , meds to be sent to [facility]..  Pt isolative to self, does not engage with others, stays in his room, reading, takes his food trays and eats in his room.  Info pamphlets given on Antabuse, Campral and Naltrexone.     discharge info  reviewed with patient. Belongings returned, pt escorted off the unit by [place], encouraged to attend his PCP video visit on Aug 4th with Dr Zuniga at 4.35pm

## 2025-08-01 NOTE — PLAN OF CARE
Problem: Alcohol Withdrawal  Goal: Alcohol Withdrawal Symptom Control  7/31/2025 2029 by Magalis Lanza, RN  Outcome: Progressing     Problem: Sleep Disturbance  Goal: Adequate Sleep/Rest  Outcome: Progressing   Goal Outcome Evaluation:    The Patient's CIWA scores were 1 and 2 . He did not receive any medication during the night and last had medication(Ativan) for withdrawal at 0843 on July 31. The Team conducted safety checks every 15 minutes, and there were no issues.

## 2025-08-01 NOTE — PROGRESS NOTES
[facility]     Daily Encounter: Met with team, discussed patient progress, discharge plan and any impediments to discharge.    Pt will discharge from  on this adet. Pt continues to he will discuss his Tx options with his wife and follow up with his  for referrals.     Insurance: [employer]         Legal Status:  Vol       SUDs Assessment Status: Completed 7/29/25 with LESLEY Coe    ROIs on file: None     Living Situation: Lives in [place], [place] with his family      Current Providers, Supports & Collateral:  PCP: Efrain Posey PA-C at [facility]     Current Plan/Referral Status: Detox only     Safety Plan Status: Completed    IP referral tracker complete: yes      LESLEY Song  [facility] - Adult Inpatient Addiction Psychiatry Unit

## 2025-08-04 ENCOUNTER — VIRTUAL VISIT (OUTPATIENT)
Dept: FAMILY MEDICINE | Facility: CLINIC | Age: 55
End: 2025-08-04
Payer: COMMERCIAL

## 2025-08-04 ENCOUNTER — PATIENT OUTREACH (OUTPATIENT)
Dept: CARE COORDINATION | Facility: CLINIC | Age: 55
End: 2025-08-04

## 2025-08-04 DIAGNOSIS — F33.1 MODERATE RECURRENT MAJOR DEPRESSION (H): ICD-10-CM

## 2025-08-04 DIAGNOSIS — F10.20 ALCOHOL USE DISORDER, SEVERE, DEPENDENCE (H): ICD-10-CM

## 2025-08-04 DIAGNOSIS — K70.30 ALCOHOLIC CIRRHOSIS OF LIVER WITHOUT ASCITES (H): ICD-10-CM

## 2025-08-04 DIAGNOSIS — F41.1 GAD (GENERALIZED ANXIETY DISORDER): ICD-10-CM

## 2025-08-04 DIAGNOSIS — Z09 HOSPITAL DISCHARGE FOLLOW-UP: Primary | ICD-10-CM

## 2025-08-04 DIAGNOSIS — F40.10 SOCIAL ANXIETY DISORDER: ICD-10-CM

## 2025-08-04 PROCEDURE — 98005 SYNCH AUDIO-VIDEO EST LOW 20: CPT | Performed by: STUDENT IN AN ORGANIZED HEALTH CARE EDUCATION/TRAINING PROGRAM

## 2025-08-04 ASSESSMENT — PATIENT HEALTH QUESTIONNAIRE - PHQ9
10. IF YOU CHECKED OFF ANY PROBLEMS, HOW DIFFICULT HAVE THESE PROBLEMS MADE IT FOR YOU TO DO YOUR WORK, TAKE CARE OF THINGS AT HOME, OR GET ALONG WITH OTHER PEOPLE: VERY DIFFICULT
SUM OF ALL RESPONSES TO PHQ QUESTIONS 1-9: 15
SUM OF ALL RESPONSES TO PHQ QUESTIONS 1-9: 15

## 2025-08-05 ENCOUNTER — TELEPHONE (OUTPATIENT)
Dept: BEHAVIORAL HEALTH | Facility: CLINIC | Age: 55
End: 2025-08-05
Payer: COMMERCIAL

## 2025-08-05 ENCOUNTER — PATIENT OUTREACH (OUTPATIENT)
Dept: CARE COORDINATION | Facility: CLINIC | Age: 55
End: 2025-08-05
Payer: COMMERCIAL

## 2025-08-06 ENCOUNTER — MYC MEDICAL ADVICE (OUTPATIENT)
Dept: FAMILY MEDICINE | Facility: CLINIC | Age: 55
End: 2025-08-06
Payer: COMMERCIAL

## 2025-08-07 ENCOUNTER — TELEPHONE (OUTPATIENT)
Dept: BEHAVIORAL HEALTH | Facility: CLINIC | Age: 55
End: 2025-08-07
Payer: COMMERCIAL

## 2025-08-08 ENCOUNTER — HOSPITAL ENCOUNTER (OUTPATIENT)
Dept: BEHAVIORAL HEALTH | Facility: CLINIC | Age: 55
Discharge: HOME | End: 2025-08-08
Attending: FAMILY MEDICINE
Payer: COMMERCIAL

## 2025-08-08 PROCEDURE — H2035 A/D TX PROGRAM, PER HOUR: HCPCS | Mod: HQ

## 2025-08-08 PROCEDURE — H2035 A/D TX PROGRAM, PER HOUR: HCPCS | Mod: HQ | Performed by: COUNSELOR

## 2025-08-08 PROCEDURE — 1002N00001 HC LODGING PLUS FACILITY CHARGE ADULT

## 2025-08-09 ENCOUNTER — HOSPITAL ENCOUNTER (OUTPATIENT)
Dept: BEHAVIORAL HEALTH | Facility: CLINIC | Age: 55
Discharge: HOME | End: 2025-08-09
Attending: FAMILY MEDICINE
Payer: COMMERCIAL

## 2025-08-09 PROCEDURE — 1002N00001 HC LODGING PLUS FACILITY CHARGE ADULT

## 2025-08-09 PROCEDURE — H2035 A/D TX PROGRAM, PER HOUR: HCPCS | Mod: HQ

## 2025-08-10 ENCOUNTER — HOSPITAL ENCOUNTER (OUTPATIENT)
Dept: BEHAVIORAL HEALTH | Facility: CLINIC | Age: 55
Discharge: HOME | End: 2025-08-10
Attending: FAMILY MEDICINE
Payer: COMMERCIAL

## 2025-08-10 PROCEDURE — 1002N00001 HC LODGING PLUS FACILITY CHARGE ADULT

## 2025-08-10 PROCEDURE — H2035 A/D TX PROGRAM, PER HOUR: HCPCS | Mod: HQ

## 2025-08-11 ENCOUNTER — HOSPITAL ENCOUNTER (OUTPATIENT)
Dept: BEHAVIORAL HEALTH | Facility: CLINIC | Age: 55
Discharge: HOME | End: 2025-08-11
Attending: FAMILY MEDICINE
Payer: COMMERCIAL

## 2025-08-11 PROCEDURE — 1002N00001 HC LODGING PLUS FACILITY CHARGE ADULT

## 2025-08-11 PROCEDURE — H2035 A/D TX PROGRAM, PER HOUR: HCPCS | Mod: HQ

## 2025-08-12 ENCOUNTER — HOSPITAL ENCOUNTER (OUTPATIENT)
Dept: BEHAVIORAL HEALTH | Facility: CLINIC | Age: 55
Discharge: HOME | End: 2025-08-12
Attending: FAMILY MEDICINE
Payer: COMMERCIAL

## 2025-08-12 ENCOUNTER — OFFICE VISIT (OUTPATIENT)
Dept: BEHAVIORAL HEALTH | Facility: CLINIC | Age: 55
End: 2025-08-12
Payer: COMMERCIAL

## 2025-08-12 VITALS — SYSTOLIC BLOOD PRESSURE: 110 MMHG | HEART RATE: 95 BPM | OXYGEN SATURATION: 97 % | DIASTOLIC BLOOD PRESSURE: 74 MMHG

## 2025-08-12 DIAGNOSIS — F32.A ANXIETY AND DEPRESSION: ICD-10-CM

## 2025-08-12 DIAGNOSIS — F41.9 ANXIETY AND DEPRESSION: ICD-10-CM

## 2025-08-12 DIAGNOSIS — F10.20 ALCOHOL USE DISORDER, SEVERE, DEPENDENCE (H): Primary | ICD-10-CM

## 2025-08-12 DIAGNOSIS — K70.30 ALCOHOLIC CIRRHOSIS OF LIVER WITHOUT ASCITES (H): ICD-10-CM

## 2025-08-12 PROCEDURE — H2035 A/D TX PROGRAM, PER HOUR: HCPCS | Mod: HQ

## 2025-08-12 PROCEDURE — 3074F SYST BP LT 130 MM HG: CPT | Performed by: FAMILY MEDICINE

## 2025-08-12 PROCEDURE — G2211 COMPLEX E/M VISIT ADD ON: HCPCS | Performed by: FAMILY MEDICINE

## 2025-08-12 PROCEDURE — 1002N00001 HC LODGING PLUS FACILITY CHARGE ADULT

## 2025-08-12 PROCEDURE — 99205 OFFICE O/P NEW HI 60 MIN: CPT | Performed by: FAMILY MEDICINE

## 2025-08-12 PROCEDURE — 3078F DIAST BP <80 MM HG: CPT | Performed by: FAMILY MEDICINE

## 2025-08-12 RX ORDER — GABAPENTIN 600 MG/1
600 TABLET ORAL 3 TIMES DAILY
Qty: 90 TABLET | Refills: 1 | Status: SHIPPED | OUTPATIENT
Start: 2025-08-12

## 2025-08-13 ENCOUNTER — HOSPITAL ENCOUNTER (OUTPATIENT)
Dept: BEHAVIORAL HEALTH | Facility: CLINIC | Age: 55
Discharge: HOME | End: 2025-08-13
Attending: FAMILY MEDICINE
Payer: COMMERCIAL

## 2025-08-13 PROCEDURE — 1002N00001 HC LODGING PLUS FACILITY CHARGE ADULT

## 2025-08-13 PROCEDURE — H2035 A/D TX PROGRAM, PER HOUR: HCPCS | Mod: HQ

## 2025-08-14 ENCOUNTER — VIRTUAL VISIT (OUTPATIENT)
Dept: BEHAVIORAL HEALTH | Facility: CLINIC | Age: 55
End: 2025-08-14
Payer: COMMERCIAL

## 2025-08-14 DIAGNOSIS — F33.1 MAJOR DEPRESSIVE DISORDER, RECURRENT EPISODE, MODERATE WITH ANXIOUS DISTRESS (H): Primary | ICD-10-CM

## 2025-08-14 DIAGNOSIS — F41.1 GENERALIZED ANXIETY DISORDER: ICD-10-CM

## 2025-08-14 ASSESSMENT — ANXIETY QUESTIONNAIRES
GAD7 TOTAL SCORE: 10
6. BECOMING EASILY ANNOYED OR IRRITABLE: NOT AT ALL
1. FEELING NERVOUS, ANXIOUS, OR ON EDGE: NEARLY EVERY DAY
GAD7 TOTAL SCORE: 10
2. NOT BEING ABLE TO STOP OR CONTROL WORRYING: MORE THAN HALF THE DAYS
7. FEELING AFRAID AS IF SOMETHING AWFUL MIGHT HAPPEN: SEVERAL DAYS
3. WORRYING TOO MUCH ABOUT DIFFERENT THINGS: MORE THAN HALF THE DAYS
8. IF YOU CHECKED OFF ANY PROBLEMS, HOW DIFFICULT HAVE THESE MADE IT FOR YOU TO DO YOUR WORK, TAKE CARE OF THINGS AT HOME, OR GET ALONG WITH OTHER PEOPLE?: VERY DIFFICULT
GAD7 TOTAL SCORE: 10
4. TROUBLE RELAXING: SEVERAL DAYS
5. BEING SO RESTLESS THAT IT IS HARD TO SIT STILL: SEVERAL DAYS
7. FEELING AFRAID AS IF SOMETHING AWFUL MIGHT HAPPEN: SEVERAL DAYS
IF YOU CHECKED OFF ANY PROBLEMS ON THIS QUESTIONNAIRE, HOW DIFFICULT HAVE THESE PROBLEMS MADE IT FOR YOU TO DO YOUR WORK, TAKE CARE OF THINGS AT HOME, OR GET ALONG WITH OTHER PEOPLE: VERY DIFFICULT

## 2025-08-14 ASSESSMENT — PATIENT HEALTH QUESTIONNAIRE - PHQ9
10. IF YOU CHECKED OFF ANY PROBLEMS, HOW DIFFICULT HAVE THESE PROBLEMS MADE IT FOR YOU TO DO YOUR WORK, TAKE CARE OF THINGS AT HOME, OR GET ALONG WITH OTHER PEOPLE: VERY DIFFICULT
SUM OF ALL RESPONSES TO PHQ QUESTIONS 1-9: 12
SUM OF ALL RESPONSES TO PHQ QUESTIONS 1-9: 12

## 2025-08-15 ENCOUNTER — HOSPITAL ENCOUNTER (OUTPATIENT)
Dept: BEHAVIORAL HEALTH | Facility: CLINIC | Age: 55
Discharge: HOME | End: 2025-08-15
Attending: FAMILY MEDICINE
Payer: COMMERCIAL

## 2025-08-15 PROCEDURE — 1002N00001 HC LODGING PLUS FACILITY CHARGE ADULT

## 2025-08-15 PROCEDURE — H2035 A/D TX PROGRAM, PER HOUR: HCPCS | Mod: HQ

## 2025-08-16 ENCOUNTER — HOSPITAL ENCOUNTER (OUTPATIENT)
Dept: BEHAVIORAL HEALTH | Facility: CLINIC | Age: 55
Discharge: HOME | End: 2025-08-16
Attending: FAMILY MEDICINE
Payer: COMMERCIAL

## 2025-08-16 PROCEDURE — 1002N00001 HC LODGING PLUS FACILITY CHARGE ADULT

## 2025-08-16 PROCEDURE — H2035 A/D TX PROGRAM, PER HOUR: HCPCS | Mod: HQ

## 2025-08-17 ENCOUNTER — HOSPITAL ENCOUNTER (OUTPATIENT)
Dept: BEHAVIORAL HEALTH | Facility: CLINIC | Age: 55
Discharge: HOME | End: 2025-08-17
Attending: FAMILY MEDICINE
Payer: COMMERCIAL

## 2025-08-17 PROCEDURE — 1002N00001 HC LODGING PLUS FACILITY CHARGE ADULT

## 2025-08-17 PROCEDURE — H2035 A/D TX PROGRAM, PER HOUR: HCPCS | Mod: HQ

## 2025-08-18 ENCOUNTER — HOSPITAL ENCOUNTER (OUTPATIENT)
Dept: BEHAVIORAL HEALTH | Facility: CLINIC | Age: 55
Discharge: HOME | End: 2025-08-18
Attending: FAMILY MEDICINE
Payer: COMMERCIAL

## 2025-08-18 PROCEDURE — H2035 A/D TX PROGRAM, PER HOUR: HCPCS | Mod: HQ

## 2025-08-18 PROCEDURE — 1002N00001 HC LODGING PLUS FACILITY CHARGE ADULT

## 2025-08-19 ENCOUNTER — OFFICE VISIT (OUTPATIENT)
Dept: BEHAVIORAL HEALTH | Facility: CLINIC | Age: 55
End: 2025-08-19
Payer: COMMERCIAL

## 2025-08-19 ENCOUNTER — LAB (OUTPATIENT)
Dept: LAB | Facility: CLINIC | Age: 55
End: 2025-08-19
Payer: COMMERCIAL

## 2025-08-19 ENCOUNTER — HOSPITAL ENCOUNTER (OUTPATIENT)
Dept: BEHAVIORAL HEALTH | Facility: CLINIC | Age: 55
Discharge: HOME | End: 2025-08-19
Attending: FAMILY MEDICINE
Payer: COMMERCIAL

## 2025-08-19 VITALS — DIASTOLIC BLOOD PRESSURE: 79 MMHG | OXYGEN SATURATION: 98 % | SYSTOLIC BLOOD PRESSURE: 115 MMHG | HEART RATE: 78 BPM

## 2025-08-19 DIAGNOSIS — F41.9 ANXIETY AND DEPRESSION: ICD-10-CM

## 2025-08-19 DIAGNOSIS — L80 VITILIGO: ICD-10-CM

## 2025-08-19 DIAGNOSIS — K70.9: ICD-10-CM

## 2025-08-19 DIAGNOSIS — F32.A ANXIETY AND DEPRESSION: ICD-10-CM

## 2025-08-19 DIAGNOSIS — F10.20 ALCOHOL USE DISORDER, SEVERE, DEPENDENCE (H): Primary | ICD-10-CM

## 2025-08-19 DIAGNOSIS — K70.30 ALCOHOLIC CIRRHOSIS OF LIVER WITHOUT ASCITES (H): ICD-10-CM

## 2025-08-19 LAB
ALBUMIN SERPL BCG-MCNC: 3.9 G/DL (ref 3.5–5.2)
ALP SERPL-CCNC: 103 U/L (ref 40–150)
ALT SERPL W P-5'-P-CCNC: 26 U/L (ref 0–70)
ANION GAP SERPL CALCULATED.3IONS-SCNC: 9 MMOL/L (ref 7–15)
AST SERPL W P-5'-P-CCNC: 34 U/L (ref 0–45)
BASOPHILS # BLD AUTO: 0.09 10E3/UL (ref 0–0.2)
BASOPHILS NFR BLD AUTO: 1.5 %
BILIRUB SERPL-MCNC: 0.6 MG/DL
BUN SERPL-MCNC: 9.9 MG/DL (ref 6–20)
CALCIUM SERPL-MCNC: 8.9 MG/DL (ref 8.8–10.4)
CHLORIDE SERPL-SCNC: 104 MMOL/L (ref 98–107)
CREAT SERPL-MCNC: 0.93 MG/DL (ref 0.67–1.17)
EGFRCR SERPLBLD CKD-EPI 2021: >90 ML/MIN/1.73M2
EOSINOPHIL # BLD AUTO: 0.07 10E3/UL (ref 0–0.7)
EOSINOPHIL NFR BLD AUTO: 1.2 %
ERYTHROCYTE [DISTWIDTH] IN BLOOD BY AUTOMATED COUNT: 16.9 % (ref 10–15)
GLUCOSE SERPL-MCNC: 85 MG/DL (ref 70–99)
HCO3 SERPL-SCNC: 26 MMOL/L (ref 22–29)
HCT VFR BLD AUTO: 37 % (ref 40–53)
HGB BLD-MCNC: 12.5 G/DL (ref 13.3–17.7)
IMM GRANULOCYTES # BLD: <0.03 10E3/UL
IMM GRANULOCYTES NFR BLD: 0.3 %
LYMPHOCYTES # BLD AUTO: 1.36 10E3/UL (ref 0.8–5.3)
LYMPHOCYTES NFR BLD AUTO: 22.4 %
MCH RBC QN AUTO: 35 PG (ref 26.5–33)
MCHC RBC AUTO-ENTMCNC: 33.8 G/DL (ref 31.5–36.5)
MCV RBC AUTO: 103.6 FL (ref 78–100)
MONOCYTES # BLD AUTO: 0.5 10E3/UL (ref 0–1.3)
MONOCYTES NFR BLD AUTO: 8.2 %
NEUTROPHILS # BLD AUTO: 4.04 10E3/UL (ref 1.6–8.3)
NEUTROPHILS NFR BLD AUTO: 66.4 %
NRBC # BLD AUTO: <0.03 10E3/UL
NRBC BLD AUTO-RTO: 0 /100
PLATELET # BLD AUTO: 180 10E3/UL (ref 150–450)
POTASSIUM SERPL-SCNC: 4.2 MMOL/L (ref 3.4–5.3)
PROT SERPL-MCNC: 5.8 G/DL (ref 6.4–8.3)
RBC # BLD AUTO: 3.57 10E6/UL (ref 4.4–5.9)
SODIUM SERPL-SCNC: 139 MMOL/L (ref 135–145)
WBC # BLD AUTO: 6.08 10E3/UL (ref 4–11)

## 2025-08-19 PROCEDURE — 3074F SYST BP LT 130 MM HG: CPT | Performed by: FAMILY MEDICINE

## 2025-08-19 PROCEDURE — 1002N00001 HC LODGING PLUS FACILITY CHARGE ADULT

## 2025-08-19 PROCEDURE — 82310 ASSAY OF CALCIUM: CPT

## 2025-08-19 PROCEDURE — 3078F DIAST BP <80 MM HG: CPT | Performed by: FAMILY MEDICINE

## 2025-08-19 PROCEDURE — 99215 OFFICE O/P EST HI 40 MIN: CPT | Performed by: FAMILY MEDICINE

## 2025-08-19 PROCEDURE — 36415 COLL VENOUS BLD VENIPUNCTURE: CPT

## 2025-08-19 PROCEDURE — H2035 A/D TX PROGRAM, PER HOUR: HCPCS | Mod: HQ

## 2025-08-19 PROCEDURE — 85025 COMPLETE CBC W/AUTO DIFF WBC: CPT

## 2025-08-19 RX ORDER — ACAMPROSATE CALCIUM 333 MG/1
666 TABLET, DELAYED RELEASE ORAL 3 TIMES DAILY
Qty: 180 TABLET | Refills: 5 | Status: SHIPPED | OUTPATIENT
Start: 2025-08-19

## 2025-08-19 ASSESSMENT — COLUMBIA-SUICIDE SEVERITY RATING SCALE - C-SSRS
ATTEMPT SINCE LAST CONTACT: NO
1. SINCE LAST CONTACT, HAVE YOU WISHED YOU WERE DEAD OR WISHED YOU COULD GO TO SLEEP AND NOT WAKE UP?: NO
2. HAVE YOU ACTUALLY HAD ANY THOUGHTS OF KILLING YOURSELF?: NO
6. HAVE YOU EVER DONE ANYTHING, STARTED TO DO ANYTHING, OR PREPARED TO DO ANYTHING TO END YOUR LIFE?: NO
TOTAL  NUMBER OF INTERRUPTED ATTEMPTS SINCE LAST CONTACT: NO
SUICIDE, SINCE LAST CONTACT: NO
TOTAL  NUMBER OF ABORTED OR SELF INTERRUPTED ATTEMPTS SINCE LAST CONTACT: NO

## 2025-08-20 ENCOUNTER — HOSPITAL ENCOUNTER (OUTPATIENT)
Dept: BEHAVIORAL HEALTH | Facility: CLINIC | Age: 55
Discharge: HOME | End: 2025-08-20
Attending: FAMILY MEDICINE
Payer: COMMERCIAL

## 2025-08-20 ENCOUNTER — PATIENT OUTREACH (OUTPATIENT)
Dept: CARE COORDINATION | Facility: CLINIC | Age: 55
End: 2025-08-20
Payer: COMMERCIAL

## 2025-08-20 PROCEDURE — H2035 A/D TX PROGRAM, PER HOUR: HCPCS | Mod: HQ

## 2025-08-20 PROCEDURE — H2035 A/D TX PROGRAM, PER HOUR: HCPCS

## 2025-08-20 PROCEDURE — 1002N00001 HC LODGING PLUS FACILITY CHARGE ADULT

## 2025-08-22 ENCOUNTER — HOSPITAL ENCOUNTER (OUTPATIENT)
Dept: BEHAVIORAL HEALTH | Facility: CLINIC | Age: 55
Discharge: HOME | End: 2025-08-22
Attending: FAMILY MEDICINE
Payer: COMMERCIAL

## 2025-08-22 PROCEDURE — 1002N00001 HC LODGING PLUS FACILITY CHARGE ADULT

## 2025-08-22 PROCEDURE — H2035 A/D TX PROGRAM, PER HOUR: HCPCS | Mod: HQ

## 2025-08-23 ENCOUNTER — HOSPITAL ENCOUNTER (OUTPATIENT)
Dept: BEHAVIORAL HEALTH | Facility: CLINIC | Age: 55
Discharge: HOME | End: 2025-08-23
Attending: FAMILY MEDICINE
Payer: COMMERCIAL

## 2025-08-23 PROCEDURE — 1002N00001 HC LODGING PLUS FACILITY CHARGE ADULT

## 2025-08-23 PROCEDURE — H2035 A/D TX PROGRAM, PER HOUR: HCPCS | Mod: HQ

## 2025-08-24 ENCOUNTER — HOSPITAL ENCOUNTER (OUTPATIENT)
Dept: BEHAVIORAL HEALTH | Facility: CLINIC | Age: 55
Discharge: HOME | End: 2025-08-24
Attending: FAMILY MEDICINE
Payer: COMMERCIAL

## 2025-08-24 PROCEDURE — 1002N00001 HC LODGING PLUS FACILITY CHARGE ADULT

## 2025-08-24 PROCEDURE — H2035 A/D TX PROGRAM, PER HOUR: HCPCS | Mod: HQ

## 2025-08-25 ENCOUNTER — HOSPITAL ENCOUNTER (OUTPATIENT)
Dept: BEHAVIORAL HEALTH | Facility: CLINIC | Age: 55
Discharge: HOME | End: 2025-08-25
Attending: FAMILY MEDICINE
Payer: COMMERCIAL

## 2025-08-25 PROCEDURE — 1002N00001 HC LODGING PLUS FACILITY CHARGE ADULT

## 2025-08-25 PROCEDURE — H2035 A/D TX PROGRAM, PER HOUR: HCPCS | Mod: HQ

## 2025-08-26 ENCOUNTER — HOSPITAL ENCOUNTER (OUTPATIENT)
Dept: BEHAVIORAL HEALTH | Facility: CLINIC | Age: 55
Discharge: HOME | End: 2025-08-26
Attending: FAMILY MEDICINE
Payer: COMMERCIAL

## 2025-08-26 PROCEDURE — H2035 A/D TX PROGRAM, PER HOUR: HCPCS | Mod: HQ

## 2025-08-26 PROCEDURE — 1002N00001 HC LODGING PLUS FACILITY CHARGE ADULT

## 2025-08-27 ENCOUNTER — HOSPITAL ENCOUNTER (OUTPATIENT)
Dept: BEHAVIORAL HEALTH | Facility: CLINIC | Age: 55
Discharge: HOME | End: 2025-08-27
Attending: FAMILY MEDICINE
Payer: COMMERCIAL

## 2025-08-27 PROCEDURE — H2035 A/D TX PROGRAM, PER HOUR: HCPCS | Mod: HQ

## 2025-08-27 PROCEDURE — 1002N00001 HC LODGING PLUS FACILITY CHARGE ADULT

## 2025-08-29 ENCOUNTER — HOSPITAL ENCOUNTER (OUTPATIENT)
Dept: BEHAVIORAL HEALTH | Facility: CLINIC | Age: 55
Discharge: HOME | End: 2025-08-29
Attending: FAMILY MEDICINE
Payer: COMMERCIAL

## 2025-08-29 PROCEDURE — H2035 A/D TX PROGRAM, PER HOUR: HCPCS | Mod: HQ

## 2025-08-29 PROCEDURE — 1002N00001 HC LODGING PLUS FACILITY CHARGE ADULT

## 2025-08-30 ENCOUNTER — HOSPITAL ENCOUNTER (OUTPATIENT)
Dept: BEHAVIORAL HEALTH | Facility: CLINIC | Age: 55
Discharge: HOME | End: 2025-08-30
Attending: FAMILY MEDICINE
Payer: COMMERCIAL

## 2025-08-30 PROCEDURE — 1002N00001 HC LODGING PLUS FACILITY CHARGE ADULT

## 2025-08-30 PROCEDURE — H2035 A/D TX PROGRAM, PER HOUR: HCPCS | Mod: HQ

## 2025-08-31 ENCOUNTER — HOSPITAL ENCOUNTER (OUTPATIENT)
Dept: BEHAVIORAL HEALTH | Facility: CLINIC | Age: 55
Discharge: HOME | End: 2025-08-31
Attending: FAMILY MEDICINE
Payer: COMMERCIAL

## 2025-08-31 PROCEDURE — 1002N00001 HC LODGING PLUS FACILITY CHARGE ADULT

## 2025-08-31 PROCEDURE — H2035 A/D TX PROGRAM, PER HOUR: HCPCS | Mod: HQ

## 2025-09-01 ENCOUNTER — HOSPITAL ENCOUNTER (OUTPATIENT)
Dept: BEHAVIORAL HEALTH | Facility: CLINIC | Age: 55
Discharge: HOME | End: 2025-09-01
Attending: FAMILY MEDICINE
Payer: COMMERCIAL

## 2025-09-01 PROCEDURE — H2035 A/D TX PROGRAM, PER HOUR: HCPCS | Mod: HQ

## 2025-09-01 PROCEDURE — 1002N00001 HC LODGING PLUS FACILITY CHARGE ADULT

## 2025-09-02 ENCOUNTER — HOSPITAL ENCOUNTER (OUTPATIENT)
Dept: BEHAVIORAL HEALTH | Facility: CLINIC | Age: 55
Discharge: HOME | End: 2025-09-02
Attending: FAMILY MEDICINE
Payer: COMMERCIAL

## 2025-09-02 PROCEDURE — 1002N00001 HC LODGING PLUS FACILITY CHARGE ADULT

## 2025-09-02 PROCEDURE — H2035 A/D TX PROGRAM, PER HOUR: HCPCS | Mod: HQ

## 2025-09-03 ENCOUNTER — HOSPITAL ENCOUNTER (OUTPATIENT)
Dept: BEHAVIORAL HEALTH | Facility: CLINIC | Age: 55
Discharge: HOME | End: 2025-09-03
Attending: FAMILY MEDICINE
Payer: COMMERCIAL

## 2025-09-03 PROCEDURE — H2035 A/D TX PROGRAM, PER HOUR: HCPCS | Mod: HQ

## 2025-09-03 PROCEDURE — 1002N00001 HC LODGING PLUS FACILITY CHARGE ADULT

## 2025-09-03 PROCEDURE — H2035 A/D TX PROGRAM, PER HOUR: HCPCS
